# Patient Record
Sex: FEMALE | Race: WHITE | NOT HISPANIC OR LATINO | Employment: OTHER | URBAN - METROPOLITAN AREA
[De-identification: names, ages, dates, MRNs, and addresses within clinical notes are randomized per-mention and may not be internally consistent; named-entity substitution may affect disease eponyms.]

---

## 2017-01-22 ENCOUNTER — HOSPITAL ENCOUNTER (EMERGENCY)
Facility: HOSPITAL | Age: 72
Discharge: HOME | End: 2017-01-22
Attending: EMERGENCY MEDICINE | Admitting: EMERGENCY MEDICINE

## 2017-01-22 ENCOUNTER — APPOINTMENT (OUTPATIENT)
Dept: GENERAL RADIOLOGY | Facility: HOSPITAL | Age: 72
End: 2017-01-22

## 2017-01-22 VITALS
TEMPERATURE: 97.9 F | WEIGHT: 147 LBS | HEART RATE: 68 BPM | OXYGEN SATURATION: 96 % | BODY MASS INDEX: 31.71 KG/M2 | HEIGHT: 57 IN | DIASTOLIC BLOOD PRESSURE: 71 MMHG | RESPIRATION RATE: 14 BRPM | SYSTOLIC BLOOD PRESSURE: 109 MMHG

## 2017-01-22 DIAGNOSIS — S93.601A RIGHT FOOT SPRAIN, INITIAL ENCOUNTER: Primary | ICD-10-CM

## 2017-01-22 PROCEDURE — 99283 EMERGENCY DEPT VISIT LOW MDM: CPT

## 2017-01-22 PROCEDURE — 73630 X-RAY EXAM OF FOOT: CPT

## 2017-01-22 RX ORDER — LOVASTATIN 20 MG/1
20 TABLET ORAL NIGHTLY
COMMUNITY

## 2017-01-22 RX ORDER — LISINOPRIL 20 MG/1
20 TABLET ORAL DAILY
Status: ON HOLD | COMMUNITY
End: 2018-04-23 | Stop reason: CLARIF

## 2017-01-22 RX ORDER — HYDROCODONE BITARTRATE AND ACETAMINOPHEN 5; 325 MG/1; MG/1
1-2 TABLET ORAL EVERY 4 HOURS PRN
Qty: 12 TABLET | Refills: 0 | Status: SHIPPED | OUTPATIENT
Start: 2017-01-22 | End: 2018-01-13 | Stop reason: HOSPADM

## 2017-01-22 RX ORDER — ATENOLOL 25 MG/1
25 TABLET ORAL DAILY
COMMUNITY

## 2017-01-22 RX ORDER — HYDROCODONE BITARTRATE AND ACETAMINOPHEN 5; 325 MG/1; MG/1
1 TABLET ORAL ONCE
Status: COMPLETED | OUTPATIENT
Start: 2017-01-22 | End: 2017-01-22

## 2017-01-22 RX ADMIN — HYDROCODONE BITARTRATE AND ACETAMINOPHEN 1 TABLET: 5; 325 TABLET ORAL at 11:03

## 2017-01-22 NOTE — ED PROVIDER NOTES
Subjective   HPI Comments: Stefanie Russell is a 72 y.o.female who presents to the ED c/o a right foot injury. Pt states she has fallen 4 times recently. The first fall she tripped at home on her slippers and hurt her right foot. The second fall she slipped on ice. The third fall she tripped entering her front door and fell into a bush. The final fall she got tangled up in her husbands walker and fell. She states her whole foot hurts now. She has an appointment with Dr. Rivero, foot orthopedist, tomorrow.    Patient is a 72 y.o. female presenting with lower extremity pain.   History provided by:  Patient  Lower Extremity Issue   Location:  Foot  Injury: yes    Mechanism of injury: fall    Fall:     Fall occurred:  Walking  Foot location:  R foot  Pain details:     Severity:  Moderate    Onset quality:  Sudden    Timing:  Constant    Progression:  Worsening  Chronicity:  New  Dislocation: no    Foreign body present:  No foreign bodies  Associated symptoms: no numbness    Risk factors: no obesity        Review of Systems   All other systems reviewed and are negative.      Past Medical History   Diagnosis Date   • Hyperlipidemia    • Hypertension        No Known Allergies    Past Surgical History   Procedure Laterality Date   • Colon surgery     • Hysterectomy     • Tonsillectomy     • Shoulder surgery       rotator cuff (left)       History reviewed. No pertinent family history.    Social History     Social History   • Marital status:      Spouse name: N/A   • Number of children: N/A   • Years of education: N/A     Social History Main Topics   • Smoking status: Never Smoker   • Smokeless tobacco: None   • Alcohol use No   • Drug use: No   • Sexual activity: Defer     Other Topics Concern   • None     Social History Narrative   • None         Objective   Physical Exam   Constitutional: She is oriented to person, place, and time. She appears well-developed and well-nourished.   HENT:   Head: Normocephalic and  "atraumatic.   Right Ear: External ear normal.   Left Ear: External ear normal.   Nose: Nose normal.   Eyes: Conjunctivae are normal.   Neck: Normal range of motion. Neck supple.   Musculoskeletal: Normal range of motion. She exhibits tenderness (Mild medial malleolus tenderness. No lateral malleolus. Calcaneuous is nml. Proximal tarsal bones are tender with no crepitance or deformity.).   Neurological: She is alert and oriented to person, place, and time.   Skin: Skin is warm and dry.   Psychiatric: She has a normal mood and affect. Her behavior is normal. Judgment and thought content normal.   Nursing note and vitals reviewed.      Procedures         ED Course  ED Course   Comment By Time   AMRIK query complete. Treatment plan to include limited course of prescribed  controlled substance. Risks including addiction, benefits, and alternatives presented to patient.    Eric Motta 01/22 1050         Course of Care      Lab Results (last 24 hours)     ** No results found for the last 24 hours. **          Note: In addition to lab results from this visit, the labs listed above may include labs taken at another facility or during a different encounter within the last 24 hours. Please correlate lab times with ED admission and discharge times for further clarification of the services performed during this visit.    XR Foot 3+ View Right    (Results Pending)       Vitals:    01/22/17 0936 01/22/17 0939   BP:  119/82   BP Location:  Right arm   Patient Position:  Sitting   Pulse: 74    Resp: 16    Temp: 97.9 °F (36.6 °C)    TempSrc: Oral    SpO2: 95%    Weight: 147 lb (66.7 kg)    Height: 57\" (144.8 cm)        Medications   HYDROcodone-acetaminophen (NORCO) 5-325 MG per tablet 1 tablet (not administered)       ECG/EMG Results (last 24 hours)     ** No results found for the last 24 hours. **                      [facility]    Final diagnoses:   Right foot sprain, initial encounter       Documentation assistance " provided by ofelia Motta.  Information recorded by the ofelia was done at my direction and has been verified and validated by me.     Eric Motta  01/22/17 1041       Eric Motta  01/22/17 1052       Eric Motta  01/22/17 1058       Henok Wagner MD  01/23/17 7148

## 2017-01-22 NOTE — DISCHARGE INSTRUCTIONS
Use your walker and wheelchair to take weight off your foot/ankle as best you can. Keep your right foot elevated as much as possible. As discussed in detail, go through your home and eliminate trip hazards.    Follow up with your orthopedist/podiatrist as already scheduled.     Information regarding Risks and Benefits When using Opioids and Other Controlled Substances to include Storage and Disposal of Medications    When considering the use of opioids and other controlled substances for the control of pain, anxiety, or for other medical purposes, you need to know of not only the benefits of these drugs but also of potential risks in using these drugs. These drugs, as well as more drugs, have beneficial uses; which is why their use is being considered in your   care, but they have risks involved in their use, too.    Opioids:  Opioids such as hydrocodone, oxycodone, hydromorphone, and codeine are pain relieving drugs, some more potent than others. They are most useful for moderate to more severe painful conditions. Risks include sedation, loss of coordination, decreased concentration, and decreased breathing with possibility of loss of consciousness or even death, especially if used in doses higher than prescribed. Improper usage can lead to addiction, tolerance, or overdose. In addition, many of these drugs are combined with acetaminophen (Tylenol) which can damage or destroy our liver when used excessively.  Alternatives to opioids are useful for mild to moderate pain and include ibuprofen (Motrin), naproxen (Aleve), aspirin, and acetaminophen (Tylenol). As with other drugs, these medications should be used according to directions on the label or from your doctor, as overuse can cause harm.    Benzodiazepines:  This group of drugs include: alprazolam (Xanax), diazepam (Valium), lorazepam (Ativan), and clonazepam (Klonopin). These drugs are used to control anxiety symptoms including anxiety and panic attacks. Risks  using these drugs include: sedation, loss of coordination, decreased ability to concentrate, effects on memory, and decreased breathing with possibility of loss of consciousness or even death. Improper and prolonged usage can lead to addiction. An alternative without addiction potential is hydroxyzine (Vistrail).    Other Controlled Substance:  This group includes Soma, Tramadol, stimulant drugs such as Ritalin, and others. Stimulant drugs are not medications that are prescribed by ER doctors. Soma and Tramadol have sedative and addictive affects similar to opioids with the same dangers mentioned with them.    Overdose:  If you or someone else are concerned that overdose has occurred, call 911 for transportation to the nearest hospital.    Storage and Disposal:  All medications need to be kept out of the reach of children or adults who cannot manage their own medicines. In addition, controlled substances can be targeted by criminals so extra precautions need to be taken to keep them in a safe, secure place. Any unused medications should be disposed of by flushing them down the toilet in the home setting or contact your local pharmacy.

## 2018-01-08 ENCOUNTER — HOSPITAL ENCOUNTER (INPATIENT)
Facility: HOSPITAL | Age: 73
LOS: 4 days | Discharge: NURSING, REHAB OR LONG-TERM CARE | End: 2018-01-13
Attending: EMERGENCY MEDICINE | Admitting: HOSPITALIST

## 2018-01-08 ENCOUNTER — APPOINTMENT (OUTPATIENT)
Dept: GENERAL RADIOLOGY | Facility: HOSPITAL | Age: 73
End: 2018-01-08

## 2018-01-08 ENCOUNTER — APPOINTMENT (OUTPATIENT)
Dept: CT IMAGING | Facility: HOSPITAL | Age: 73
End: 2018-01-08

## 2018-01-08 DIAGNOSIS — R52 INTRACTABLE PAIN: ICD-10-CM

## 2018-01-08 DIAGNOSIS — Z74.09 IMPAIRED FUNCTIONAL MOBILITY, BALANCE, GAIT, AND ENDURANCE: ICD-10-CM

## 2018-01-08 DIAGNOSIS — S32.810A MULTIPLE CLOSED FRACTURES OF PELVIS WITH STABLE DISRUPTION OF PELVIC RING, INITIAL ENCOUNTER (HCC): Primary | ICD-10-CM
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
## 2018-01-08 PROBLEM — R29.6 FREQUENT FALLS: Status: ACTIVE | Noted: 2018-01-08
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
## 2018-01-08 PROBLEM — E87.6 HYPOKALEMIA: Status: ACTIVE | Noted: 2018-01-08

## 2018-01-08 PROBLEM — M81.0 OSTEOPOROSIS: Status: ACTIVE | Noted: 2018-01-08

## 2018-01-08 PROBLEM — Z87.81 HISTORY OF PELVIC FRACTURE: Status: ACTIVE | Noted: 2018-01-08

## 2018-01-08 PROBLEM — S09.90XA HEAD TRAUMA: Status: ACTIVE | Noted: 2018-01-08

## 2018-01-08 LAB
ANION GAP SERPL CALCULATED.3IONS-SCNC: 9 MMOL/L (ref 3–11)
BASOPHILS # BLD AUTO: 0.05 10*3/MM3 (ref 0–0.2)
BASOPHILS NFR BLD AUTO: 0.5 % (ref 0–1)
BUN BLD-MCNC: 19 MG/DL (ref 9–23)
BUN/CREAT SERPL: 21.1 (ref 7–25)
CALCIUM SPEC-SCNC: 8.6 MG/DL (ref 8.7–10.4)
CHLORIDE SERPL-SCNC: 103 MMOL/L (ref 99–109)
CO2 SERPL-SCNC: 24 MMOL/L (ref 20–31)
CREAT BLD-MCNC: 0.9 MG/DL (ref 0.6–1.3)
DEPRECATED RDW RBC AUTO: 46.5 FL (ref 37–54)
EOSINOPHIL # BLD AUTO: 0.16 10*3/MM3 (ref 0–0.3)
EOSINOPHIL NFR BLD AUTO: 1.5 % (ref 0–3)
ERYTHROCYTE [DISTWIDTH] IN BLOOD BY AUTOMATED COUNT: 13.9 % (ref 11.3–14.5)
GFR SERPL CREATININE-BSD FRML MDRD: 62 ML/MIN/1.73
GLUCOSE BLD-MCNC: 113 MG/DL (ref 70–100)
HCT VFR BLD AUTO: 36.5 % (ref 34.5–44)
HGB BLD-MCNC: 12.5 G/DL (ref 11.5–15.5)
IMM GRANULOCYTES # BLD: 0.07 10*3/MM3 (ref 0–0.03)
IMM GRANULOCYTES NFR BLD: 0.7 % (ref 0–0.6)
LYMPHOCYTES # BLD AUTO: 2.09 10*3/MM3 (ref 0.6–4.8)
LYMPHOCYTES NFR BLD AUTO: 19.9 % (ref 24–44)
MCH RBC QN AUTO: 31.3 PG (ref 27–31)
MCHC RBC AUTO-ENTMCNC: 34.2 G/DL (ref 32–36)
MCV RBC AUTO: 91.5 FL (ref 80–99)
MONOCYTES # BLD AUTO: 0.84 10*3/MM3 (ref 0–1)
MONOCYTES NFR BLD AUTO: 8 % (ref 0–12)
NEUTROPHILS # BLD AUTO: 7.27 10*3/MM3 (ref 1.5–8.3)
NEUTROPHILS NFR BLD AUTO: 69.4 % (ref 41–71)
PLATELET # BLD AUTO: 227 10*3/MM3 (ref 150–450)
PMV BLD AUTO: 9.7 FL (ref 6–12)
POTASSIUM BLD-SCNC: 3.2 MMOL/L (ref 3.5–5.5)
RBC # BLD AUTO: 3.99 10*6/MM3 (ref 3.89–5.14)
SODIUM BLD-SCNC: 136 MMOL/L (ref 132–146)
WBC NRBC COR # BLD: 10.48 10*3/MM3 (ref 3.5–10.8)

## 2018-01-08 PROCEDURE — 70450 CT HEAD/BRAIN W/O DYE: CPT

## 2018-01-08 PROCEDURE — G0378 HOSPITAL OBSERVATION PER HR: HCPCS

## 2018-01-08 PROCEDURE — 80048 BASIC METABOLIC PNL TOTAL CA: CPT | Performed by: EMERGENCY MEDICINE

## 2018-01-08 PROCEDURE — 99284 EMERGENCY DEPT VISIT MOD MDM: CPT

## 2018-01-08 PROCEDURE — 85025 COMPLETE CBC W/AUTO DIFF WBC: CPT | Performed by: EMERGENCY MEDICINE

## 2018-01-08 PROCEDURE — 25010000002 ONDANSETRON PER 1 MG: Performed by: EMERGENCY MEDICINE

## 2018-01-08 PROCEDURE — 73502 X-RAY EXAM HIP UNI 2-3 VIEWS: CPT

## 2018-01-08 PROCEDURE — 93005 ELECTROCARDIOGRAM TRACING: CPT | Performed by: FAMILY MEDICINE

## 2018-01-08 PROCEDURE — 25010000002 FENTANYL CITRATE (PF) 100 MCG/2ML SOLUTION: Performed by: EMERGENCY MEDICINE

## 2018-01-08 PROCEDURE — 99223 1ST HOSP IP/OBS HIGH 75: CPT | Performed by: FAMILY MEDICINE

## 2018-01-08 PROCEDURE — 25010000002 HYDROMORPHONE PER 4 MG: Performed by: EMERGENCY MEDICINE

## 2018-01-08 PROCEDURE — 72192 CT PELVIS W/O DYE: CPT

## 2018-01-08 RX ORDER — POTASSIUM CHLORIDE 7.45 MG/ML
10 INJECTION INTRAVENOUS
Status: DISCONTINUED | OUTPATIENT
Start: 2018-01-08 | End: 2018-01-13 | Stop reason: HOSPADM

## 2018-01-08 RX ORDER — SODIUM CHLORIDE 0.9 % (FLUSH) 0.9 %
10 SYRINGE (ML) INJECTION AS NEEDED
Status: DISCONTINUED | OUTPATIENT
Start: 2018-01-08 | End: 2018-01-13 | Stop reason: HOSPADM

## 2018-01-08 RX ORDER — POTASSIUM CHLORIDE 750 MG/1
40 CAPSULE, EXTENDED RELEASE ORAL AS NEEDED
Status: DISCONTINUED | OUTPATIENT
Start: 2018-01-08 | End: 2018-01-13 | Stop reason: HOSPADM

## 2018-01-08 RX ORDER — SODIUM CHLORIDE 0.9 % (FLUSH) 0.9 %
1-10 SYRINGE (ML) INJECTION AS NEEDED
Status: DISCONTINUED | OUTPATIENT
Start: 2018-01-08 | End: 2018-01-13 | Stop reason: HOSPADM

## 2018-01-08 RX ORDER — POTASSIUM CHLORIDE 1.5 G/1.77G
40 POWDER, FOR SOLUTION ORAL AS NEEDED
Status: DISCONTINUED | OUTPATIENT
Start: 2018-01-08 | End: 2018-01-13 | Stop reason: HOSPADM

## 2018-01-08 RX ORDER — HYDROMORPHONE HYDROCHLORIDE 1 MG/ML
0.25 INJECTION, SOLUTION INTRAMUSCULAR; INTRAVENOUS; SUBCUTANEOUS ONCE
Status: COMPLETED | OUTPATIENT
Start: 2018-01-08 | End: 2018-01-08

## 2018-01-08 RX ORDER — FENTANYL CITRATE 50 UG/ML
25 INJECTION, SOLUTION INTRAMUSCULAR; INTRAVENOUS ONCE
Status: COMPLETED | OUTPATIENT
Start: 2018-01-08 | End: 2018-01-08

## 2018-01-08 RX ORDER — ONDANSETRON 2 MG/ML
4 INJECTION INTRAMUSCULAR; INTRAVENOUS EVERY 6 HOURS PRN
Status: DISCONTINUED | OUTPATIENT
Start: 2018-01-08 | End: 2018-01-13 | Stop reason: HOSPADM

## 2018-01-08 RX ORDER — ACETAMINOPHEN 500 MG
500 TABLET ORAL EVERY 6 HOURS SCHEDULED
Status: DISCONTINUED | OUTPATIENT
Start: 2018-01-08 | End: 2018-01-09

## 2018-01-08 RX ORDER — ONDANSETRON 2 MG/ML
4 INJECTION INTRAMUSCULAR; INTRAVENOUS ONCE
Status: COMPLETED | OUTPATIENT
Start: 2018-01-08 | End: 2018-01-08

## 2018-01-08 RX ORDER — HYDROMORPHONE HYDROCHLORIDE 1 MG/ML
0.25 INJECTION, SOLUTION INTRAMUSCULAR; INTRAVENOUS; SUBCUTANEOUS
Status: DISCONTINUED | OUTPATIENT
Start: 2018-01-08 | End: 2018-01-09

## 2018-01-08 RX ADMIN — ONDANSETRON 4 MG: 2 INJECTION, SOLUTION INTRAMUSCULAR; INTRAVENOUS at 17:04

## 2018-01-08 RX ADMIN — FENTANYL CITRATE 25 MCG: 50 INJECTION INTRAMUSCULAR; INTRAVENOUS at 17:04

## 2018-01-08 RX ADMIN — HYDROMORPHONE HYDROCHLORIDE 0.25 MG: 1 INJECTION, SOLUTION INTRAMUSCULAR; INTRAVENOUS; SUBCUTANEOUS at 17:24

## 2018-01-08 NOTE — ED PROVIDER NOTES
"Subjective   HPI Comments: Stefanie Russell is a 73 y.o.female who presents to the emergency department after a fall at home. The patient states that she has had two falls recently. About a week ago, she had a \"bad fall\" in her bedroom. The patient states that she went to turn and stumbled over her feet, falling onto the carpet. She hit her head but did not lose consciousness. She was able to stand and ambulate after the first fall but has had some soreness across her lower back. The patient fell again this afternoon around 1300. She was helping her  light his cigarette when she tripped over his feet and fell onto her right side. She now has pain in the groin and right leg in addition to the pain in her lower back. The patient has been unable to lift her right leg, walk, or bear weight on the right leg secondary to pain. She denies any neck or upper back pain. The pain in her lower back is not worse after the second fall and has actually been improving. She did not hit head or lose consciousness during most recent fall. There are no other acute complaints at this time.  Pain is mild when lying still and severe when moving hip at all.  No sensory changes distal to the hip.              Patient is a 73 y.o. female presenting with fall.   History provided by:  Patient  Fall   Mechanism of injury: fall    Injury location:  Pelvis and leg  Pelvic injury location:  Groin  Leg injury location:  R leg  Incident location:  Home  Fall:     Impact surface:  Carpet  Suspicion of alcohol use: no    Suspicion of drug use: no    Associated symptoms: back pain (lower back)    Associated symptoms: no abdominal pain, no chest pain, no headaches, no nausea, no neck pain and no vomiting        Review of Systems   Constitutional: Negative for chills and fever.   HENT: Negative for congestion, rhinorrhea, sore throat and trouble swallowing.    Respiratory: Negative for cough and shortness of breath.    Cardiovascular: Negative for " chest pain.   Gastrointestinal: Negative for abdominal pain, diarrhea, nausea and vomiting.   Genitourinary: Negative for difficulty urinating, dysuria, frequency, hematuria and urgency.   Musculoskeletal: Positive for arthralgias (right leg and groin), back pain (lower back) and gait problem (secondary to pain). Negative for neck pain.   Neurological: Negative for dizziness, weakness and headaches.   All other systems reviewed and are negative.      Past Medical History:   Diagnosis Date   • Hyperlipidemia    • Hypertension        No Known Allergies    Past Surgical History:   Procedure Laterality Date   • COLON SURGERY     • HYSTERECTOMY     • SHOULDER SURGERY      rotator cuff (left)   • TONSILLECTOMY         History reviewed. No pertinent family history.    Social History     Social History   • Marital status:      Spouse name: N/A   • Number of children: N/A   • Years of education: N/A     Social History Main Topics   • Smoking status: Never Smoker   • Smokeless tobacco: None   • Alcohol use No   • Drug use: No   • Sexual activity: Defer     Other Topics Concern   • None     Social History Narrative         Objective   Physical Exam   Constitutional: She is oriented to person, place, and time. She appears well-developed and well-nourished. No distress.   Appears in moderate discomfort.   HENT:   Head: Normocephalic and atraumatic.   Nose: Nose normal.   Mouth/Throat: Oropharynx is clear and moist.   Eyes: Conjunctivae are normal. No scleral icterus.   Neck: Normal range of motion. Neck supple. No JVD present. No thyromegaly present.   Cardiovascular: Normal rate, regular rhythm, normal heart sounds and intact distal pulses.  Exam reveals no gallop and no friction rub.    No murmur heard.  Pulses:       Dorsalis pedis pulses are 1+ on the right side, and 1+ on the left side.   Pulmonary/Chest: Effort normal and breath sounds normal. No respiratory distress. She has no wheezes. She has no rales.    Abdominal: Soft. Bowel sounds are normal. She exhibits no mass. There is no tenderness. There is no rebound and no guarding.   Musculoskeletal: She exhibits tenderness. She exhibits no edema.   Holds her legs in a flexed position. Shortening and rotation not possible to ascertain. Mild tenderness to palpation of the pubis symphysis. Pelvis is stable. Does not tolerate right hip rotation due to pain and tenderness.    Lymphadenopathy:     She has no cervical adenopathy.   Neurological: She is alert and oriented to person, place, and time.   Motor and sensory intact.   Skin: Skin is warm and dry. No erythema.   Psychiatric: She has a normal mood and affect. Her behavior is normal.   Nursing note and vitals reviewed.      Procedures         ED Course  ED Course   Comment By Time   Dr. Wagner paged Dr. Porras and Dr. Spangler. Corrina Bonner 01/08 2028   Dr. Wagner spoke with Dr. Porras. Case discussed in detail. He will follow. Henok Wagner MD 01/08 2030     Recent Results (from the past 24 hour(s))   Basic Metabolic Panel    Collection Time: 01/08/18  7:39 PM   Result Value Ref Range    Glucose 113 (H) 70 - 100 mg/dL    BUN 19 9 - 23 mg/dL    Creatinine 0.90 0.60 - 1.30 mg/dL    Sodium 136 132 - 146 mmol/L    Potassium 3.2 (L) 3.5 - 5.5 mmol/L    Chloride 103 99 - 109 mmol/L    CO2 24.0 20.0 - 31.0 mmol/L    Calcium 8.6 (L) 8.7 - 10.4 mg/dL    eGFR Non African Amer 62 >60 mL/min/1.73    BUN/Creatinine Ratio 21.1 7.0 - 25.0    Anion Gap 9.0 3.0 - 11.0 mmol/L   CBC Auto Differential    Collection Time: 01/08/18  7:39 PM   Result Value Ref Range    WBC 10.48 3.50 - 10.80 10*3/mm3    RBC 3.99 3.89 - 5.14 10*6/mm3    Hemoglobin 12.5 11.5 - 15.5 g/dL    Hematocrit 36.5 34.5 - 44.0 %    MCV 91.5 80.0 - 99.0 fL    MCH 31.3 (H) 27.0 - 31.0 pg    MCHC 34.2 32.0 - 36.0 g/dL    RDW 13.9 11.3 - 14.5 %    RDW-SD 46.5 37.0 - 54.0 fl    MPV 9.7 6.0 - 12.0 fL    Platelets 227 150 - 450 10*3/mm3    Neutrophil % 69.4 41.0 -  71.0 %    Lymphocyte % 19.9 (L) 24.0 - 44.0 %    Monocyte % 8.0 0.0 - 12.0 %    Eosinophil % 1.5 0.0 - 3.0 %    Basophil % 0.5 0.0 - 1.0 %    Immature Grans % 0.7 (H) 0.0 - 0.6 %    Neutrophils, Absolute 7.27 1.50 - 8.30 10*3/mm3    Lymphocytes, Absolute 2.09 0.60 - 4.80 10*3/mm3    Monocytes, Absolute 0.84 0.00 - 1.00 10*3/mm3    Eosinophils, Absolute 0.16 0.00 - 0.30 10*3/mm3    Basophils, Absolute 0.05 0.00 - 0.20 10*3/mm3    Immature Grans, Absolute 0.07 (H) 0.00 - 0.03 10*3/mm3     Note: In addition to lab results from this visit, the labs listed above may include labs taken at another facility or during a different encounter within the last 24 hours. Please correlate lab times with ED admission and discharge times for further clarification of the services performed during this visit.    CT Pelvis Without Contrast   Final Result     Acute fractures at the junction between the right acetabulum and superior    pubic ramus, and involving the right inferior pubic ramus.  Healed bilateral    sacral and left parasymphyseal pubic fractures.  No acute proximal femoral    fracture or hip dislocation.      THIS DOCUMENT HAS BEEN ELECTRONICALLY SIGNED BY MEERA WOOTEN MD      XR Hip With or Without Pelvis 2 - 3 View Right    (Results Pending)     Vitals:    01/08/18 1939 01/08/18 1945 01/08/18 2000 01/08/18 2030   BP: 130/66 150/69 140/60 129/67   BP Location:       Patient Position:       Pulse: 74      Resp:       Temp:       TempSrc:       SpO2: 97% 97% 94% 96%   Weight:       Height:         Medications   sodium chloride 0.9 % flush 10 mL (not administered)   fentaNYL citrate (PF) (SUBLIMAZE) injection 25 mcg (25 mcg Intravenous Given 1/8/18 1704)   ondansetron (ZOFRAN) injection 4 mg (4 mg Intravenous Given 1/8/18 1704)   HYDROmorphone (DILAUDID) injection 0.25 mg (0.25 mg Intravenous Given 1/8/18 1724)     ECG/EMG Results (last 24 hours)     ** No results found for the last 24 hours. **                         M[facility]    Final diagnoses:   Multiple closed fractures of pelvis with stable disruption of pelvic ring, initial encounter   Intractable pain       Documentation assistance provided by ofelia Bonner.  Information recorded by the ofelia was done at my direction and has been verified and validated by me.     Corrina Bonner  01/08/18 1636       Corrina Bonner  01/08/18 2046       Henok Wagner MD  01/10/18 2014

## 2018-01-09 ENCOUNTER — APPOINTMENT (OUTPATIENT)
Dept: GENERAL RADIOLOGY | Facility: HOSPITAL | Age: 73
End: 2018-01-09

## 2018-01-09 LAB — POTASSIUM BLD-SCNC: 4 MMOL/L (ref 3.5–5.5)

## 2018-01-09 PROCEDURE — 25010000002 HYDROMORPHONE PER 4 MG: Performed by: NURSE PRACTITIONER

## 2018-01-09 PROCEDURE — 99233 SBSQ HOSP IP/OBS HIGH 50: CPT | Performed by: INTERNAL MEDICINE

## 2018-01-09 PROCEDURE — 25010000002 ONDANSETRON PER 1 MG: Performed by: NURSE PRACTITIONER

## 2018-01-09 PROCEDURE — 72100 X-RAY EXAM L-S SPINE 2/3 VWS: CPT

## 2018-01-09 PROCEDURE — 84132 ASSAY OF SERUM POTASSIUM: CPT | Performed by: FAMILY MEDICINE

## 2018-01-09 PROCEDURE — 93010 ELECTROCARDIOGRAM REPORT: CPT | Performed by: INTERNAL MEDICINE

## 2018-01-09 PROCEDURE — 25010000002 ENOXAPARIN PER 10 MG: Performed by: NURSE PRACTITIONER

## 2018-01-09 PROCEDURE — 97161 PT EVAL LOW COMPLEX 20 MIN: CPT

## 2018-01-09 RX ORDER — IBUPROFEN 400 MG/1
400 TABLET ORAL EVERY 4 HOURS PRN
Status: DISCONTINUED | OUTPATIENT
Start: 2018-01-09 | End: 2018-01-13 | Stop reason: HOSPADM

## 2018-01-09 RX ORDER — OXYCODONE HYDROCHLORIDE AND ACETAMINOPHEN 5; 325 MG/1; MG/1
1 TABLET ORAL EVERY 4 HOURS PRN
Status: DISCONTINUED | OUTPATIENT
Start: 2018-01-09 | End: 2018-01-10

## 2018-01-09 RX ORDER — ATENOLOL 25 MG/1
25 TABLET ORAL DAILY
Status: DISCONTINUED | OUTPATIENT
Start: 2018-01-09 | End: 2018-01-13 | Stop reason: HOSPADM

## 2018-01-09 RX ORDER — ACETAMINOPHEN 500 MG
500 TABLET ORAL EVERY 6 HOURS PRN
Status: DISCONTINUED | OUTPATIENT
Start: 2018-01-09 | End: 2018-01-13 | Stop reason: HOSPADM

## 2018-01-09 RX ORDER — LISINOPRIL 20 MG/1
20 TABLET ORAL DAILY
Status: DISCONTINUED | OUTPATIENT
Start: 2018-01-09 | End: 2018-01-13 | Stop reason: HOSPADM

## 2018-01-09 RX ORDER — VENLAFAXINE 75 MG/1
150 TABLET ORAL DAILY
Status: ON HOLD | COMMUNITY
End: 2018-04-23

## 2018-01-09 RX ORDER — HYDROMORPHONE HYDROCHLORIDE 1 MG/ML
0.5 INJECTION, SOLUTION INTRAMUSCULAR; INTRAVENOUS; SUBCUTANEOUS
Status: DISCONTINUED | OUTPATIENT
Start: 2018-01-09 | End: 2018-01-10

## 2018-01-09 RX ADMIN — HYDROMORPHONE HYDROCHLORIDE 0.25 MG: 2 INJECTION INTRAMUSCULAR; INTRAVENOUS; SUBCUTANEOUS at 01:25

## 2018-01-09 RX ADMIN — ENOXAPARIN SODIUM 40 MG: 40 INJECTION SUBCUTANEOUS at 08:37

## 2018-01-09 RX ADMIN — POTASSIUM CHLORIDE 40 MEQ: 750 CAPSULE, EXTENDED RELEASE ORAL at 02:53

## 2018-01-09 RX ADMIN — HYDROMORPHONE HYDROCHLORIDE 0.25 MG: 2 INJECTION INTRAMUSCULAR; INTRAVENOUS; SUBCUTANEOUS at 00:30

## 2018-01-09 RX ADMIN — ACETAMINOPHEN 500 MG: 500 TABLET ORAL at 05:18

## 2018-01-09 RX ADMIN — OXYCODONE AND ACETAMINOPHEN 1 TABLET: 5; 325 TABLET ORAL at 10:22

## 2018-01-09 RX ADMIN — ATENOLOL 25 MG: 25 TABLET ORAL at 08:36

## 2018-01-09 RX ADMIN — HYDROMORPHONE HYDROCHLORIDE 0.25 MG: 2 INJECTION INTRAMUSCULAR; INTRAVENOUS; SUBCUTANEOUS at 09:26

## 2018-01-09 RX ADMIN — POTASSIUM CHLORIDE 40 MEQ: 750 CAPSULE, EXTENDED RELEASE ORAL at 06:39

## 2018-01-09 RX ADMIN — LISINOPRIL 20 MG: 20 TABLET ORAL at 08:36

## 2018-01-09 RX ADMIN — OXYCODONE AND ACETAMINOPHEN 1 TABLET: 5; 325 TABLET ORAL at 21:02

## 2018-01-09 RX ADMIN — OXYCODONE AND ACETAMINOPHEN 1 TABLET: 5; 325 TABLET ORAL at 16:37

## 2018-01-09 RX ADMIN — ACETAMINOPHEN 500 MG: 500 TABLET ORAL at 00:30

## 2018-01-09 RX ADMIN — ONDANSETRON 4 MG: 2 INJECTION INTRAMUSCULAR; INTRAVENOUS at 21:40

## 2018-01-09 RX ADMIN — HYDROMORPHONE HYDROCHLORIDE 0.25 MG: 2 INJECTION INTRAMUSCULAR; INTRAVENOUS; SUBCUTANEOUS at 08:40

## 2018-01-09 NOTE — PROGRESS NOTES
[facility] Medicine Services  PROGRESS NOTE    Patient Name: Stefanie Russell  : 1945  MRN: [id number]    Date of Admission: 2018  Length of Stay: 0  Primary Care Physician: Eric Brunson DO    Subjective   Subjective     CC: pelvic pain    HPI: Patient up in bed with daughter at bedside. Pain fairly well controlled at rest. Unable to tolerate any movement.    Review of Systems  Gen- No fevers, chills  CV- No chest pain, palpitations  Resp- No cough, dyspnea  GI- No N/V/D, abd pain    Otherwise ROS is negative except as mentioned in the HPI.    Objective   Objective     Vital Signs:   Temp:  [97.8 °F (36.6 °C)-98.2 °F (36.8 °C)] 97.8 °F (36.6 °C)  Heart Rate:  [69-89] 74  Resp:  [16-20] 16  BP: (102-150)/(52-79) 103/58        Physical Exam:  Constitutional: No acute distress, awake, alert  HENT: NCAT, mucous membranes moist  Respiratory: Clear to auscultation bilaterally, respiratory effort normal   Cardiovascular: RRR, no murmurs, rubs, or gallops, palpable pedal pulses bilaterally  Gastrointestinal: Positive bowel sounds, soft, nontender, nondistended  Musculoskeletal: No bilateral ankle edema  Psychiatric: Appropriate affect, cooperative  Neurologic: Oriented x 3, strength symmetric in all extremities, Cranial Nerves grossly intact to confrontation, speech clear  Skin: No rashes    Results Reviewed:  I have personally reviewed current lab, radiology, and data and agree.      Results from last 7 days  Lab Units 18   WBC 10*3/mm3 10.48   HEMOGLOBIN g/dL 12.5   HEMATOCRIT % 36.5   PLATELETS 10*3/mm3 227       Results from last 7 days  Lab Units 18  0824 18  193   SODIUM mmol/L  --  136   POTASSIUM mmol/L 4.0 3.2*   CHLORIDE mmol/L  --  103   CO2 mmol/L  --  24.0   BUN mg/dL  --  19   CREATININE mg/dL  --  0.90   GLUCOSE mg/dL  --  113*   CALCIUM mg/dL  --  8.6*     No results found for: BNP  No results found for: PHART    Microbiology Results  Abnormal     None        CT pelvis reviewed with right sided fracture noted. Agree with interpretation.    Imaging Results (last 24 hours)     Procedure Component Value Units Date/Time    CT Pelvis Without Contrast [[id number]] Collected:  01/08/18 1832     Updated:  01/08/18 1933    Narrative:       EXAM:    CT Pelvis Without Intravenous Contrast    EXAM DATE/TIME:    1/8/2018 6:32  PM    CLINICAL HISTORY:    73 years old, female; Trauma.    TECHNIQUE:    Axial computed tomography images of the pelvis without intravenous contrast.    All CT scans at this facility use one or more dose reduction techniques, viz.:   automated exposure control; ma/kV adjustment per patient size (including   targeted exams where dose is matched to indication; i.e. head); or iterative   reconstruction technique.    Coronal and sagittal reformatted images were created and reviewed.    COMPARISON:    No relevant prior studies available.    FINDINGS:    Acute, nondisplaced fracture at the junction between the right acetabulum and   superior pubic ramus.  Acute, nondisplaced fracture of the right inferior pubic   ramus.      Healed bilateral sacral and left parasymphyseal pubic fractures.  No   visualized acute proximal femoral fracture or hip dislocation.  Moderate   chronic arthrosis at the bilateral sacroiliac joints.  Severe chronic   degenerative changes in the lower lumbar spine.  No aggressive osseous lesion.      No large pelvic sidewall or other soft tissue hematoma.  Mild distention of   the urinary bladder.  Colonic constipation.      Impression:         Acute fractures at the junction between the right acetabulum and superior   pubic ramus, and involving the right inferior pubic ramus.  Healed bilateral   sacral and left parasymphyseal pubic fractures.  No acute proximal femoral   fracture or hip dislocation.    THIS DOCUMENT HAS BEEN ELECTRONICALLY SIGNED BY MEERA WOOTEN MD    CT Head Without Contrast [[id number]] Collected:   "01/08/18 2344     Updated:  01/09/18 0012    Narrative:       EXAM:    CT Head Without Intravenous Contrast    EXAM DATE/TIME:    1/8/2018 11:44  PM    CLINICAL HISTORY:    73 years old, female; Pain, Headache not specified; Fall a week ago, hit back   of head.    TECHNIQUE:    Axial computed tomography images of the head/brain without intravenous   contrast.  All CT scans at this facility use one or more dose reduction   techniques, viz.: automated exposure control; ma/kV adjustment per patient size   (including targeted exams where dose is matched to indication; i.e. head); or   iterative reconstruction technique.    COMPARISON:    No relevant prior studies available.    FINDINGS:    There is no acute skull fracture, parenchymal hemorrhage, extraaxial   collection, or acute transcortical infarction.  Patchy foci of low attenuation   within the periventricular white matter are most compatible with chronic   microvascular disease.  There are is mild diffuse volume loss without mass   effect or midline shift.  Vascular calcifications are noted.  The paranasal   sinuses and mastoid air cells are clear.      Impression:         No acute intracranial abnormality.  Patchy microvascular changes and mild   volume loss.    THIS DOCUMENT HAS BEEN ELECTRONICALLY SIGNED BY MEERA WOOTEN MD    XR Spine Lumbar 2 or 3 View [[id number]] Collected:  01/08/18 2359     Updated:  01/09/18 0036    Narrative:       EXAM:    XR Lumbar Spine, 2 or 3 Views    EXAM DATE/TIME:    1/8/2018 11:59 PM    CLINICAL HISTORY:    73 years old, female; Pain.  Initial Injury or trauma; Fall; Sprain or   strain, lumbar ligaments; Injury date: 01/08/2018.  \"fell several days ago   injuring her lower back.    TECHNIQUE:    Frontal and lateral views of the lumbar spine.    COMPARISON:    CR - XR HIP W OR WO PELVIS 2-3 VIEW RIGHT 2018-01-08 18:26    FINDINGS:    No visualized acute lumbar fracture.  Grade 1 anterolisthesis of L5 on S1   mild dextroconvex " curvature of the lower lumbar spine.      Lumbar degenerative disc disease is moderate at L4-L5 and severe L5-S1.    There is moderate to severe multilevel lumbar facet arthrosis.      Chronic-appearing mid sacral fracture.  Nondisplaced acute right pubic   fractures.      Impression:         No visualized acute lumbar injury.  Chronic lumbar degenerative changes,   particularly distally.      Chronic-appearing mid sacral fracture.  Nondisplaced acute right pubic   fractures.    THIS DOCUMENT HAS BEEN ELECTRONICALLY SIGNED BY MEERA WOOTEN MD    XR Hip With or Without Pelvis 2 - 3 View Right [[id number]] Collected:  01/09/18 0906     Updated:  01/09/18 0906    Narrative:       EXAMINATION: XR HIP W OR WO PELVIS 2-3 VIEW RIGHT-      INDICATION: Fall.      COMPARISON: None.     FINDINGS: Imaging of the pelvis and two views of the right hip reveal  degenerative changes seen within the lower lumbar spine and sacroiliac  joints bilaterally. There is deformity identified of the left pubic  symphysis possibly from prior healed injury. Clinical correlation is  needed. The right hip is unremarkable. No fracture or dislocation is  seen within the right hip. Sacroiliac joints reveal degenerative changes  seen bilaterally.           Impression:       Deformity at the left pubic symphysis possibly from prior  healed injury. No acute bony abnormality is identified. The right hip is  intact and unremarkable.  Degenerative change is seen within the  sacroiliac joints bilaterally.     D:  01/08/2018  E:  01/09/2018                   I have reviewed the medications.    Assessment/Plan   Assessment / Plan     Hospital Problem List     * (Principal)Multiple closed fractures of pelvis with stable disruption of pelvic Marshall    History of pelvic fracture    Fall at home, initial encounter    Osteoporosis    Frequent falls    Hypokalemia    Head trauma             Brief Hospital Course to date:  Stefanie Russell is a 73 y.o. female who  presented from home after multiple falls found to have right pelvic fracture.    Assessment & Plan:  --Awaiting ortho confirmation but fracture likely to be managed non-operatively. Continue pain control and await PT eval.  --Has had history of multiple recent falls, all mechanical in nature per patient and daughter. Awaiting PT/OT, but likely needs rehab.  --BP control  --Replace K+.  --Labs in am.    DVT Prophylaxis:  Lovenox    CODE STATUS: Full Code    Disposition: I expect the patient to be discharged to rehab in 1-2 days.    Carolina Lindquist II, DO  01/09/18  12:21 PM

## 2018-01-09 NOTE — PLAN OF CARE
Problem: Patient Care Overview (Adult)  Goal: Plan of Care Review  Outcome: Ongoing (interventions implemented as appropriate)   01/09/18 1704   Coping/Psychosocial Response Interventions   Plan Of Care Reviewed With patient   Patient Care Overview   Progress improving   Outcome Evaluation   Outcome Summary/Follow up Plan Began out of bed mobility today. Patient able to tolerate Wt bearing and take a few slow steps to commode. Will continue working with her. I expect her to go home with home health. He has a supportive family       Problem: Inpatient Physical Therapy  Goal: Bed Mobility Goal LTG- PT  Outcome: Ongoing (interventions implemented as appropriate)   01/09/18 1704   Bed Mobility PT LTG   Bed Mobility PT LTG, Date Established 01/09/18   Bed Mobility PT LTG, Time to Achieve 5 - 7 days   Bed Mobility PT LTG, Activity Type supine to sit/sit to supine;sidelying to sit/sit to sidelying   Bed Mobility PT LTG, [place] Level conditional independence   Bed Mobility PT Goal LTG, Assist Device bed rails   Bed Mobility PT LTG, Outcome goal ongoing     Goal: Transfer Training Goal 1 LTG- PT  Outcome: Ongoing (interventions implemented as appropriate)   01/09/18 1704   Transfer Training PT LTG   Transfer Training PT LTG, Date Established 01/09/18   Transfer Training PT LTG, Time to Achieve 5 - 7 days   Transfer Training PT LTG, Activity Type sit to stand/stand to sit   Transfer Training PT LTG, [place] Level contact guard assist   Transfer Training PT LTG, Assist Device walker, rolling   Transfer Training PT LTG, Outcome goal ongoing     Goal: Gait Training Goal LTG- PT  Outcome: Ongoing (interventions implemented as appropriate)   01/09/18 1704   Gait Training PT LTG   Gait Training Goal PT LTG, Date Established 01/09/18   Gait Training Goal PT LTG, Time to Achieve 5 - 7 days   Gait Training Goal PT LTG, [place] Level contact guard assist   Gait Training Goal PT LTG, Assist Device walker, rolling   Gait  Training Goal PT LTG, Distance to Achieve 75   Gait Training Goal PT LTG, Outcome goal ongoing

## 2018-01-09 NOTE — PLAN OF CARE
Problem: Patient Care Overview (Adult)  Goal: Plan of Care Review  Outcome: Ongoing (interventions implemented as appropriate)   01/09/18 1517   Coping/Psychosocial Response Interventions   Plan Of Care Reviewed With patient;daughter   Patient Care Overview   Progress progress towards functional goals is fair       Problem: Fall Risk (Adult)  Goal: Identify Related Risk Factors and Signs and Symptoms   01/09/18 1517   Fall Risk   Fall Risk: Related Risk Factors age-related changes;bladder function altered;fatigue/slow reaction;fear of falling;gait/mobility problems;history of falls   Fall Risk: Signs and Symptoms presence of risk factors       Problem: Pain, Acute (Adult)  Goal: Identify Related Risk Factors and Signs and Symptoms  Outcome: Ongoing (interventions implemented as appropriate)   01/09/18 1517   Pain, Acute   Related Risk Factors (Acute Pain) patient perception;fear;persistent pain   Signs and Symptoms (Acute Pain) BADLs/IADLs reluctance/inability to perform;fear of reinjury

## 2018-01-09 NOTE — PLAN OF CARE
Problem: Patient Care Overview (Adult)  Goal: Plan of Care Review  Outcome: Ongoing (interventions implemented as appropriate)   01/09/18 1502   Coping/Psychosocial Response Interventions   Plan Of Care Reviewed With patient   Patient Care Overview   Progress progress towards functional goals is fair       Problem: Fall Risk (Adult)  Goal: Identify Related Risk Factors and Signs and Symptoms  Outcome: Ongoing (interventions implemented as appropriate)   01/09/18 1502   Fall Risk   Fall Risk: Related Risk Factors age-related changes;bladder function altered;fear of falling;gait/mobility problems;history of falls   Fall Risk: Signs and Symptoms presence of risk factors       Problem: Pain, Acute (Adult)  Goal: Identify Related Risk Factors and Signs and Symptoms   01/09/18 1502   Pain, Acute   Related Risk Factors (Acute Pain) persistent pain   Signs and Symptoms (Acute Pain) sleep pattern alteration     Goal: Acceptable Pain Control/Comfort Level  Outcome: Ongoing (interventions implemented as appropriate)   01/09/18 1502   Pain, Acute (Adult)   Acceptable Pain Control/Comfort Level making progress toward outcome

## 2018-01-09 NOTE — PLAN OF CARE
Problem: Patient Care Overview (Adult)  Goal: Plan of Care Review  Outcome: Ongoing (interventions implemented as appropriate)   01/09/18 0212   Coping/Psychosocial Response Interventions   Plan Of Care Reviewed With patient   Patient Care Overview   Progress progress toward functional goals as expected   Outcome Evaluation   Outcome Summary/Follow up Plan Pt will be able to do ADLs for self.       Problem: Fall Risk (Adult)  Goal: Identify Related Risk Factors and Signs and Symptoms  Outcome: Ongoing (interventions implemented as appropriate)   01/09/18 0212   Fall Risk   Fall Risk: Related Risk Factors age-related changes   Fall Risk: Signs and Symptoms presence of risk factors       Problem: Pain, Acute (Adult)  Goal: Identify Related Risk Factors and Signs and Symptoms  Outcome: Ongoing (interventions implemented as appropriate)   01/09/18 0212   Pain, Acute   Related Risk Factors (Acute Pain) persistent pain   Signs and Symptoms (Acute Pain) other (see comments)  (pelvic fracture)

## 2018-01-09 NOTE — CONSULTS
"Stefanie Russell    Referring Provider: No ref. provider found  Reason for Consultation: right hip pain    Patient Care Team:  Eric Brunson DO as PCP - General (Family Medicine)    Chief complaint: Pain in right hip      Subjective .     History of present illness: 73-year-old lady who lives at home with her  who has Parkinson's.  She fell is of pain in her right hip.  She has had 2 falls prior to that.  She has had a history of a fall on the left in the past during which she had superior and inferior pubic rami fractures on the left.    Review of Systems She has had no recent fever or weight loss hearing vision or speech problems sore throat runny nose chest pain shortness of breath nausea dysuria myalgias rash headaches mental status changes fatigue bleeding or bruising problems  Review of Systems      History  Past Medical History:   Diagnosis Date   • High cholesterol    • History of pelvic fracture 1/8/2018   • Hyperlipidemia    • Hypertension    • Osteoporosis      Past Surgical History:   Procedure Laterality Date   • COLON SURGERY     • HYSTERECTOMY     • SHOULDER SURGERY      rotator cuff (left)   • TONSILLECTOMY       Family History   Problem Relation Age of Onset   • Lymphoma Mother    • Melanoma Mother    • Stroke Father    • Heart disease Father    • Heart failure Father          Objective     Vital Signs   /62  Pulse 68  Temp 97.7 °F (36.5 °C)  Resp 16  Ht 144.8 cm (57\")  Wt 62.6 kg (138 lb)  SpO2 95%  BMI 29.86 kg/m2      Physical Exam:  General Appearance:    Alert, cooperative, in no acute distress   Head:    Normocephalic, without obvious abnormality, atraumatic           Throat:   No oral lesions, no thrush, oral mucosa moist   Neck:   No adenopathy, supple, trachea midline, no thyromegaly,        Lungs:     Clear to auscultation,respirations regular, even and                   unlabored    Heart:    Regular rhythm and normal rate, normal S1 and S2,       Abdomen:     " Normal bowel sounds, no masses, no organomegaly, soft        non-tender, non-distended,       Extremities:   Pain to active and passive range of motion of her right hip with flexion extension so with internal and external rotation had painless range of motion of her left hip .  Her distal motor sensory status to her lower extremities is intact    Pulses:   Pulses palpable and equal bilaterally   Skin:   No bleeding, bruising or rash   Lymph nodes:   No palpable adenopathy                Lab results:  Lab Results (last 24 hours)     Procedure Component Value Units Date/Time    CBC & Differential [[id number]] Collected:  01/08/18 1939    Specimen:  Blood Updated:  01/08/18 1951    Narrative:       The following orders were created for panel order CBC & Differential.  Procedure                               Abnormality         Status                     ---------                               -----------         ------                     CBC Auto Differential[[id number]]         Abnormal            Final result                 Please view results for these tests on the individual orders.    CBC Auto Differential [[id number]]  (Abnormal) Collected:  01/08/18 1939    Specimen:  Blood Updated:  01/08/18 1951     WBC 10.48 10*3/mm3      RBC 3.99 10*6/mm3      Hemoglobin 12.5 g/dL      Hematocrit 36.5 %      MCV 91.5 fL      MCH 31.3 (H) pg      MCHC 34.2 g/dL      RDW 13.9 %      RDW-SD 46.5 fl      MPV 9.7 fL      Platelets 227 10*3/mm3      Neutrophil % 69.4 %      Lymphocyte % 19.9 (L) %      Monocyte % 8.0 %      Eosinophil % 1.5 %      Basophil % 0.5 %      Immature Grans % 0.7 (H) %      Neutrophils, Absolute 7.27 10*3/mm3      Lymphocytes, Absolute 2.09 10*3/mm3      Monocytes, Absolute 0.84 10*3/mm3      Eosinophils, Absolute 0.16 10*3/mm3      Basophils, Absolute 0.05 10*3/mm3      Immature Grans, Absolute 0.07 (H) 10*3/mm3     Basic Metabolic Panel [[id number]]  (Abnormal) Collected:  01/08/18 1939    Specimen:  Blood  Updated:  01/08/18 2006     Glucose 113 (H) mg/dL      BUN 19 mg/dL      Creatinine 0.90 mg/dL      Sodium 136 mmol/L      Potassium 3.2 (L) mmol/L      Chloride 103 mmol/L      CO2 24.0 mmol/L      Calcium 8.6 (L) mg/dL      eGFR Non African Amer 62 mL/min/1.73      BUN/Creatinine Ratio 21.1     Anion Gap 9.0 mmol/L     Narrative:       [employer] Guidelines    Stage     Description        GFR  1         Normal or High     90+  2         Mild decrease      60-89  3         Moderate decrease  30-59  4         Severe decrease    15-29  5         Kidney failure     <15    Potassium [[id number]]  (Normal) Collected:  01/09/18 0824    Specimen:  Blood Updated:  01/09/18 1000     Potassium 4.0 mmol/L           Radiology:  Imaging Results (last 72 hours)     Procedure Component Value Units Date/Time    CT Pelvis Without Contrast [[id number]] Collected:  01/08/18 1832     Updated:  01/08/18 1933    Narrative:       EXAM:    CT Pelvis Without Intravenous Contrast    EXAM DATE/TIME:    1/8/2018 6:32  PM    CLINICAL HISTORY:    73 years old, female; Trauma.    TECHNIQUE:    Axial computed tomography images of the pelvis without intravenous contrast.    All CT scans at this facility use one or more dose reduction techniques, viz.:   automated exposure control; ma/kV adjustment per patient size (including   targeted exams where dose is matched to indication; i.e. head); or iterative   reconstruction technique.    Coronal and sagittal reformatted images were created and reviewed.    COMPARISON:    No relevant prior studies available.    FINDINGS:    Acute, nondisplaced fracture at the junction between the right acetabulum and   superior pubic ramus.  Acute, nondisplaced fracture of the right inferior pubic   ramus.      Healed bilateral sacral and left parasymphyseal pubic fractures.  No   visualized acute proximal femoral fracture or hip dislocation.  Moderate   chronic arthrosis at the bilateral sacroiliac joints.   Severe chronic   degenerative changes in the lower lumbar spine.  No aggressive osseous lesion.      No large pelvic sidewall or other soft tissue hematoma.  Mild distention of   the urinary bladder.  Colonic constipation.      Impression:         Acute fractures at the junction between the right acetabulum and superior   pubic ramus, and involving the right inferior pubic ramus.  Healed bilateral   sacral and left parasymphyseal pubic fractures.  No acute proximal femoral   fracture or hip dislocation.    THIS DOCUMENT HAS BEEN ELECTRONICALLY SIGNED BY MEERA WOOTEN MD    CT Head Without Contrast [[id number]] Collected:  01/08/18 2344     Updated:  01/09/18 0012    Narrative:       EXAM:    CT Head Without Intravenous Contrast    EXAM DATE/TIME:    1/8/2018 11:44  PM    CLINICAL HISTORY:    73 years old, female; Pain, Headache not specified; Fall a week ago, hit back   of head.    TECHNIQUE:    Axial computed tomography images of the head/brain without intravenous   contrast.  All CT scans at this facility use one or more dose reduction   techniques, viz.: automated exposure control; ma/kV adjustment per patient size   (including targeted exams where dose is matched to indication; i.e. head); or   iterative reconstruction technique.    COMPARISON:    No relevant prior studies available.    FINDINGS:    There is no acute skull fracture, parenchymal hemorrhage, extraaxial   collection, or acute transcortical infarction.  Patchy foci of low attenuation   within the periventricular white matter are most compatible with chronic   microvascular disease.  There are is mild diffuse volume loss without mass   effect or midline shift.  Vascular calcifications are noted.  The paranasal   sinuses and mastoid air cells are clear.      Impression:         No acute intracranial abnormality.  Patchy microvascular changes and mild   volume loss.    THIS DOCUMENT HAS BEEN ELECTRONICALLY SIGNED BY MEERA WOOTEN MD    XR Spine Lumbar 2 or  "3 View [[id number]] Collected:  01/08/18 2359     Updated:  01/09/18 0036    Narrative:       EXAM:    XR Lumbar Spine, 2 or 3 Views    EXAM DATE/TIME:    1/8/2018 11:59 PM    CLINICAL HISTORY:    73 years old, female; Pain.  Initial Injury or trauma; Fall; Sprain or   strain, lumbar ligaments; Injury date: 01/08/2018.  \"fell several days ago   injuring her lower back.    TECHNIQUE:    Frontal and lateral views of the lumbar spine.    COMPARISON:    CR - XR HIP W OR WO PELVIS 2-3 VIEW RIGHT 2018-01-08 18:26    FINDINGS:    No visualized acute lumbar fracture.  Grade 1 anterolisthesis of L5 on S1   mild dextroconvex curvature of the lower lumbar spine.      Lumbar degenerative disc disease is moderate at L4-L5 and severe L5-S1.    There is moderate to severe multilevel lumbar facet arthrosis.      Chronic-appearing mid sacral fracture.  Nondisplaced acute right pubic   fractures.      Impression:         No visualized acute lumbar injury.  Chronic lumbar degenerative changes,   particularly distally.      Chronic-appearing mid sacral fracture.  Nondisplaced acute right pubic   fractures.    THIS DOCUMENT HAS BEEN ELECTRONICALLY SIGNED BY MEERA WOOTEN MD    XR Hip With or Without Pelvis 2 - 3 View Right [[id number]] Collected:  01/09/18 0906     Updated:  01/09/18 0906    Narrative:       EXAMINATION: XR HIP W OR WO PELVIS 2-3 VIEW RIGHT-      INDICATION: Fall.      COMPARISON: None.     FINDINGS: Imaging of the pelvis and two views of the right hip reveal  degenerative changes seen within the lower lumbar spine and sacroiliac  joints bilaterally. There is deformity identified of the left pubic  symphysis possibly from prior healed injury. Clinical correlation is  needed. The right hip is unremarkable. No fracture or dislocation is  seen within the right hip. Sacroiliac joints reveal degenerative changes  seen bilaterally.           Impression:       Deformity at the left pubic symphysis possibly from prior  healed " injury. No acute bony abnormality is identified. The right hip is  intact and unremarkable.  Degenerative change is seen within the  sacroiliac joints bilaterally.     D:  01/08/2018  E:  01/09/2018                Results Review:   I reviewed the patient's new clinical results.  I reviewed the patient's new imaging results and agree with the interpretation.  I reviewed the patient's other test results and agree with the interpretation  Discussed with patient and daughter    Medication Review:  done    Principal Problem:    Multiple closed fractures of pelvis with stable disruption of pelvic [place]  Active Problems:    History of pelvic fracture    Fall at home, initial encounter    Osteoporosis    Frequent falls    Hypokalemia    Head trauma      Assessment/Plan     She has new fracture medial to the weightbearing surface of the right acetabulum.  She has old healed fractures of the left superior and inferior pubic rami.  She does a have a history of multiple falls.  Already been up with physical therapy.  Since his fracture does not involve most of the weightbearing surface I will not expect that this will require surgery. She will have a slow rehabilitation should undergo physical therapy while here and look at going to a rehabilitation center or after area        Mars Philip MD - 01/09/18, 4:55 PM

## 2018-01-09 NOTE — THERAPY EVALUATION
Acute Care - Physical Therapy Initial Evaluation  [facility]     Patient Name: Stefanie Russell  : 1945  MRN: [id number]  Today's Date: 2018   Onset of Illness/Injury or Date of Surgery Date: 18  Date of Referral to PT: 18  Referring Physician: Shamir CHINO      Admit Date: 2018     Visit Dx:    ICD-10-CM ICD-9-CM   1. Multiple closed fractures of pelvis with stable disruption of pelvic ring, initial encounter S32.810A 808.43   2. Intractable pain R52 780.96   3. Impaired functional mobility, balance, gait, and endurance Z74.09 V49.89     Patient Active Problem List   Diagnosis   • Multiple closed fractures of pelvis with stable disruption of pelvic [place]   • History of pelvic fracture   • Fall at home, initial encounter   • Osteoporosis   • Frequent falls   • Hypokalemia   • Head trauma     Past Medical History:   Diagnosis Date   • High cholesterol    • History of pelvic fracture 2018   • Hyperlipidemia    • Hypertension    • Osteoporosis      Past Surgical History:   Procedure Laterality Date   • COLON SURGERY     • HYSTERECTOMY     • SHOULDER SURGERY      rotator cuff (left)   • TONSILLECTOMY            PT ASSESSMENT (last 72 hours)      PT Evaluation       18 1500 18 1412    Rehab Evaluation    Document Type evaluation  -SC     Subjective Information agree to therapy;complains of;pain  -SC     Patient Effort, Rehab Treatment good  -SC     Symptoms Noted During/After Treatment none  -SC     General Information    Patient Profile Review yes  -SC     Onset of Illness/Injury or Date of Surgery Date 18  -SC     Referring Physician Shamir CHINO  -SC     General Observations in bed  -SC     Pertinent History Of Current Problem fell at home resulting in pelvic fx R  -SC     Precautions/Limitations fall precautions  -SC     Prior Level of Function independent:;all household mobility;gait;transfer;ADL's;bed mobility  -SC     Equipment Currently Used at Home cane,  straight   occationally  -SC cane, straight  -MB    Living Environment    Lives With spouse  -SC spouse  -MB    Living Arrangements house  -SC house  -MB    Home Accessibility no concerns  -SC     Transportation Available  car;family or friend will provide  -MB    Clinical Impression    Date of Referral to PT 01/08/18  -SC     PT Diagnosis impaired mobility  -SC     Patient/Family Goals Statement to improve mobility and pain  -SC     Criteria for Skilled Therapeutic Interventions Met yes;treatment indicated  -SC     Pathology/Pathophysiology Noted (Describe Specifically for Each System) musculoskeletal  -SC     Impairments Found (describe specific impairments) gait, locomotion, and balance  -SC     Rehab Potential good, to achieve stated therapy goals  -SC     Pain Assessment    Pain Assessment 0-10  -SC     Pain Score 0  -SC     Post Pain Score 4  -SC     Pain Type Acute pain  -SC     Pain Location Pelvis  -SC     Pain Orientation Right  -SC     Pain Intervention(s) Repositioned  -SC     Response to Interventions improved  -SC     Cognitive Assessment/Intervention    Current Cognitive/Communication Assessment functional  -SC     Orientation Status oriented x 4  -SC     Follows Commands/Answers Questions 100% of the time  -SC     Personal Safety WNL/WFL  -SC     Personal Safety Interventions gait belt;fall prevention program maintained  -SC     ROM (Range of Motion)    General ROM lower extremity range of motion deficits identified  -SC     General LE Assessment    ROM Detail R hip limited by pain  -SC     MMT (Manual Muscle Testing)    General MMT Assessment lower extremity strength deficits identified  -SC     Lower Extremity    Lower Ext Manual Muscle Testing Detail R LE limited by pain - Strength grossly 3 to 4/5 function  -SC     Mobility Assessment/Training    Extremity Weight-Bearing Status right lower extremity  -SC     Right Upper Extremity Weight-Bearing weight-bearing as tolerated  -SC     Bed Mobility,  Assessment/Treatment    Bed Mobility, Assistive Device bed rails;head of bed elevated  -SC     Bed Mob, Supine to Sit, [place] moderate assist (50% patient effort);verbal cues required  -SC     Bed Mob, Sit to Supine, [place] maximum assist (25% patient effort);verbal cues required  -SC     Bed Mobility, Impairments pain;motor control impaired  -SC     Bed Mobility, Comment up to edg of bed with slow movements and use of railing. Very difficult getting back to bed 2 to her overall height-   -SC     Transfer Assessment/Treatment    Transfers, Sit-Stand [place] contact guard assist;verbal cues required  -SC     Transfers, Stand-Sit [place] contact guard assist;verbal cues required  -SC     Transfers, Sit-Stand-Sit, Assist Device rolling walker  -SC     Toilet Transfer, [place] minimum assist (75% patient effort);verbal cues required  -SC     Toilet Transfer, Assistive Device rolling walker  -SC     Transfer, Impairments pain;motor control impaired  -SC     Transfer, Comment cues for safety and hand placement.   -SC     Gait Assessment/Treatment    Gait, [place] Level minimum assist (75% patient effort);verbal cues required  -SC     Gait, Assistive Device rolling walker  -SC     Gait, Distance (Feet) 4  -SC     Gait, Gait Pattern Analysis swing-to gait  -SC     Gait, Gait Deviations right:;antalgic;stephanie decreased  -SC     Gait, Safety Issues balance decreased during turns  -SC     Gait, Impairments pain;motor control impaired;impaired balance  -SC     Gait, Comment cues for sequencing walker and getting  over to [facility] then back  -SC     Therapy Exercises    Bilateral Lower Extremities 10 reps;AROM:;supine;ankle pumps/circles;quad sets;glut sets;sitting;LAQ  -SC     Positioning and Restraints    Pre-Treatment Position in bed  -SC     Post Treatment Position bed  -SC     In Bed supine;call light within reach;encouraged to call for assist;exit alarm on;patient within staff view  -SC        01/09/18 0155 01/09/18 0148    General Information    Equipment Currently Used at Home  cane, straight  -JI    Living Environment    Lives With sibling(s);spouse  -JI     Living Arrangements house  -JI     Home Accessibility no concerns  -JI     Stair Railings at Home none  -JI     Type of Financial/Environmental Concern none  -JI     Transportation Available car  -JI       User Key  (r) = Recorded By, (t) = Taken By, (c) = Cosigned By    Initials Name Provider Type    SC Emily hOara, PT Physical Therapist    BIANCA Flores, KARISSA Case Manager    POLI Arroyo RN Registered Nurse          Physical Therapy Education     Title: PT OT SLP Therapies (Done)     Topic: Physical Therapy (Done)     Point: Mobility training (Done)    Learning Progress Summary    Learner Readiness Method Response Comment Documented by Status   Patient Eager TEN THOMAS DU,NR reviewed benefits of activity SC 01/09/18 1704 Done               Point: Home exercise program (Done)    Learning Progress Summary    Learner Readiness Method Response Comment Documented by Status   Patient TEN Godinez DU,NR reviewed benefits of activity SC 01/09/18 1704 Done               Point: Body mechanics (Done)    Learning Progress Summary    Learner Readiness Method Response Comment Documented by Status   Patient TEN Godinez DU,NR reviewed benefits of activity SC 01/09/18 1704 Done               Point: Precautions (Done)    Learning Progress Summary    Learner Readiness Method Response Comment Documented by Status   Patient TEN Godinez DU,NR reviewed benefits of activity SC 01/09/18 1704 Done                      User Key     Initials Effective Dates Name Provider Type Discipline    SC 06/19/15 -  Emily Ohara, PT Physical Therapist PT                PT Recommendation and Plan  Anticipated Equipment Needs At Discharge: front wheeled walker, bedside commode  Anticipated Discharge Disposition: home with home health  Planned Therapy Interventions: gait  training, bed mobility training, balance training, patient/family education, strengthening, transfer training  PT Frequency: daily  Plan of Care Review  Plan Of Care Reviewed With: patient  Progress: improving  Outcome Summary/Follow up Plan: Began out of bed mobility today. Patient able to tolerate Wt bearing and take a few slow steps to commode.  WIll continue working with her.  I expect her to go home with home health.  He has a supportive family          IP PT Goals       01/09/18 1704          Bed Mobility PT LTG    Bed Mobility PT LTG, Date Established 01/09/18  -SC      Bed Mobility PT LTG, Time to Achieve 5 - 7 days  -SC      Bed Mobility PT LTG, Activity Type supine to sit/sit to supine;sidelying to sit/sit to sidelying  -SC      Bed Mobility PT LTG, [place] Level conditional independence  -SC      Bed Mobility PT Goal  LTG, Assist Device bed rails  -SC      Bed Mobility PT LTG, Outcome goal ongoing  -SC      Transfer Training PT LTG    Transfer Training PT LTG, Date Established 01/09/18  -SC      Transfer Training PT LTG, Time to Achieve 5 - 7 days  -SC      Transfer Training PT LTG, Activity Type sit to stand/stand to sit  -SC      Transfer Training PT LTG, [place] Level contact guard assist  -SC      Transfer Training PT LTG, Assist Device walker, rolling  -SC      Transfer Training PT LTG, Outcome goal ongoing  -SC      Gait Training PT LTG    Gait Training Goal PT LTG, Date Established 01/09/18  -SC      Gait Training Goal PT LTG, Time to Achieve 5 - 7 days  -SC      Gait Training Goal PT LTG, [place] Level contact guard assist  -SC      Gait Training Goal PT LTG, Assist Device walker, rolling  -SC      Gait Training Goal PT LTG, Distance to Achieve 75  -SC      Gait Training Goal PT LTG, Outcome goal ongoing  -SC        User Key  (r) = Recorded By, (t) = Taken By, (c) = Cosigned By    Initials Name Provider Type    ELVIA Ohara, PT Physical Therapist                Outcome Measures        01/09/18 1500          How much help from another person do you currently need...    Turning from your back to your side while in flat bed without using bedrails? 2  -SC      Moving from lying on back to sitting on the side of a flat bed without bedrails? 2  -SC      Moving to and from a bed to a chair (including a wheelchair)? 2  -SC      Standing up from a chair using your arms (e.g., wheelchair, bedside chair)? 2  -SC      Climbing 3-5 steps with a railing? 1  -SC      To walk in hospital room? 2  -SC      AM-PAC 6 Clicks Score 11  -SC      Functional Assessment    Outcome Measure Options AM-PAC 6 Clicks Basic Mobility (PT)  -SC        User Key  (r) = Recorded By, (t) = Taken By, (c) = Cosigned By    Initials Name Provider Type    SC Emily Ohara, PT Physical Therapist           Time Calculation:         PT Charges       01/09/18 1707          Time Calculation    PT Received On 01/09/18  -SC      PT Goal Re-Cert Due Date 01/19/18  -SC        User Key  (r) = Recorded By, (t) = Taken By, (c) = Cosigned By    Initials Name Provider Type    SC Emily Ohara, PT Physical Therapist          Therapy Charges for Today     Code Description Service Date Service Provider Modifiers Qty    [id number] HC PT EVAL LOW COMPLEXITY 4 1/9/2018 Emily Ohara, PT GP 1          PT G-Codes  Outcome Measure Options: AM-PAC 6 Clicks Basic Mobility (PT)      Emily Ohara, PT  1/9/2018

## 2018-01-09 NOTE — PROGRESS NOTES
Discharge Planning Assessment  [facility]     Patient Name: Stefanie Russell  MRN: [id number]  Today's Date: 1/9/2018    Admit Date: 1/8/2018          Discharge Needs Assessment       01/09/18 1412    Living Environment    Lives With spouse    Living Arrangements house    Transportation Available car;family or friend will provide    Living Environment    Provides Primary Care For no one    Quality Of Family Relationships supportive    Able to Return to Prior Living Arrangements yes    Discharge Needs Assessment    Equipment Currently Used at Home cane, straight            Discharge Plan       01/09/18 1414    Case Management/Social Work Plan    Plan Home    Patient/Family In Agreement With Plan yes    Additional Comments Spoke with patient at bedside. She lives with her  in a one story house in [facility] she was independent with ADL's and used a straight cane. DME/HH needs pending therapy eval. Family available to transport at [place]. Goal is to return home. CM will follow.         Discharge Placement     No information found                Demographic Summary       01/09/18 1357    Referral Information    Arrived From home or self-care    Reason For Consult discharge planning            Functional Status       01/09/18 1412    Functional Status Prior    Ambulation 1-->assistive equipment    Transferring 0-->independent    Toileting 0-->independent    Bathing 0-->independent    Dressing 0-->independent    Eating 0-->independent    Communication 0-->understands/communicates without difficulty    Swallowing 0-->swallows foods/liquids without difficulty    Activity Tolerance    Usual Activity Tolerance moderate    Current Activity Tolerance moderate            Psychosocial     None            Abuse/Neglect     None            Legal     None            Substance Abuse     None            Patient Forms     None          Zayda Flores RN

## 2018-01-09 NOTE — H&P
"    [facility] Medicine Services  HISTORY AND PHYSICAL    Patient Name: Stefanie Russell  : 1945  MRN: [id number]  Primary Care Physician: Eric Brunson DO    Subjective   Subjective     Chief Complaint:  Right pelvic pain.     HPI:  Stefanie Russell is a 73 y.o. female with h/o HTN, Hyperlipemia, osteoporosis according to pt. Previously on Fosamax but d/ken on her own. Pt daughter Inez is present in ED. PT reports she was lightly her 's Cigarette.     Pt lives at home where she cares for her  who has advance [employer]. She states she was \"lighting a cigarette\" for her  when her feet \"got hung up\" and she fell and landed on the right hip. She states she was not able to stand and her son her up and drove her to the ED. She was not able to bear weight on the right hip. And states her pain was 10/10. Minimal relief in ED with IV Dilaudid. Pt also reports she had previous left pelvic fracture years ago. And she had fall several days ago at home when she twisted her feet. She states she has has some pain in her back since then but did not seek medical treatment. She states she also hit her head when she fell several days ago. No LOC, No dizziness, Denies CP, denies SOA. She does states she had a tender area in the left occipital region for several days after the fall.       Review of Systems   Constitutional: Negative for chills, fatigue and fever.   HENT: Negative for congestion, ear pain, rhinorrhea and sore throat.    Respiratory: Negative for cough, shortness of breath and wheezing.    Cardiovascular: Negative for chest pain, palpitations and leg swelling.   Gastrointestinal: Negative for abdominal pain, blood in stool, constipation, diarrhea, nausea and vomiting.   Genitourinary: Negative for dysuria and hematuria.        States she is not able to void on bedpan.    Musculoskeletal: Positive for arthralgias, back pain (mild LBP from a fall and few days ago. ) and " gait problem (not able to bear weight).   Skin: Negative.    Neurological: Negative for dizziness, weakness, light-headedness, numbness and headaches.   Psychiatric/Behavioral: Negative for dysphoric mood and sleep disturbance. The patient is not nervous/anxious.           Otherwise 10-system ROS reviewed and is negative except as mentioned in the HPI.    Personal History     Past Medical History:   Diagnosis Date   • Hyperlipidemia    • Hypertension        Past Surgical History:   Procedure Laterality Date   • COLON SURGERY     • HYSTERECTOMY     • SHOULDER SURGERY      rotator cuff (left)   • TONSILLECTOMY         Family History: family history includes Heart disease in her father; Heart failure in her father; Lymphoma in her mother; Melanoma in her mother; Stroke in her father.     Social History:  reports that she has never smoked. She does not have any smokeless tobacco history on file. She reports that she does not drink alcohol or use illicit drugs.  Social History     Social History Narrative       Medications:  No current facility-administered medications on file prior to encounter.      Current Outpatient Prescriptions on File Prior to Encounter   Medication Sig Dispense Refill   • atenolol (TENORMIN) 25 MG tablet Take 25 mg by mouth Daily.     • HYDROcodone-acetaminophen (NORCO) 5-325 MG per tablet Take 1-2 tablets by mouth Every 4 (Four) Hours As Needed for severe pain (7-10). 12 tablet 0   • lisinopril (PRINIVIL,ZESTRIL) 20 MG tablet Take 20 mg by mouth Daily.     • lovastatin (MEVACOR) 20 MG tablet Take 20 mg by mouth Every Night.         No Known Allergies    Objective   Objective     Vital Signs:   Temp:  [97.8 °F (36.6 °C)] 97.8 °F (36.6 °C)  Heart Rate:  [69-79] 74  Resp:  [16-20] 16  BP: (112-150)/(57-79) 112/57        Physical Exam   Constitutional: She is oriented to person, place, and time. She appears well-nourished. No distress.   HENT:   Head: Atraumatic.   Right Ear: External ear normal.    Left Ear: External ear normal.   Eyes: Conjunctivae are normal. Right eye exhibits no discharge. Left eye exhibits no discharge.   Neck: Normal range of motion. Neck supple.   Cardiovascular: Normal rate and regular rhythm.    No murmur heard.  Pedal pulses intact but diminished    Pulmonary/Chest: Effort normal and breath sounds normal. She has no wheezes. She has no rales.   Abdominal: Soft. Bowel sounds are normal. She exhibits no distension. There is no tenderness.   Musculoskeletal: She exhibits no edema.        Right hip: She exhibits decreased range of motion and tenderness.        Right foot: There is deformity (loss of arch on right foot  ).   Right lower ext externally rotated. Pain right groin with any movement of lower ext    Neurological: She is alert and oriented to person, place, and time.   Skin: No rash noted. She is not diaphoretic.   Psychiatric: She has a normal mood and affect. Her behavior is normal. Thought content normal.   Nursing note and vitals reviewed.         Results Reviewed:  I have personally reviewed current lab, radiology, and data and agree.      Results from last 7 days  Lab Units 01/08/18  1939   WBC 10*3/mm3 10.48   HEMOGLOBIN g/dL 12.5   HEMATOCRIT % 36.5   PLATELETS 10*3/mm3 227       Results from last 7 days  Lab Units 01/08/18  1939   SODIUM mmol/L 136   POTASSIUM mmol/L 3.2*   CHLORIDE mmol/L 103   CO2 mmol/L 24.0   BUN mg/dL 19   CREATININE mg/dL 0.90   GLUCOSE mg/dL 113*   CALCIUM mg/dL 8.6*     Brief Urine Lab Results     None        No results found for: BNP  No results found for: PHART  Imaging Results (last 24 hours)     Procedure Component Value Units Date/Time    XR Hip With or Without Pelvis 2 - 3 View Right [[id number]] Updated:  01/08/18 1829    CT Pelvis Without Contrast [[id number]] Collected:  01/08/18 1832     Updated:  01/08/18 1933    Narrative:       EXAM:    CT Pelvis Without Intravenous Contrast    EXAM DATE/TIME:    1/8/2018 6:32  PM    CLINICAL  HISTORY:    73 years old, female; Trauma.    TECHNIQUE:    Axial computed tomography images of the pelvis without intravenous contrast.    All CT scans at this facility use one or more dose reduction techniques, viz.:   automated exposure control; ma/kV adjustment per patient size (including   targeted exams where dose is matched to indication; i.e. head); or iterative   reconstruction technique.    Coronal and sagittal reformatted images were created and reviewed.    COMPARISON:    No relevant prior studies available.    FINDINGS:    Acute, nondisplaced fracture at the junction between the right acetabulum and   superior pubic ramus.  Acute, nondisplaced fracture of the right inferior pubic   ramus.      Healed bilateral sacral and left parasymphyseal pubic fractures.  No   visualized acute proximal femoral fracture or hip dislocation.  Moderate   chronic arthrosis at the bilateral sacroiliac joints.  Severe chronic   degenerative changes in the lower lumbar spine.  No aggressive osseous lesion.      No large pelvic sidewall or other soft tissue hematoma.  Mild distention of   the urinary bladder.  Colonic constipation.      Impression:         Acute fractures at the junction between the right acetabulum and superior   pubic ramus, and involving the right inferior pubic ramus.  Healed bilateral   sacral and left parasymphyseal pubic fractures.  No acute proximal femoral   fracture or hip dislocation.    THIS DOCUMENT HAS BEEN ELECTRONICALLY SIGNED BY MEERA WOOTEN MD             Assessment/Plan   Assessment / Plan      Principal Problem:    Multiple closed fractures of pelvis with stable disruption of pelvic [place]  Active Problems:    History of pelvic fracture    Fall at home, initial encounter    Osteoporosis    Frequent falls    Hypokalemia    Head trauma    -admit to observation.   -IV Dilaudid prn for pain.   -replace potassium, check mag level.  -Get CT of head without contrast  -Consul ortho in AM. Not likely  surgical at this time. Will consult for input on management and follow up.   -Consult PT  -We will ask Case Management to see pt to evaluate home situation due to multiple falls. Lives with her  and her son.     DVT prophylaxis: lovenox, TEDs and SCDs     CODE STATUS:  Full Code    Admission Status:  I believe this patient meets OBSERVATION status, however if further evaluation or treatment plans warrant, status may change.  Based upon current information, I predict patient's care encounter to be less than or equal to 2 midnights.          Lizette Ellis DO   01/08/18   9:33 PM

## 2018-01-10 LAB
ANION GAP SERPL CALCULATED.3IONS-SCNC: 6 MMOL/L (ref 3–11)
BUN BLD-MCNC: 18 MG/DL (ref 9–23)
BUN/CREAT SERPL: 25.7 (ref 7–25)
CALCIUM SPEC-SCNC: 8.2 MG/DL (ref 8.7–10.4)
CHLORIDE SERPL-SCNC: 102 MMOL/L (ref 99–109)
CO2 SERPL-SCNC: 26 MMOL/L (ref 20–31)
CREAT BLD-MCNC: 0.7 MG/DL (ref 0.6–1.3)
GFR SERPL CREATININE-BSD FRML MDRD: 82 ML/MIN/1.73
GLUCOSE BLD-MCNC: 107 MG/DL (ref 70–100)
POTASSIUM BLD-SCNC: 4.2 MMOL/L (ref 3.5–5.5)
SODIUM BLD-SCNC: 134 MMOL/L (ref 132–146)

## 2018-01-10 PROCEDURE — 97116 GAIT TRAINING THERAPY: CPT

## 2018-01-10 PROCEDURE — 99233 SBSQ HOSP IP/OBS HIGH 50: CPT | Performed by: HOSPITALIST

## 2018-01-10 PROCEDURE — 25010000002 ENOXAPARIN PER 10 MG: Performed by: NURSE PRACTITIONER

## 2018-01-10 PROCEDURE — 80048 BASIC METABOLIC PNL TOTAL CA: CPT | Performed by: INTERNAL MEDICINE

## 2018-01-10 PROCEDURE — 25010000002 ONDANSETRON PER 1 MG: Performed by: NURSE PRACTITIONER

## 2018-01-10 PROCEDURE — 63710000001 PROMETHAZINE PER 12.5 MG: Performed by: HOSPITALIST

## 2018-01-10 PROCEDURE — 97110 THERAPEUTIC EXERCISES: CPT

## 2018-01-10 RX ORDER — SODIUM CHLORIDE 9 MG/ML
50 INJECTION, SOLUTION INTRAVENOUS CONTINUOUS
Status: DISCONTINUED | OUTPATIENT
Start: 2018-01-10 | End: 2018-01-13 | Stop reason: HOSPADM

## 2018-01-10 RX ORDER — HYDROCODONE BITARTRATE AND ACETAMINOPHEN 5; 325 MG/1; MG/1
1 TABLET ORAL EVERY 6 HOURS PRN
Status: DISCONTINUED | OUTPATIENT
Start: 2018-01-10 | End: 2018-01-13

## 2018-01-10 RX ORDER — AMLODIPINE BESYLATE 5 MG/1
5 TABLET ORAL
Status: DISCONTINUED | OUTPATIENT
Start: 2018-01-10 | End: 2018-01-11

## 2018-01-10 RX ORDER — METHOCARBAMOL 500 MG/1
1000 TABLET, FILM COATED ORAL EVERY 8 HOURS SCHEDULED
Status: DISCONTINUED | OUTPATIENT
Start: 2018-01-10 | End: 2018-01-11

## 2018-01-10 RX ORDER — VENLAFAXINE HYDROCHLORIDE 75 MG/1
75 CAPSULE, EXTENDED RELEASE ORAL
Status: DISCONTINUED | OUTPATIENT
Start: 2018-01-10 | End: 2018-01-13 | Stop reason: HOSPADM

## 2018-01-10 RX ORDER — PROMETHAZINE HYDROCHLORIDE 12.5 MG/1
6.25 TABLET ORAL EVERY 6 HOURS PRN
Status: DISCONTINUED | OUTPATIENT
Start: 2018-01-10 | End: 2018-01-13 | Stop reason: HOSPADM

## 2018-01-10 RX ADMIN — HYDROCODONE BITARTRATE AND ACETAMINOPHEN 1 TABLET: 5; 325 TABLET ORAL at 17:41

## 2018-01-10 RX ADMIN — ONDANSETRON 4 MG: 2 INJECTION INTRAMUSCULAR; INTRAVENOUS at 08:31

## 2018-01-10 RX ADMIN — LISINOPRIL 20 MG: 20 TABLET ORAL at 08:31

## 2018-01-10 RX ADMIN — OXYCODONE AND ACETAMINOPHEN 1 TABLET: 5; 325 TABLET ORAL at 02:28

## 2018-01-10 RX ADMIN — IBUPROFEN 400 MG: 400 TABLET ORAL at 21:08

## 2018-01-10 RX ADMIN — SODIUM CHLORIDE 50 ML/HR: 9 INJECTION, SOLUTION INTRAVENOUS at 11:02

## 2018-01-10 RX ADMIN — ONDANSETRON 4 MG: 2 INJECTION INTRAMUSCULAR; INTRAVENOUS at 03:15

## 2018-01-10 RX ADMIN — AMLODIPINE BESYLATE 5 MG: 5 TABLET ORAL at 11:02

## 2018-01-10 RX ADMIN — METHOCARBAMOL 1000 MG: 500 TABLET ORAL at 21:05

## 2018-01-10 RX ADMIN — PROMETHAZINE HYDROCHLORIDE 6.25 MG: 12.5 TABLET ORAL at 11:01

## 2018-01-10 RX ADMIN — ATENOLOL 25 MG: 25 TABLET ORAL at 08:31

## 2018-01-10 RX ADMIN — VENLAFAXINE HYDROCHLORIDE 75 MG: 75 CAPSULE, EXTENDED RELEASE ORAL at 11:02

## 2018-01-10 RX ADMIN — ENOXAPARIN SODIUM 40 MG: 40 INJECTION SUBCUTANEOUS at 08:31

## 2018-01-10 NOTE — PLAN OF CARE
Problem: Patient Care Overview (Adult)  Goal: Plan of Care Review  Outcome: Ongoing (interventions implemented as appropriate)   01/10/18 0151   Coping/Psychosocial Response Interventions   Plan Of Care Reviewed With patient   Patient Care Overview   Progress improving   Outcome Evaluation   Outcome Summary/Follow up Plan [place] able [place] [facility]       Problem: Fall Risk (Adult)  Goal: Identify Related Risk Factors and Signs and Symptoms  Outcome: Ongoing (interventions implemented as appropriate)   01/10/18 0151   Fall Risk   Fall Risk: Related Risk Factors history of falls   Fall Risk: Signs and Symptoms presence of risk factors       Problem: Pain, Acute (Adult)  Goal: Identify Related Risk Factors and Signs and Symptoms  Outcome: Ongoing (interventions implemented as appropriate)   01/10/18 0151   Pain, Acute   Related Risk Factors (Acute Pain) disease process   Signs and Symptoms (Acute Pain) fear of reinjury

## 2018-01-10 NOTE — PROGRESS NOTES
Continued Stay Note  [facility]     Patient Name: Stefanie Russell  MRN: [id number]  Today's Date: 1/10/2018    Admit Date: 1/8/2018          Discharge Plan       01/10/18 1020    Case Management/Social Work Plan    Plan Home w/     Patient/Family In Agreement With Plan yes    Additional Comments Spoke with patient at bedside. Per therapy recs she would benefit from a RW and HH/PT. Discussed with patient and she is agreeable to both. Per her request a referral has been made to [facility] for a RW, this will be delivered to her room today, and a referral to [facility] for PT. Walker is following. Plan is to return home w/ [facility]. CM will follow.               Discharge Codes     None            Zayda Flores RN

## 2018-01-10 NOTE — PROGRESS NOTES
[facility] Medicine Services  PROGRESS NOTE    Patient Name: Stefanie Russell  : 1945  MRN: [id number]    Date of Admission: 2018  Length of Stay: 1  Primary Care Physician: Eric Brunson DO    Subjective   Subjective     CC: pelvic pain    HPI: asking to restart Effexor today.  Looks dehydrated.  She wasn't to have as little exposure to pain medication as possible.  She says she does not want to be addicted    Review of Systems  Gen- No fevers, chills  CV- No chest pain, palpitations  Resp- No cough, dyspnea  GI- No N/V/D, abd pain    Otherwise ROS is negative except as mentioned in the HPI.    Objective   Objective     Vital Signs:   Temp:  [97.3 °F (36.3 °C)-98.1 °F (36.7 °C)] 98 °F (36.7 °C)  Heart Rate:  [65-88] 77  Resp:  [16-18] 16  BP: (101-166)/(56-68) 120/58        Physical Exam:  Constitutional: No acute distress, awake, alert  HENT: NCAT, mucous membranes moist  Respiratory: Clear to auscultation bilaterally, respiratory effort normal   Cardiovascular: RRR, no murmurs, rubs, or gallops, palpable pedal pulses bilaterally  Gastrointestinal: Positive bowel sounds, soft, nontender, nondistended  Musculoskeletal: No bilateral ankle edema  Psychiatric: Appropriate affect, cooperative  Neurologic: Oriented x 3, strength symmetric in all extremities, Cranial Nerves grossly intact to confrontation, speech clear  Skin: No rashes    Results Reviewed:  I have personally reviewed current lab, radiology, and data and agree.      Results from last 7 days  Lab Units 18  193   WBC 10*3/mm3 10.48   HEMOGLOBIN g/dL 12.5   HEMATOCRIT % 36.5   PLATELETS 10*3/mm3 227       Results from last 7 days  Lab Units 01/10/18  0628 18  0824 18  1939   SODIUM mmol/L 134  --  136   POTASSIUM mmol/L 4.2 4.0 3.2*   CHLORIDE mmol/L 102  --  103   CO2 mmol/L 26.0  --  24.0   BUN mg/dL 18  --  19   CREATININE mg/dL 0.70  --  0.90   GLUCOSE mg/dL 107*  --  113*   CALCIUM mg/dL  8.2*  --  8.6*     No results found for: BNP  No results found for: PHART    Microbiology Results Abnormal     None        CT pelvis reviewed with right sided fracture noted. Agree with interpretation.    Imaging Results (last 24 hours)     Procedure Component Value Units Date/Time    XR Hip With or Without Pelvis 2 - 3 View Right [[id number]] Collected:  01/09/18 0906     Updated:  01/10/18 1212    Narrative:       EXAMINATION: XR HIP W OR WO PELVIS 2-3 VIEW RIGHT-      INDICATION: Fall.      COMPARISON: None.     FINDINGS: Imaging of the pelvis and two views of the right hip reveal  degenerative changes seen within the lower lumbar spine and sacroiliac  joints bilaterally. There is deformity identified of the left pubic  symphysis possibly from prior healed injury. Clinical correlation is  needed. The right hip is unremarkable. No fracture or dislocation is  seen within the right hip. Sacroiliac joints reveal degenerative changes  seen bilaterally.           Impression:       Deformity at the left pubic symphysis possibly from prior  healed injury. No acute bony abnormality is identified. The right hip is  intact and unremarkable.  Degenerative change is seen within the  sacroiliac joints bilaterally.     D:  01/08/2018  E:  01/09/2018     This report was finalized on 1/10/2018 12:10 PM by Dr. Arabella De La Fuente MD.                I have reviewed the medications.    Assessment/Plan   Assessment / Plan     Hospital Problem List     * (Principal)Multiple closed fractures of pelvis with stable disruption of pelvic [place]    History of pelvic fracture    Fall at home, initial encounter    Osteoporosis    Frequent falls    Hypokalemia    Head trauma             Brief Hospital Course to date:  Stefanie Russell is a 73 y.o. female who presented from home after multiple falls found to have right pelvic fracture.    Assessment & Plan:  --Awaiting ortho confirmation but fracture likely to be managed non-operatively. Continue  pain control and await PT eval.  --Has had history of multiple recent falls, all mechanical in nature per patient and daughter. Awaiting PT/OT, but likely needs rehab.  --BP control  --Replace K+.  --de-escalate pain medication to try to get home on oral meds  --restart Effexor today  --IV fluids    DVT Prophylaxis:  Lovenox    CODE STATUS: Full Code    Disposition: I expect the patient to be discharged to rehab in 1-2 days vs home    Tree Amador MD  01/10/18  6:43 PM

## 2018-01-10 NOTE — PLAN OF CARE
Problem: Patient Care Overview (Adult)  Goal: Plan of Care Review  Outcome: Ongoing (interventions implemented as appropriate)   01/10/18 1432   Coping/Psychosocial Response Interventions   Plan Of Care Reviewed With patient   Patient Care Overview   Progress progress toward functional goals is gradual   Outcome Evaluation   Outcome Summary/Follow up Plan Needed encouragement today to mobilize. c/o nausea. Did get up and walk to commode and back about 6 feet. Limited by pain and nausea.       Problem: Inpatient Physical Therapy  Goal: Bed Mobility Goal LTG- PT   01/09/18 1704   Bed Mobility PT LTG   Bed Mobility PT LTG, Date Established 01/09/18   Bed Mobility PT LTG, Time to Achieve 5 - 7 days   Bed Mobility PT LTG, Activity Type supine to sit/sit to supine;sidelying to sit/sit to sidelying   Bed Mobility PT LTG, [place] Level conditional independence   Bed Mobility PT Goal LTG, Assist Device bed rails   Bed Mobility PT LTG, Outcome goal ongoing     Goal: Transfer Training Goal 1 LTG- PT   01/09/18 1704   Transfer Training PT LTG   Transfer Training PT LTG, Date Established 01/09/18   Transfer Training PT LTG, Time to Achieve 5 - 7 days   Transfer Training PT LTG, Activity Type sit to stand/stand to sit   Transfer Training PT LTG, [place] Level contact guard assist   Transfer Training PT LTG, Assist Device walker, rolling   Transfer Training PT LTG, Outcome goal ongoing     Goal: Gait Training Goal LTG- PT  Outcome: Ongoing (interventions implemented as appropriate)   01/09/18 1704   Gait Training PT LTG   Gait Training Goal PT LTG, Date Established 01/09/18   Gait Training Goal PT LTG, Time to Achieve 5 - 7 days   Gait Training Goal PT LTG, [place] Level contact guard assist   Gait Training Goal PT LTG, Assist Device walker, rolling   Gait Training Goal PT LTG, Distance to Achieve 75   Gait Training Goal PT LTG, Outcome goal ongoing

## 2018-01-10 NOTE — THERAPY TREATMENT NOTE
"Acute Care - Physical Therapy Treatment Note  [facility]     Patient Name: Stefanie Russell  : 1945  MRN: [id number]  Today's Date: 1/10/2018  Onset of Illness/Injury or Date of Surgery Date: 18  Date of Referral to PT: 18  Referring Physician: Shamir CHINO    Admit Date: 2018    Visit Dx:    ICD-10-CM ICD-9-CM   1. Multiple closed fractures of pelvis with stable disruption of pelvic ring, initial encounter S32.810A 808.43   2. Intractable pain R52 780.96   3. Impaired functional mobility, balance, gait, and endurance Z74.09 V49.89     Patient Active Problem List   Diagnosis   • Multiple closed fractures of pelvis with stable disruption of pelvic [place]   • History of pelvic fracture   • Fall at home, initial encounter   • Osteoporosis   • Frequent falls   • Hypokalemia   • Head trauma               Adult Rehabilitation Note       01/10/18 1053          Rehab Assessment/Intervention    Discipline physical therapist   seen at 1053 for exercise and 1400 for gait  -SC      Document Type therapy note (daily note)  -SC      Subjective Information complains of;pain;weakness;nausea/vomiting   \"ifeel Nauseated\"  -SC      Patient Effort, Rehab Treatment good  -SC      Symptoms Noted During/After Treatment other (see comments);fatigue;increased pain   nauses during mbulation  -SC      Precautions/Limitations fall precautions  -SC      Recorded by [SC] Emily Ohara, PT      Vital Signs    Post SpO2 (%) 96  -SC      O2 Delivery Post Treatment room air  -SC      Recorded by [SC] Emily Ohara, PT      Pain Assessment    Pain Assessment [place] FACES  -SC      Clark-[place] FACES Pain Rating 0  -SC      Post Pain Score 6  -SC      Pain Type Acute pain  -SC      Pain Location Pelvis  -SC      Pain Orientation Right  -SC      Pain Intervention(s) Repositioned  -SC      Response to Interventions improved  -SC      Recorded by [SC] Emily Ohara, PT      Cognitive Assessment/Intervention    Current " Cognitive/Communication Assessment functional  -SC      Orientation Status oriented x 4  -SC      Follows Commands/Answers Questions 100% of the time  -SC      Personal Safety WNL/WFL  -SC      Personal Safety Interventions gait belt;fall prevention program maintained  -SC      Recorded by [SC] Emily Ohara, PT      Mobility Assessment/Training    Extremity Weight-Bearing Status right lower extremity  -SC      Right Upper Extremity Weight-Bearing weight-bearing as tolerated  -SC      Recorded by [SC] Emily Ohara, PT      Bed Mobility, Assessment/Treatment    Bed Mobility, Assistive Device bed rails;head of bed elevated;draw sheet  -SC      Bed Mob, Supine to Sit, [place] moderate assist (50% patient effort);verbal cues required  -SC      Bed Mobility, Impairments pain;motor control impaired;impaired balance;strength decreased  -SC      Bed Mobility, Comment up to edge of bed with cueing. Needs help with legs and trunk  -SC      Recorded by [SC] Emily Ohara, PT      Transfer Assessment/Treatment    Transfers, Sit-Stand [place] verbal cues required;contact guard assist  -SC      Transfers, Stand-Sit [place] verbal cues required;contact guard assist  -SC      Transfers, Sit-Stand-Sit, Assist Device rolling walker  -SC      Transfer, Impairments strength decreased;pain;motor control impaired  -SC      Transfer, Comment repeted cues for safty and to reach for  cair rails  -SC      Recorded by [SC] Emily Ohara, PT      Gait Assessment/Treatment    Gait, [place] Level minimum assist (75% patient effort)  -SC      Gait, Assistive Device rolling walker  -SC      Gait, Distance (Feet) 6  -SC      Gait, Gait Pattern Analysis swing-to gait  -SC      Gait, Gait Deviations left:;antalgic;weight-shifting ability decreased  -SC      Gait, Safety Issues balance decreased during turns  -SC      Gait, Impairments pain;motor control impaired;strength decreased  -SC      Gait, Comment cues for sequencing  walker. Occational assist to advance R leg  and wt shift.   -SC      Recorded by [SC] Emily Ohara, PT      Therapy Exercises    Bilateral Lower Extremities 15 reps;AROM:;supine;ankle pumps/circles;glut sets;quad sets;heel slides;hip abduction/adduction  -SC      Recorded by [SC] Emily Ohara, PT      Positioning and Restraints    Pre-Treatment Position in bed  -SC      Post Treatment Position chair  -SC      In Bed sitting;call light within reach;encouraged to call for assist;exit alarm on;with family/caregiver  -SC      Recorded by [SC] Emily Ohara, PT        User Key  (r) = Recorded By, (t) = Taken By, (c) = Cosigned By    Initials Name Effective Dates    SC Emily Ohara, PT 06/19/15 -                 IP PT Goals       01/09/18 1704          Bed Mobility PT LTG    Bed Mobility PT LTG, Date Established 01/09/18  -SC      Bed Mobility PT LTG, Time to Achieve 5 - 7 days  -SC      Bed Mobility PT LTG, Activity Type supine to sit/sit to supine;sidelying to sit/sit to sidelying  -SC      Bed Mobility PT LTG, [place] Level conditional independence  -SC      Bed Mobility PT Goal  LTG, Assist Device bed rails  -SC      Bed Mobility PT LTG, Outcome goal ongoing  -SC      Transfer Training PT LTG    Transfer Training PT LTG, Date Established 01/09/18  -SC      Transfer Training PT LTG, Time to Achieve 5 - 7 days  -SC      Transfer Training PT LTG, Activity Type sit to stand/stand to sit  -SC      Transfer Training PT LTG, [place] Level contact guard assist  -SC      Transfer Training PT LTG, Assist Device walker, rolling  -SC      Transfer Training PT LTG, Outcome goal ongoing  -SC      Gait Training PT LTG    Gait Training Goal PT LTG, Date Established 01/09/18  -SC      Gait Training Goal PT LTG, Time to Achieve 5 - 7 days  -SC      Gait Training Goal PT LTG, [place] Level contact guard assist  -SC      Gait Training Goal PT LTG, Assist Device walker, rolling  -SC      Gait Training Goal PT LTG,  Distance to Achieve 75  -SC      Gait Training Goal PT LTG, Outcome goal ongoing  -SC        User Key  (r) = Recorded By, (t) = Taken By, (c) = Cosigned By    Initials Name Provider Type    ELVIA Ohara, PT Physical Therapist          Physical Therapy Education     Title: PT OT SLP Therapies (Done)     Topic: Physical Therapy (Done)     Point: Mobility training (Done)    Learning Progress Summary    Learner Readiness Method Response Comment Documented by Status   Patient YUMIKO Carrillo NR reviewed benefits of activity SC 01/10/18 1431 Done    TEN Godinez DU, NR reviewed benefits of activity SC 01/09/18 1704 Done               Point: Home exercise program (Done)    Learning Progress Summary    Learner Readiness Method Response Comment Documented by Status   Patient YUMIKO Carrillo NR reviewed benefits of activity SC 01/10/18 1431 Done    TEN Godinez DU, NR reviewed benefits of activity SC 01/09/18 1704 Done               Point: Body mechanics (Done)    Learning Progress Summary    Learner Readiness Method Response Comment Documented by Status   Patient YUMIKO Carrillo NR reviewed benefits of activity SC 01/10/18 1431 Done    TEN Godinez DU, NR reviewed benefits of activity SC 01/09/18 1704 Done               Point: Precautions (Done)    Learning Progress Summary    Learner Readiness Method Response Comment Documented by Status   Patient YUMIKO Carrillo NR reviewed benefits of activity SC 01/10/18 1431 Done    TEN Godinez DU, NR reviewed benefits of activity SC 01/09/18 1704 Done                      User Key     Initials Effective Dates Name Provider Type Discipline    SC 06/19/15 -  Emily Ohara, PT Physical Therapist PT                    PT Recommendation and Plan  Anticipated Equipment Needs At Discharge: front wheeled walker, bedside commode  Anticipated Discharge Disposition: home with home health  Planned Therapy Interventions: gait training, bed mobility training, balance training, patient/family education,  strengthening, transfer training  PT Frequency: daily  Plan of Care Review  Plan Of Care Reviewed With: patient  Progress: progress toward functional goals is gradual  Outcome Summary/Follow up Plan: Needed encouragement today to mobilize. c/o nausea. Did get up and walk to commode and back about 6 feet. Limited by pain and nausea.          Outcome Measures       01/10/18 1053 01/09/18 1500       How much help from another person do you currently need...    Turning from your back to your side while in flat bed without using bedrails? 2  -SC 2  -SC     Moving from lying on back to sitting on the side of a flat bed without bedrails? 2  -SC 2  -SC     Moving to and from a bed to a chair (including a wheelchair)? 2  -SC 2  -SC     Standing up from a chair using your arms (e.g., wheelchair, bedside chair)? 2  -SC 2  -SC     Climbing 3-5 steps with a railing? 1  -SC 1  -SC     To walk in hospital room? 2  -SC 2  -SC     AM-PAC 6 Clicks Score 11  -SC 11  -SC     Functional Assessment    Outcome Measure Options AM-PAC 6 Clicks Basic Mobility (PT)  -SC AM-[facility] 6 Clicks Basic Mobility (PT)  -SC       User Key  (r) = Recorded By, (t) = Taken By, (c) = Cosigned By    Initials Name Provider Type    SC Emily Ohara, PT Physical Therapist           Time Calculation:         PT Charges       01/10/18 1434          Time Calculation    Start Time 1053  -SC      PT Received On 01/10/18  -SC      PT Goal Re-Cert Due Date 01/19/18  -SC      Time Calculation- PT    Total Timed Code Minutes- PT 30 minute(s)  -SC        User Key  (r) = Recorded By, (t) = Taken By, (c) = Cosigned By    Initials Name Provider Type    SC Emily Ohara, PT Physical Therapist          Therapy Charges for Today     Code Description Service Date Service Provider Modifiers Qty    23092825740 HC PT EVAL LOW COMPLEXITY 4 1/9/2018 Emily Ohara, PT GP 1    11347041936 HC PT THER PROC EA 15 MIN 1/10/2018 Emily Ohara, PT GP 1    28139525476 HC GAIT TRAINING EA 15  MIN 1/10/2018 Emily Ohara, PT GP 1          PT G-Codes  Outcome Measure Options: AM-PAC 6 Clicks Basic Mobility (PT)    Emily Ohara, PT  1/10/2018

## 2018-01-10 NOTE — PLAN OF CARE
Problem: Patient Care Overview (Adult)  Goal: Plan of Care Review  Outcome: Ongoing (interventions implemented as appropriate)   01/10/18 1429   Coping/Psychosocial Response Interventions   Plan Of Care Reviewed With patient;daughter   Patient Care Overview   Progress progress toward functional goals as expected       Problem: Fall Risk (Adult)  Goal: Identify Related Risk Factors and Signs and Symptoms  Outcome: Ongoing (interventions implemented as appropriate)   01/10/18 1429   Fall Risk   Fall Risk: Related Risk Factors age-related changes;history of falls;gait/mobility problems   Fall Risk: Signs and Symptoms presence of risk factors       Problem: Pain, Acute (Adult)  Goal: Identify Related Risk Factors and Signs and Symptoms  Outcome: Ongoing (interventions implemented as appropriate)   01/10/18 1429   Pain, Acute   Related Risk Factors (Acute Pain) disease process;patient perception;persistent pain   Signs and Symptoms (Acute Pain) fatigue/weakness       Problem: Pressure Ulcer Risk (Ed Scale) (Adult,Obstetrics,Pediatric)  Goal: Identify Related Risk Factors and Signs and Symptoms  Outcome: Ongoing (interventions implemented as appropriate)   01/10/18 1429   Pressure Ulcer Risk (Ed Scale)   Related Risk Factors (Pressure Ulcer Risk (Ed Scale)) age extremes;mobility impaired

## 2018-01-11 PROCEDURE — 99233 SBSQ HOSP IP/OBS HIGH 50: CPT | Performed by: HOSPITALIST

## 2018-01-11 PROCEDURE — 97110 THERAPEUTIC EXERCISES: CPT

## 2018-01-11 PROCEDURE — 25010000002 ENOXAPARIN PER 10 MG: Performed by: NURSE PRACTITIONER

## 2018-01-11 PROCEDURE — 97116 GAIT TRAINING THERAPY: CPT

## 2018-01-11 RX ORDER — DOCUSATE SODIUM 100 MG/1
100 CAPSULE, LIQUID FILLED ORAL 2 TIMES DAILY
Status: DISCONTINUED | OUTPATIENT
Start: 2018-01-11 | End: 2018-01-13 | Stop reason: HOSPADM

## 2018-01-11 RX ORDER — AMLODIPINE BESYLATE 2.5 MG/1
2.5 TABLET ORAL
Status: DISCONTINUED | OUTPATIENT
Start: 2018-01-12 | End: 2018-01-13 | Stop reason: HOSPADM

## 2018-01-11 RX ORDER — METHOCARBAMOL 750 MG/1
1500 TABLET, FILM COATED ORAL EVERY 8 HOURS SCHEDULED
Status: DISCONTINUED | OUTPATIENT
Start: 2018-01-11 | End: 2018-01-13 | Stop reason: HOSPADM

## 2018-01-11 RX ADMIN — DOCUSATE SODIUM 100 MG: 100 CAPSULE, LIQUID FILLED ORAL at 20:24

## 2018-01-11 RX ADMIN — METHOCARBAMOL 1000 MG: 500 TABLET ORAL at 16:58

## 2018-01-11 RX ADMIN — ENOXAPARIN SODIUM 40 MG: 40 INJECTION SUBCUTANEOUS at 08:12

## 2018-01-11 RX ADMIN — IBUPROFEN 400 MG: 400 TABLET ORAL at 05:42

## 2018-01-11 RX ADMIN — HYDROCODONE BITARTRATE AND ACETAMINOPHEN 1 TABLET: 5; 325 TABLET ORAL at 08:12

## 2018-01-11 RX ADMIN — METHOCARBAMOL 1000 MG: 500 TABLET ORAL at 05:42

## 2018-01-11 RX ADMIN — AMLODIPINE BESYLATE 5 MG: 5 TABLET ORAL at 08:12

## 2018-01-11 RX ADMIN — IBUPROFEN 400 MG: 400 TABLET ORAL at 12:37

## 2018-01-11 RX ADMIN — HYDROCODONE BITARTRATE AND ACETAMINOPHEN 1 TABLET: 5; 325 TABLET ORAL at 16:57

## 2018-01-11 RX ADMIN — POLYETHYLENE GLYCOL 3350 17 G: 17 POWDER, FOR SOLUTION ORAL at 12:37

## 2018-01-11 RX ADMIN — IBUPROFEN 400 MG: 400 TABLET ORAL at 20:32

## 2018-01-11 RX ADMIN — METHOCARBAMOL 1500 MG: 750 TABLET ORAL at 20:24

## 2018-01-11 RX ADMIN — HYDROCODONE BITARTRATE AND ACETAMINOPHEN 1 TABLET: 5; 325 TABLET ORAL at 03:37

## 2018-01-11 RX ADMIN — LISINOPRIL 20 MG: 20 TABLET ORAL at 08:12

## 2018-01-11 RX ADMIN — VENLAFAXINE HYDROCHLORIDE 75 MG: 75 CAPSULE, EXTENDED RELEASE ORAL at 08:12

## 2018-01-11 RX ADMIN — POLYETHYLENE GLYCOL 3350 17 G: 17 POWDER, FOR SOLUTION ORAL at 20:24

## 2018-01-11 RX ADMIN — ATENOLOL 25 MG: 25 TABLET ORAL at 08:12

## 2018-01-11 RX ADMIN — METHOCARBAMOL 1500 MG: 750 TABLET ORAL at 22:00

## 2018-01-11 NOTE — PLAN OF CARE
Problem: Patient Care Overview (Adult)  Goal: Plan of Care Review  Outcome: Ongoing (interventions implemented as appropriate)   01/10/18 1432 01/11/18 0049   Coping/Psychosocial Response Interventions   Plan Of Care Reviewed With --  patient   Patient Care Overview   Progress progress toward functional goals is gradual --       01/10/18 1432 01/11/18 0049   Coping/Psychosocial Response Interventions   Plan Of Care Reviewed With --  patient   Patient Care Overview   Progress progress toward functional goals is gradual --    Outcome Evaluation   Outcome Summary/Follow up Plan --  patient continues to toilet at bedside commode. able to transfer and pivot with encouragement and minimal assist       Problem: Fall Risk (Adult)  Goal: Identify Related Risk Factors and Signs and Symptoms  Outcome: Ongoing (interventions implemented as appropriate)   01/10/18 1429   Fall Risk   Fall Risk: Related Risk Factors age-related changes;history of falls;gait/mobility problems   Fall Risk: Signs and Symptoms presence of risk factors     Goal: Absence of Falls  Outcome: Ongoing (interventions implemented as appropriate)   01/11/18 0049   Fall Risk (Adult)   Absence of Falls making progress toward outcome       Problem: Pain, Acute (Adult)  Goal: Identify Related Risk Factors and Signs and Symptoms  Outcome: Ongoing (interventions implemented as appropriate)   01/11/18 0049   Pain, Acute   Related Risk Factors (Acute Pain) fear;positioning   Signs and Symptoms (Acute Pain) fatigue/weakness     Goal: Acceptable Pain Control/Comfort Level  Outcome: Ongoing (interventions implemented as appropriate)   01/11/18 0049   Pain, Acute (Adult)   Acceptable Pain Control/Comfort Level making progress toward outcome       Problem: Pressure Ulcer Risk (Ed Scale) (Adult,Obstetrics,Pediatric)  Goal: Identify Related Risk Factors and Signs and Symptoms  Outcome: Ongoing (interventions implemented as appropriate)   01/11/18 0049   Pressure Ulcer  Risk (Ed Scale)   Related Risk Factors (Pressure Ulcer Risk (Ed Scale)) age extremes;mobility impaired     Goal: Skin Integrity  Outcome: Ongoing (interventions implemented as appropriate)   01/11/18 0049   Pressure Ulcer Risk (Ed Scale) (Adult,Obstetrics,Pediatric)   Skin Integrity making progress toward outcome      01/11/18 0049   Pressure Ulcer Risk (Ed Scale) (Adult,Obstetrics,Pediatric)   Skin Integrity making progress toward outcome

## 2018-01-11 NOTE — THERAPY TREATMENT NOTE
"Acute Care - Physical Therapy Treatment Note  [facility]     Patient Name: Stefanie Russell  : 1945  MRN: [id number]  Today's Date: 2018  Onset of Illness/Injury or Date of Surgery Date: 18  Date of Referral to PT: 18  Referring Physician: Shamir CHINO    Admit Date: 2018    Visit Dx:    ICD-10-CM ICD-9-CM   1. Multiple closed fractures of pelvis with stable disruption of pelvic ring, initial encounter S32.810A 808.43   2. Intractable pain R52 780.96   3. Impaired functional mobility, balance, gait, and endurance Z74.09 V49.89     Patient Active Problem List   Diagnosis   • Multiple closed fractures of pelvis with stable disruption of pelvic [place]   • History of pelvic fracture   • Fall at home, initial encounter   • Osteoporosis   • Frequent falls   • Hypokalemia   • Head trauma               Adult Rehabilitation Note       18 1030 01/10/18 1053       Rehab Assessment/Intervention    Discipline physical therapist  -SC physical therapist   seen at 1053 for exercise and 1400 for gait  -SC     Document Type therapy note (daily note)  -SC therapy note (daily note)  -SC     Subjective Information complains of;pain  -SC complains of;pain;weakness;nausea/vomiting   \"ifeel Nauseated\"  -SC     Patient Effort, Rehab Treatment good  -SC good  -SC     Symptoms Noted During/After Treatment increased pain  -SC other (see comments);fatigue;increased pain   nauses during mbulation  -SC     Precautions/Limitations fall precautions  -SC fall precautions  -SC     Recorded by [SC] Emily Ohara, PT [SC] Emily Ohara, PT     Vital Signs    Post SpO2 (%)  96  -SC     O2 Delivery Post Treatment  room air  -SC     Recorded by  [SC] Emily Ohara, PT     Pain Assessment    Pain Assessment Clark-King FACES  -SC Clark-Baker FACES  -SC     Clark-King FACES Pain Rating 0  -SC 0  -SC     Post Pain Score 5  -SC 6  -SC     Pain Type  Acute pain  -SC     Pain Location Pelvis  -SC Pelvis  -SC     Pain " Orientation Right  -SC Right  -SC     Pain Intervention(s) Repositioned  -SC Repositioned  -SC     Response to Interventions improved  -SC improved  -SC     Recorded by [SC] Emily Ohara, PT [SC] Emily Ohara, PT     Cognitive Assessment/Intervention    Current Cognitive/Communication Assessment functional  -SC functional  -SC     Orientation Status oriented x 4  -SC oriented x 4  -SC     Follows Commands/Answers Questions 100% of the time  -% of the time  -SC     Personal Safety WNL/WFL  -SC WNL/WFL  -SC     Personal Safety Interventions fall prevention program maintained;gait belt  -SC gait belt;fall prevention program maintained  -SC     Recorded by [SC] Emily Ohara, PT [SC] Emily Ohara, PT     Mobility Assessment/Training    Extremity Weight-Bearing Status right lower extremity  -SC right lower extremity  -SC     Right Upper Extremity Weight-Bearing weight-bearing as tolerated  -SC weight-bearing as tolerated  -SC     Recorded by [SC] Emily Ohara, PT [SC] Emily Ohara, PT     Bed Mobility, Assessment/Treatment    Bed Mobility, Assistive Device  bed rails;head of bed elevated;draw sheet  -SC     Bed Mob, Supine to Sit, [place]  moderate assist (50% patient effort);verbal cues required  -SC     Bed Mobility, Impairments  pain;motor control impaired;impaired balance;strength decreased  -SC     Bed Mobility, Comment uic  -SC up to edge of bed with cueing. Needs help with legs and trunk  -SC     Recorded by [SC] Emily Ohara, PT [SC] Emily Ohara, PT     Transfer Assessment/Treatment    Transfers, Sit-Stand [place] supervision required;verbal cues required  -SC verbal cues required;contact guard assist  -SC     Transfers, Stand-Sit [place] supervision required;verbal cues required  -SC verbal cues required;contact guard assist  -SC     Transfers, Sit-Stand-Sit, Assist Device rolling walker  -SC rolling walker  -SC     Transfer, Impairments pain;motor control impaired  -SC  strength decreased;pain;motor control impaired  -SC     Transfer, Comment demonstrates  good technique with cueing  -SC repeted cues for safty and to reach for  cair rails  -SC     Recorded by [SC] Emily Ohara PT [SC] Emily Ohara PT     Gait Assessment/Treatment    Gait, [place] Level minimum assist (75% patient effort)  -SC minimum assist (75% patient effort)  -SC     Gait, Assistive Device rolling walker  -SC rolling walker  -SC     Gait, Distance (Feet) 7  -SC 6  -SC     Gait, Gait Pattern Analysis swing-to gait  -SC swing-to gait  -SC     Gait, Gait Deviations right:;antalgic;stephanie decreased;weight-shifting ability decreased  -SC left:;antalgic;weight-shifting ability decreased  -SC     Gait, Safety Issues  balance decreased during turns  -SC     Gait, Impairments pain;motor control impaired  -SC pain;motor control impaired;strength decreased  -SC     Gait, Comment repeted cues needed for sequencing. Initially needed assist to advance r leg. Walker slightly tall for patient. I have called casemanager about this  -SC cues for sequencing walker. Occational assist to advance R leg  and wt shift.   -SC     Recorded by [SC] Emily Ohara, PT [SC] Emily Ohara PT     Therapy Exercises    Bilateral Lower Extremities AROM:;15 reps;sitting;ankle pumps/circles;glut sets;quad sets;LAQ  -SC 15 reps;AROM:;supine;ankle pumps/circles;glut sets;quad sets;heel slides;hip abduction/adduction  -SC     Recorded by [SC] Emily Ohara, PT [SC] Emily Ohara PT     Positioning and Restraints    Pre-Treatment Position sitting in chair/recliner  -SC in bed  -SC     Post Treatment Position chair  -SC chair  -SC     In Bed notified nsg;sitting;sitting EOB;call light within reach;encouraged to call for assist;exit alarm on  -SC sitting;call light within reach;encouraged to call for assist;exit alarm on;with family/caregiver  -SC     Recorded by [SC] Emily Ohara PT [SC] Emily Ohara PT       User Day  (r) =  Recorded By, (t) = Taken By, (c) = Cosigned By    Initials Name Effective Dates    SC Emily A Mayda, PT 06/19/15 -                 IP PT Goals       01/11/18 1321 01/09/18 1704       Bed Mobility PT LTG    Bed Mobility PT LTG, Date Established  01/09/18  -SC     Bed Mobility PT LTG, Time to Achieve  5 - 7 days  -SC     Bed Mobility PT LTG, Activity Type  supine to sit/sit to supine;sidelying to sit/sit to sidelying  -SC     Bed Mobility PT LTG, [place] Level  conditional independence  -SC     Bed Mobility PT Goal  LTG, Assist Device  bed rails  -SC     Bed Mobility PT LTG, Outcome  goal ongoing  -SC     Transfer Training PT LTG    Transfer Training PT LTG, Date Established  01/09/18  -SC     Transfer Training PT LTG, Time to Achieve  5 - 7 days  -SC     Transfer Training PT LTG, Activity Type  sit to stand/stand to sit  -SC     Transfer Training PT LTG, [place] Level  contact guard assist  -SC     Transfer Training PT LTG, Assist Device  walker, rolling  -SC     Transfer Training PT LTG, Outcome goal met  -SC goal ongoing  -SC     Gait Training PT LTG    Gait Training Goal PT LTG, Date Established  01/09/18  -SC     Gait Training Goal PT LTG, Time to Achieve  5 - 7 days  -SC     Gait Training Goal PT LTG, [place] Level  contact guard assist  -SC     Gait Training Goal PT LTG, Assist Device  walker, rolling  -SC     Gait Training Goal PT LTG, Distance to Achieve  75  -SC     Gait Training Goal PT LTG, Outcome  goal ongoing  -SC       User Key  (r) = Recorded By, (t) = Taken By, (c) = Cosigned By    Initials Name Provider Type    SC Emily JOHNSON Mayda, PT Physical Therapist          Physical Therapy Education     Title: PT OT SLP Therapies (Done)     Topic: Physical Therapy (Done)     Point: Mobility training (Done)    Learning Progress Summary    Learner Readiness Method Response Comment Documented by Status   Patient Eager E SHELLY CALDERON reviewed safety with mobility SC 01/11/18 1320 Done    Acceptance E SHELLY  AH  01/11/18 0106 Done    YUMIKO Carrillo NR reviewed benefits of activity SC 01/10/18 1431 Done    TEN Godinez DU, NR reviewed benefits of activity SC 01/09/18 1704 Done               Point: Home exercise program (Done)    Learning Progress Summary    Learner Readiness Method Response Comment Documented by Status   Patient Lily THOMAS SHELLY CALDERON reviewed safety with mobility SC 01/11/18 1320 Done    Acceptance E VU   01/11/18 0106 Done    YUMIKO Carrillo,NR reviewed benefits of activity SC 01/10/18 1431 Done    TEN Godinez DU,NR reviewed benefits of activity SC 01/09/18 1704 Done               Point: Body mechanics (Done)    Learning Progress Summary    Learner Readiness Method Response Comment Documented by Status   Patient Lily THOMAS SHELLY CALDERON reviewed safety with mobility SC 01/11/18 1320 Done    Acceptance E VU   01/11/18 0106 Done    YUMIKO Carrillo,ANNEMARIE reviewed benefits of activity SC 01/10/18 1431 Done    TEN Godinez DU, NR reviewed benefits of activity SC 01/09/18 1704 Done               Point: Precautions (Done)    Learning Progress Summary    Learner Readiness Method Response Comment Documented by Status   Patient Lily THOMAS SHELLY CALDERON reviewed safety with mobility SC 01/11/18 1320 Done    Acceptance E VU   01/11/18 0106 Done    YUMIKO Carrillo NR reviewed benefits of activity SC 01/10/18 1431 Done    TEN Godinez DU, NR reviewed benefits of activity SC 01/09/18 1704 Done                      User Key     Initials Effective Dates Name Provider Type Discipline    SC 06/19/15 -  Emily Ohara, PT Physical Therapist PT     09/29/17 -  Wendy Mccullough, RN Registered Nurse Nurse                    PT Recommendation and Plan  Anticipated Equipment Needs At Discharge: front wheeled walker, bedside commode  Anticipated Discharge Disposition: home with home health  Planned Therapy Interventions: gait training, bed mobility training, balance training, patient/family education, strengthening, transfer training  PT Frequency: daily  Plan of  Care Review  Plan Of Care Reviewed With: patient  Progress: improving  Outcome Summary/Follow up Plan: Showing improvement  in upright mobility. Walking 7 feet with PT. Limited by pain.          Outcome Measures       01/11/18 1030 01/10/18 1053 01/09/18 1500    How much help from another person do you currently need...    Turning from your back to your side while in flat bed without using bedrails? 2  -SC 2  -SC 2  -SC    Moving from lying on back to sitting on the side of a flat bed without bedrails? 2  -SC 2  -SC 2  -SC    Moving to and from a bed to a chair (including a wheelchair)? 2  -SC 2  -SC 2  -SC    Standing up from a chair using your arms (e.g., wheelchair, bedside chair)? 3  -SC 2  -SC 2  -SC    Climbing 3-5 steps with a railing? 1  -SC 1  -SC 1  -SC    To walk in hospital room? 3  -SC 2  -SC 2  -SC    AM-PAC 6 Clicks Score 13  -SC 11  -SC 11  -SC    Functional Assessment    Outcome Measure Options AM-PAC 6 Clicks Basic Mobility (PT)  -SC AM-PAC 6 Clicks Basic Mobility (PT)  -SC AM-PAC 6 Clicks Basic Mobility (PT)  -SC      User Key  (r) = Recorded By, (t) = Taken By, (c) = Cosigned By    Initials Name Provider Type    SC Emily Ohara, PT Physical Therapist           Time Calculation:         PT Charges       01/11/18 1323          Time Calculation    Start Time 1030  -SC      PT Received On 01/11/18  -SC      PT Goal Re-Cert Due Date 01/19/18  -SC      Time Calculation- PT    Total Timed Code Minutes- PT 25 minute(s)  -SC        User Key  (r) = Recorded By, (t) = Taken By, (c) = Cosigned By    Initials Name Provider Type    SC Emily Ohara, PT Physical Therapist          Therapy Charges for Today     Code Description Service Date Service Provider Modifiers Qty    73787062470 HC PT THER PROC EA 15 MIN 1/10/2018 Emily Ohara, PT GP 1    79707912093 HC GAIT TRAINING EA 15 MIN 1/10/2018 Emily Ohara, PT GP 1    50452001275 HC GAIT TRAINING EA 15 MIN 1/11/2018 Emily Ohara, PT GP 1    [id number]  HC PT THER PROC EA 15 MIN 1/11/2018 Emily Ohara, PT GP 1          PT G-Codes  Outcome Measure Options: AM-PAC 6 Clicks Basic Mobility (PT)    Emily Ohara, PT  1/11/2018

## 2018-01-11 NOTE — PLAN OF CARE
Problem: Orthopaedic Fracture (Adult)  Goal: Signs and Symptoms of Listed Potential Problems Will be Absent or Manageable (Orthopaedic Fracture)  Outcome: Ongoing (interventions implemented as appropriate)

## 2018-01-11 NOTE — PLAN OF CARE
Problem: Patient Care Overview (Adult)  Goal: Plan of Care Review  Outcome: Ongoing (interventions implemented as appropriate)   01/11/18 1321   Coping/Psychosocial Response Interventions   Plan Of Care Reviewed With patient   Patient Care Overview   Progress improving   Outcome Evaluation   Outcome Summary/Follow up Plan Showing improvement in upright mobility. Walking 7 feet with PT. Limited by pain.       Problem: Inpatient Physical Therapy  Goal: Bed Mobility Goal LTG- PT  Outcome: Ongoing (interventions implemented as appropriate)   01/09/18 1704   Bed Mobility PT LTG   Bed Mobility PT LTG, Date Established 01/09/18   Bed Mobility PT LTG, Time to Achieve 5 - 7 days   Bed Mobility PT LTG, Activity Type supine to sit/sit to supine;sidelying to sit/sit to sidelying   Bed Mobility PT LTG, [place] Level conditional independence   Bed Mobility PT Goal LTG, Assist Device bed rails   Bed Mobility PT LTG, Outcome goal ongoing     Goal: Transfer Training Goal 1 LTG- PT  Outcome: Outcome(s) achieved Date Met: 01/11/18 01/09/18 1704 01/11/18 1321   Transfer Training PT LTG   Transfer Training PT LTG, Date Established 01/09/18 --    Transfer Training PT LTG, Time to Achieve 5 - 7 days --    Transfer Training PT LTG, Activity Type sit to stand/stand to sit --    Transfer Training PT LTG, [place] Level contact guard assist --    Transfer Training PT LTG, Assist Device walker, rolling --    Transfer Training PT LTG, Outcome --  goal met     Goal: Gait Training Goal LTG- PT   01/09/18 1704   Gait Training PT LTG   Gait Training Goal PT LTG, Date Established 01/09/18   Gait Training Goal PT LTG, Time to Achieve 5 - 7 days   Gait Training Goal PT LTG, [place] Level contact guard assist   Gait Training Goal PT LTG, Assist Device walker, rolling   Gait Training Goal PT LTG, Distance to Achieve 75   Gait Training Goal PT LTG, Outcome goal ongoing

## 2018-01-11 NOTE — PROGRESS NOTES
[facility] Medicine Services  PROGRESS NOTE    Patient Name: Stefanie Russell  : 1945  MRN: [id number]    Date of Admission: 2018  Length of Stay: 2  Primary Care Physician: Eric Brunson DO    Subjective   Subjective     CC: pelvic pain    HPI: asking to increase bowel regimen.  Asking to increase muscle relaxant.  Walked some today.   Planning on rehab now.  No family today.    Review of Systems  Gen- No fevers, chills  CV- No chest pain, palpitations  Resp- No cough, dyspnea  GI- No N/V/D, abd pain    Otherwise ROS is negative except as mentioned in the HPI.    Objective   Objective     Vital Signs:   Temp:  [97.9 °F (36.6 °C)-98.6 °F (37 °C)] 97.9 °F (36.6 °C)  Heart Rate:  [77] 77  Resp:  [18] 18  BP: (110-130)/(52-71) 112/52        Physical Exam:  Constitutional: No acute distress, awake, alert  HENT: NCAT, mucous membranes moist  Respiratory: Clear to auscultation bilaterally, respiratory effort normal   Cardiovascular: RRR, no murmurs, rubs, or gallops, palpable pedal pulses bilaterally  Gastrointestinal: Positive bowel sounds, soft, nontender, nondistended  Musculoskeletal: No bilateral ankle edema  Psychiatric: Appropriate affect, cooperative  Neurologic: Oriented x 3, strength symmetric in all extremities, Cranial Nerves grossly intact to confrontation, speech clear  Skin: No rashes    Results Reviewed:  I have personally reviewed current lab, radiology, and data and agree.      Results from last 7 days  Lab Units 18   WBC 10*3/mm3 10.48   HEMOGLOBIN g/dL 12.5   HEMATOCRIT % 36.5   PLATELETS 10*3/mm3 227       Results from last 7 days  Lab Units 01/10/18  0628 18  0824 18  1939   SODIUM mmol/L 134  --  136   POTASSIUM mmol/L 4.2 4.0 3.2*   CHLORIDE mmol/L 102  --  103   CO2 mmol/L 26.0  --  24.0   BUN mg/dL 18  --  19   CREATININE mg/dL 0.70  --  0.90   GLUCOSE mg/dL 107*  --  113*   CALCIUM mg/dL 8.2*  --  8.6*     No results found for:  BNP  No results found for: PHART    Microbiology Results Abnormal     None        CT pelvis reviewed with right sided fracture noted. Agree with interpretation.    Imaging Results (last 24 hours)     ** No results found for the last 24 hours. **        I have reviewed the medications.    Assessment/Plan   Assessment / Plan     Hospital Problem List     * (Principal)Multiple closed fractures of pelvis with stable disruption of pelvic [place]    History of pelvic fracture    Fall at home, initial encounter    Osteoporosis    Frequent falls    Hypokalemia    Head trauma             Brief Hospital Course to date:  Stefanie Russell is a 73 y.o. female who presented from home after multiple falls found to have right pelvic fracture.    Assessment & Plan:  --Awaiting ortho confirmation but fracture likely to be managed non-operatively. Continue pain control and await PT eval.  --Has had history of multiple recent falls, all mechanical in nature per patient and daughter.  --de-escalate pain medication to try to get home on oral meds  --restart Effexor yesterday  --continue IV fluids  --increase skeletal muscle relaxant  --increase bowel regimen today by adding colace BID  --decrease norvasc today.  May be able to come off as not a home med    DVT Prophylaxis:  Lovenox    CODE STATUS: Full Code    Disposition: I expect the patient to be discharged to [place] [facility] [place]    Tree Amador MD  01/11/18  6:34 PM

## 2018-01-11 NOTE — PROGRESS NOTES
Continued Stay Note  [facility]     Patient Name: Stefanie Russell  MRN: [id number]  Today's Date: 1/11/2018    Admit Date: 1/8/2018          Discharge Plan       01/11/18 1013    Case Management/Social Work Plan    Plan To the [facility] at discharge pending     Patient/Family In Agreement With Plan yes    Additional Comments Spoke with patient and daughter at bedside - Patient wanted to discuss rehab and asked for referral be made to [facility] - Referral called to Alfreda who thinks she can accept the patient and will probably have a bed ready by tomorrow - Alfreda has to review patients chart and will call me back by end ot the day to verify that the patient can officailly be accepted but likely plan is for transfer tomorrow if medically ready and daughter can transport - Daughter (Shavonne [phone]) - CM following - aw [phone]               Discharge Codes     None            Stephie Jacobs RN

## 2018-01-12 LAB
ANION GAP SERPL CALCULATED.3IONS-SCNC: 3 MMOL/L (ref 3–11)
BUN BLD-MCNC: 18 MG/DL (ref 9–23)
BUN/CREAT SERPL: 30 (ref 7–25)
CALCIUM SPEC-SCNC: 8.3 MG/DL (ref 8.7–10.4)
CHLORIDE SERPL-SCNC: 106 MMOL/L (ref 99–109)
CO2 SERPL-SCNC: 26 MMOL/L (ref 20–31)
CREAT BLD-MCNC: 0.6 MG/DL (ref 0.6–1.3)
DEPRECATED RDW RBC AUTO: 50.3 FL (ref 37–54)
ERYTHROCYTE [DISTWIDTH] IN BLOOD BY AUTOMATED COUNT: 14.7 % (ref 11.3–14.5)
GFR SERPL CREATININE-BSD FRML MDRD: 99 ML/MIN/1.73
GLUCOSE BLD-MCNC: 85 MG/DL (ref 70–100)
HCT VFR BLD AUTO: 37.9 % (ref 34.5–44)
HGB BLD-MCNC: 12.7 G/DL (ref 11.5–15.5)
MCH RBC QN AUTO: 31.4 PG (ref 27–31)
MCHC RBC AUTO-ENTMCNC: 33.5 G/DL (ref 32–36)
MCV RBC AUTO: 93.8 FL (ref 80–99)
PLATELET # BLD AUTO: 242 10*3/MM3 (ref 150–450)
PMV BLD AUTO: 10.1 FL (ref 6–12)
POTASSIUM BLD-SCNC: 4.1 MMOL/L (ref 3.5–5.5)
RBC # BLD AUTO: 4.04 10*6/MM3 (ref 3.89–5.14)
SODIUM BLD-SCNC: 135 MMOL/L (ref 132–146)
WBC NRBC COR # BLD: 7.49 10*3/MM3 (ref 3.5–10.8)

## 2018-01-12 PROCEDURE — 25010000002 ENOXAPARIN PER 10 MG: Performed by: NURSE PRACTITIONER

## 2018-01-12 PROCEDURE — 97116 GAIT TRAINING THERAPY: CPT

## 2018-01-12 PROCEDURE — 99233 SBSQ HOSP IP/OBS HIGH 50: CPT | Performed by: HOSPITALIST

## 2018-01-12 PROCEDURE — 80048 BASIC METABOLIC PNL TOTAL CA: CPT | Performed by: HOSPITALIST

## 2018-01-12 PROCEDURE — 85027 COMPLETE CBC AUTOMATED: CPT | Performed by: HOSPITALIST

## 2018-01-12 RX ORDER — AMITRIPTYLINE HYDROCHLORIDE 25 MG/1
25 TABLET, FILM COATED ORAL NIGHTLY
Status: DISCONTINUED | OUTPATIENT
Start: 2018-01-12 | End: 2018-01-13 | Stop reason: HOSPADM

## 2018-01-12 RX ORDER — LACTULOSE 10 G/15ML
20 SOLUTION ORAL ONCE
Status: COMPLETED | OUTPATIENT
Start: 2018-01-12 | End: 2018-01-12

## 2018-01-12 RX ADMIN — METHOCARBAMOL 1500 MG: 750 TABLET ORAL at 06:49

## 2018-01-12 RX ADMIN — METHOCARBAMOL 1500 MG: 750 TABLET ORAL at 14:32

## 2018-01-12 RX ADMIN — HYDROCODONE BITARTRATE AND ACETAMINOPHEN 1 TABLET: 5; 325 TABLET ORAL at 21:41

## 2018-01-12 RX ADMIN — POLYETHYLENE GLYCOL 3350 17 G: 17 POWDER, FOR SOLUTION ORAL at 08:31

## 2018-01-12 RX ADMIN — HYDROCODONE BITARTRATE AND ACETAMINOPHEN 1 TABLET: 5; 325 TABLET ORAL at 06:49

## 2018-01-12 RX ADMIN — LISINOPRIL 20 MG: 20 TABLET ORAL at 08:31

## 2018-01-12 RX ADMIN — METHOCARBAMOL 1500 MG: 750 TABLET ORAL at 21:44

## 2018-01-12 RX ADMIN — ATENOLOL 25 MG: 25 TABLET ORAL at 08:31

## 2018-01-12 RX ADMIN — AMITRIPTYLINE HYDROCHLORIDE 25 MG: 25 TABLET, FILM COATED ORAL at 21:41

## 2018-01-12 RX ADMIN — HYDROCODONE BITARTRATE AND ACETAMINOPHEN 1 TABLET: 5; 325 TABLET ORAL at 00:57

## 2018-01-12 RX ADMIN — AMLODIPINE BESYLATE 2.5 MG: 2.5 TABLET ORAL at 08:31

## 2018-01-12 RX ADMIN — DOCUSATE SODIUM 100 MG: 100 CAPSULE, LIQUID FILLED ORAL at 08:31

## 2018-01-12 RX ADMIN — LACTULOSE 20 G: 10 SOLUTION ORAL at 12:41

## 2018-01-12 RX ADMIN — VENLAFAXINE HYDROCHLORIDE 75 MG: 75 CAPSULE, EXTENDED RELEASE ORAL at 08:31

## 2018-01-12 RX ADMIN — ENOXAPARIN SODIUM 40 MG: 40 INJECTION SUBCUTANEOUS at 08:31

## 2018-01-12 NOTE — THERAPY TREATMENT NOTE
"Acute Care - Physical Therapy Treatment Note  [facility]     Patient Name: Stefanie Russell  : 1945  MRN: [id number]  Today's Date: 2018  Onset of Illness/Injury or Date of Surgery Date: 18  Date of Referral to PT: 18  Referring Physician: Shamir CHINO    Admit Date: 2018    Visit Dx:    ICD-10-CM ICD-9-CM   1. Multiple closed fractures of pelvis with stable disruption of pelvic ring, initial encounter S32.810A 808.43   2. Intractable pain R52 780.96   3. Impaired functional mobility, balance, gait, and endurance Z74.09 V49.89     Patient Active Problem List   Diagnosis   • Multiple closed fractures of pelvis with stable disruption of pelvic [place]   • History of pelvic fracture   • Fall at home, initial encounter   • Osteoporosis   • Frequent falls   • Hypokalemia   • Head trauma               Adult Rehabilitation Note       18 1430 18 1030 01/10/18 1053    Rehab Assessment/Intervention    Discipline physical therapist  -SC physical therapist  -SC physical therapist   seen at 1053 for exercise and 1400 for gait  -SC    Document Type therapy note (daily note)  -SC therapy note (daily note)  -SC therapy note (daily note)  -SC    Subjective Information complains of;pain;nausea/vomiting  -SC complains of;pain  -SC complains of;pain;weakness;nausea/vomiting   \"ifeel Nauseated\"  -SC    Patient Effort, Rehab Treatment good  -SC good  -SC good  -SC    Symptoms Noted During/After Treatment increased pain  -SC increased pain  -SC other (see comments);fatigue;increased pain   nauses during mbulation  -SC    Symptoms Noted Comment abdominal cramping  -SC      Precautions/Limitations fall precautions  -SC fall precautions  -SC fall precautions  -SC    Recorded by [SC] Emily Ohara PT [SC] Emily Ohara PT [SC] Emily Ohara PT    Vital Signs    Post SpO2 (%)   96  -SC    O2 Delivery Post Treatment   room air  -SC    Recorded by   [SC] Emily Ohara PT    Pain Assessment    Pain " Assessment Clark-King FACES  -SC Clark-Baker FACES  -SC Clark-Baker FACES  -SC    Clark-King FACES Pain Rating 6  -SC 0  -SC 0  -SC    Pain Score 6  -SC      Post Pain Score 7  -SC 5  -SC 6  -SC    Pain Type   Acute pain  -SC    Pain Location Pelvis  -SC Pelvis  -SC Pelvis  -SC    Pain Orientation Right  -SC Right  -SC Right  -SC    Pain Intervention(s) Repositioned  -SC Repositioned  -SC Repositioned  -SC    Response to Interventions  improved  -SC improved  -SC    Recorded by [SC] Shearon A Mayda, PT [SC] Shearon A Mayda, PT [SC] Shearon A Mayda, PT    Pain 2    Clark-Baker FACES Pain Rating 2 0  -SC      Post Tx Pain Score 2 5  -SC      Pain Type 2 Acute pain  -SC      Pain Location 2 Abdomen  -SC      Pain Intervention(s) 2 --   patient up on [facility] for BM  -SC      Recorded by [SC] Shearon A Mayda, PT      Cognitive Assessment/Intervention    Current Cognitive/Communication Assessment functional  -SC functional  -SC functional  -SC    Orientation Status oriented x 4  -SC oriented x 4  -SC oriented x 4  -SC    Follows Commands/Answers Questions 100% of the time  -% of the time  -% of the time  -SC    Personal Safety WNL/WFL  -SC WNL/WFL  -SC WNL/WFL  -SC    Personal Safety Interventions gait belt;fall prevention program maintained  -SC fall prevention program maintained;gait belt  -SC gait belt;fall prevention program maintained  -SC    Recorded by [SC] Shearon A Mayda, PT [SC] Shearon A Mayda, PT [SC] Shearon A Mayda, PT    Mobility Assessment/Training    Extremity Weight-Bearing Status right upper extremity  -SC right lower extremity  -SC right lower extremity  -SC    Right Upper Extremity Weight-Bearing weight-bearing as tolerated  -SC weight-bearing as tolerated  -SC weight-bearing as tolerated  -SC    Recorded by [SC] Shearon A Mayda, PT [SC] Shearon A Mayda, PT [SC] Shearon A Mayda, PT    Bed Mobility, Assessment/Treatment    Bed Mobility, Assistive Device bed rails;head of bed elevated  -SC  bed rails;head of  bed elevated;draw sheet  -SC    Bed Mob, Supine to Sit, [place]cher minimum assist (75% patient effort);verbal cues required  -SC  moderate assist (50% patient effort);verbal cues required  -SC    Bed Mobility, Impairments pain;motor control impaired  -SC  pain;motor control impaired;impaired balance;strength decreased  -SC    Bed Mobility, Comment  uic  -SC up to edge of bed with cueing. Needs help with legs and trunk  -SC    Recorded by [SC] Emily Ohara, PT [SC] Emily JOHNSON Mayda, PT [SC] Emily A Mayda, PT    Transfer Assessment/Treatment    Transfers, Sit-Stand Letcher supervision required  -SC supervision required;verbal cues required  -SC verbal cues required;contact guard assist  -SC    Transfers, Stand-Sit [place]cher supervision required;verbal cues required  -SC supervision required;verbal cues required  -SC verbal cues required;contact guard assist  -SC    Transfers, Sit-Stand-Sit, Assist Device rolling walker  -SC rolling walker  -SC rolling walker  -SC    Transfer, Impairments pain;motor control impaired  -SC pain;motor control impaired  -SC strength decreased;pain;motor control impaired  -SC    Transfer, Comment demonstrating good technique  -SC demonstrates  good technique with cueing  -SC repeted cues for safty and to reach for  cair rails  -SC    Recorded by [SC] Emily A Mayda, PT [SC] Emliy A Mayda, PT [SC] Emily A Mayda, PT    Gait Assessment/Treatment    Gait, Letcher Level contact guard assist  -SC minimum assist (75% patient effort)  -SC minimum assist (75% patient effort)  -SC    Gait, Assistive Device rolling walker  -SC rolling walker  -SC rolling walker  -SC    Gait, Distance (Feet) 7  -SC 7  -SC 6  -SC    Gait, Gait Pattern Analysis swing-to gait  -SC swing-to gait  -SC swing-to gait  -SC    Gait, Gait Deviations right:;antalgic;weight-shifting ability decreased  -SC right:;antalgic;stephanie decreased;weight-shifting ability decreased  -SC left:;antalgic;weight-shifting  ability decreased  -SC    Gait, Safety Issues   balance decreased during turns  -SC    Gait, Impairments pain  -SC pain;motor control impaired  -SC pain;motor control impaired;strength decreased  -SC    Gait, Comment able to advance leg independently. Limited by c/o nausea and abdominal pain.   -SC repeted cues needed for sequencing. Initially needed assist to advance r leg. Walker slightly tall for patient. I have called casemanager about this  -SC cues for sequencing walker. Occational assist to advance R leg  and wt shift.   -SC    Recorded by [SC] Emily A Mayda, PT [SC] Shearon A Mayda, PT [SC] Shearon A Mayda, PT    Therapy Exercises    Bilateral Lower Extremities  AROM:;15 reps;sitting;ankle pumps/circles;glut sets;quad sets;LAQ  -SC 15 reps;AROM:;supine;ankle pumps/circles;glut sets;quad sets;heel slides;hip abduction/adduction  -SC    Recorded by  [SC] Emily A Mayda, PT [SC] Denveron A Mayda, PT    Positioning and Restraints    Pre-Treatment Position in bed  -SC sitting in chair/recliner  -SC in bed  -SC    Post Treatment Position bsc  -SC chair  -SC chair  -SC    In Bed sitting;call light within reach;encouraged to call for assist;notified nsg  -SC notified nsg;sitting;sitting EOB;call light within reach;encouraged to call for assist;exit alarm on  -SC sitting;call light within reach;encouraged to call for assist;exit alarm on;with family/caregiver  -SC    Recorded by [SC] Emily A Mayda, PT [SC] Denveron A Mayda, PT [SC] Denveron A Mayda, PT      User Key  (r) = Recorded By, (t) = Taken By, (c) = Cosigned By    Initials Name Effective Dates    SC Emily Ohara, PT 06/19/15 -                 IP PT Goals       01/11/18 1321 01/09/18 1704       Bed Mobility PT LTG    Bed Mobility PT LTG, Date Established  01/09/18  -SC     Bed Mobility PT LTG, Time to Achieve  5 - 7 days  -SC     Bed Mobility PT LTG, Activity Type  supine to sit/sit to supine;sidelying to sit/sit to sidelying  -SC     Bed Mobility PT LTG,  Pollock Level  conditional independence  -SC     Bed Mobility PT Goal  LTG, Assist Device  bed rails  -SC     Bed Mobility PT LTG, Outcome  goal ongoing  -SC     Transfer Training PT LTG    Transfer Training PT LTG, Date Established  01/09/18  -SC     Transfer Training PT LTG, Time to Achieve  5 - 7 days  -SC     Transfer Training PT LTG, Activity Type  sit to stand/stand to sit  -SC     Transfer Training PT LTG, Pollock Level  contact guard assist  -SC     Transfer Training PT LTG, Assist Device  walker, rolling  -SC     Transfer Training PT LTG, Outcome goal met  -SC goal ongoing  -SC     Gait Training PT LTG    Gait Training Goal PT LTG, Date Established  01/09/18  -SC     Gait Training Goal PT LTG, Time to Achieve  5 - 7 days  -SC     Gait Training Goal PT LTG, Pollock Level  contact guard assist  -SC     Gait Training Goal PT LTG, Assist Device  walker, rolling  -SC     Gait Training Goal PT LTG, Distance to Achieve  75  -SC     Gait Training Goal PT LTG, Outcome  goal ongoing  -SC       User Key  (r) = Recorded By, (t) = Taken By, (c) = Cosigned By    Initials Name Provider Type    ELVIA Ohara, PT Physical Therapist          Physical Therapy Education     Title: PT OT SLP Therapies (Done)     Topic: Physical Therapy (Done)     Point: Mobility training (Done)    Learning Progress Summary    Learner Readiness Method Response Comment Documented by Status   Patient SHELLY Harrison reviewwed benefits of activity SC 01/12/18 1714 Done    SHELLY Harrison reviewed safety with mobility SC 01/11/18 1320 Done    Ron BRAVO   01/11/18 0106 Done    YUMIKO Carrillo,ANNEMARIE reviewed benefits of activity SC 01/10/18 1431 Done    TEN Godinez DU, NR reviewed benefits of activity SC 01/09/18 1704 Done               Point: Home exercise program (Done)    Learning Progress Summary    Learner Readiness Method Response Comment Documented by Status   Patient SHELLY Harrison reviewwed benefits of activity SC 01/12/18  1714 Done    SHELLY Harrison reviewed safety with mobility SC 01/11/18 1320 Done    Acceptance E VU   01/11/18 0106 Done    YUMIKO Carrillo,ANNEMARIE reviewed benefits of activity SC 01/10/18 1431 Done    TEN Godinez DU, NR reviewed benefits of activity SC 01/09/18 1704 Done               Point: Body mechanics (Done)    Learning Progress Summary    Learner Readiness Method Response Comment Documented by Status   Patient SHELLY Harrison reviewwed benefits of activity SC 01/12/18 1714 Done    SHELLY Harrison reviewed safety with mobility SC 01/11/18 1320 Done    Acceptance E VU   01/11/18 0106 Done    YUMIKO Carrillo,NR reviewed benefits of activity SC 01/10/18 1431 Done    TEN Godinez DU, NR reviewed benefits of activity SC 01/09/18 1704 Done               Point: Precautions (Done)    Learning Progress Summary    Learner Readiness Method Response Comment Documented by Status   Patient SHELLY Harrison reviewwed benefits of activity SC 01/12/18 1714 Done    SHELLY Harrison reviewed safety with mobility SC 01/11/18 1320 Done    Acceptance E VU   01/11/18 0106 Done    YUMIKO Carrillo,ANNEMARIE reviewed benefits of activity SC 01/10/18 1431 Done    TEN Godinez DU, NR reviewed benefits of activity SC 01/09/18 1704 Done                      User Key     Initials Effective Dates Name Provider Type Discipline    SC 06/19/15 -  Emily Ohara, PT Physical Therapist PT     09/29/17 -  Wendy Mccullough RN Registered Nurse Nurse                    PT Recommendation and Plan  Anticipated Equipment Needs At Discharge: front wheeled walker, bedside commode  Anticipated Discharge Disposition: home with home health  Planned Therapy Interventions: gait training, bed mobility training, balance training, patient/family education, strengthening, transfer training  PT Frequency: daily  Plan of Care Review  Plan Of Care Reviewed With: patient  Progress: improving  Outcome Summary/Follow up Plan: Able to advance legs independently. Less pain with walking.            Outcome Measures       01/12/18 1430 01/11/18 1030 01/10/18 1053    How much help from another person do you currently need...    Turning from your back to your side while in flat bed without using bedrails? 3  -SC 2  -SC 2  -SC    Moving from lying on back to sitting on the side of a flat bed without bedrails? 3  -SC 2  -SC 2  -SC    Moving to and from a bed to a chair (including a wheelchair)? 3  -SC 2  -SC 2  -SC    Standing up from a chair using your arms (e.g., wheelchair, bedside chair)? 3  -SC 3  -SC 2  -SC    Climbing 3-5 steps with a railing? 1  -SC 1  -SC 1  -SC    To walk in hospital room? 3  -SC 3  -SC 2  -SC    AM-PAC 6 Clicks Score 16  -SC 13  -SC 11  -SC    Functional Assessment    Outcome Measure Options AM-PAC 6 Clicks Basic Mobility (PT)  -SC AM-PAC 6 Clicks Basic Mobility (PT)  -SC AM-PAC 6 Clicks Basic Mobility (PT)  -SC      User Key  (r) = Recorded By, (t) = Taken By, (c) = Cosigned By    Initials Name Provider Type    SC Emily Ohara, PT Physical Therapist           Time Calculation:         PT Charges       01/12/18 1719          Time Calculation    Start Time 1430  -SC      PT Received On 01/12/18  -SC      PT Goal Re-Cert Due Date 01/19/18  -SC      Time Calculation- PT    Total Timed Code Minutes- PT 20 minute(s)  -SC        User Key  (r) = Recorded By, (t) = Taken By, (c) = Cosigned By    Initials Name Provider Type    SC Emily Ohara, PT Physical Therapist          Therapy Charges for Today     Code Description Service Date Service Provider Modifiers Qty    95312890327 HC GAIT TRAINING EA 15 MIN 1/11/2018 Emily Ohara, PT GP 1    99162052419 HC PT THER PROC EA 15 MIN 1/11/2018 Emily Ohara, PT GP 1    81833275653 HC GAIT TRAINING EA 15 MIN 1/12/2018 Emily Ohara, PT GP 1          PT G-Codes  Outcome Measure Options: AM-PAC 6 Clicks Basic Mobility (PT)    Emily Ohara, PT  1/12/2018

## 2018-01-12 NOTE — PROGRESS NOTES
Continued Stay Note  [facility]     Patient Name: Stefanie Russell  MRN: [id number]  Today's Date: 1/12/2018    Admit Date: 1/8/2018          Discharge Plan       01/12/18 1329    Case Management/Social Work Plan    Plan Bed for Saturday at the [facility]     Patient/Family In Agreement With Plan yes    Additional Comments Spoke with admissions cordinator covering for Alfreda and the patient has been accepted to [facility] and bed will be available Saturday for transfer  - Patients daughterShavonne at bedside is able to transport patient - her #[phone] -  can call Alfreda's phone tomorrow with any issues [phone] and report can be called to  [phone] -  [phone]       01/12/18 1050    Case Management/Social Work Plan    Plan Waiting on bed at the [facility]     Patient/Family In Agreement With Plan yes    Additional Comments Spoke with Alfreda at [facility] this morning - the bed she thought would be available is no longer going to be an option as that patient did not leave - Alfreda is going to continue to watch for open beds and will call me if one is available hopefully today or for tomrorow - Patient notified - offered to make referrals elsewhere but patient wants to wait for bed to open at the [facility] - I have called to check bed status at [facility] as that facility is close to the patients home as well, awaiting return call from there - Patients daughter will be able to transport but lives in [place] and weather may become an issue -  following - aw [phone]               Discharge Codes     None            Stephie Jacobs RN

## 2018-01-12 NOTE — PROGRESS NOTES
Continued Stay Note  [facility]     Patient Name: Stefanie Russell  MRN: [id number]  Today's Date: 1/12/2018    Admit Date: 1/8/2018          Discharge Plan       01/12/18 1050    Case Management/Social Work Plan    Plan Waiting on bed at the [facility]     Patient/Family In Agreement With Plan yes    Additional Comments Spoke with Alfreda at [facility] this morning - the bed she thought would be available is no longer going to be an option as that patient did not leave - Alfreda is going to continue to watch for open beds and will call me if one is available hopefully today or for tomorrow - Patient notified - offered to make referrals elsewhere but patient wants to wait for bed to open at the [facility] - I have called to check bed status at [facility] as that facility is close to the patients home as well, awaiting return call from there - Patients daughter will be able to transport but lives in [place] and weather may become an issue - CM following - aw [phone]               Discharge Codes     None            Stephie Jacobs RN

## 2018-01-12 NOTE — PROGRESS NOTES
[facility] Medicine Services  PROGRESS NOTE    Patient Name: Stefanie Russell  : 1945  MRN: [id number]    Date of Admission: 2018  Length of Stay: 3  Primary Care Physician: Eric Brunson DO    Subjective   Subjective     CC: pelvic pain    HPI:  Daughter in room today.  Her name is Shavonne.  On colace BID and MiraLax BID and still with hard to move bowels.  Percocet makes her sick she says.  Lortab better.  No pain reported today.    Review of Systems  Gen- No fevers, chills  CV- No chest pain, palpitations  Resp- No cough, dyspnea  GI- No N/V/D, abd pain    Otherwise ROS is negative except as mentioned in the HPI.    Objective   Objective     Vital Signs:   Temp:  [98 °F (36.7 °C)-98.5 °F (36.9 °C)] 98.4 °F (36.9 °C)  Heart Rate:  [74-80] 74  Resp:  [18] 18  BP: (118-130)/(64-72) 126/64        Physical Exam:  Constitutional: No acute distress, awake, alert  HENT: NCAT, mucous membranes moist  Respiratory: Clear to auscultation bilaterally, respiratory effort normal   Cardiovascular: RRR, no murmurs, rubs, or gallops, palpable pedal pulses bilaterally  Gastrointestinal: Positive bowel sounds, soft, nontender, nondistended  Musculoskeletal: No bilateral ankle edema  Psychiatric: Appropriate affect, cooperative  Neurologic: Oriented x 3, strength symmetric in all extremities, Cranial Nerves grossly intact to confrontation, speech clear  Skin: No rashes    Results Reviewed:  I have personally reviewed current lab, radiology, and data and agree.      Results from last 7 days  Lab Units 18  0818  193   WBC 10*3/mm3 7.49 10.48   HEMOGLOBIN g/dL 12.7 12.5   HEMATOCRIT % 37.9 36.5   PLATELETS 10*3/mm3 242 227       Results from last 7 days  Lab Units 18  0801 01/10/18  0628 18  0824 18   SODIUM mmol/L 135 134  --  136   POTASSIUM mmol/L 4.1 4.2 4.0 3.2*   CHLORIDE mmol/L 106 102  --  103   CO2 mmol/L 26.0 26.0  --  24.0   BUN mg/dL 18 18   --  19   CREATININE mg/dL 0.60 0.70  --  0.90   GLUCOSE mg/dL 85 107*  --  113*   CALCIUM mg/dL 8.3* 8.2*  --  8.6*     No results found for: BNP  No results found for: PHART    Microbiology Results Abnormal     None        CT pelvis reviewed with right sided fracture noted. Agree with interpretation.    Imaging Results (last 24 hours)     ** No results found for the last 24 hours. **        I have reviewed the medications.    Assessment/Plan   Assessment / Plan     Hospital Problem List     * (Principal)Multiple closed fractures of pelvis with stable disruption of pelvic [place]    History of pelvic fracture    Fall at home, initial encounter    Osteoporosis    Frequent falls    Hypokalemia    Head trauma             Brief Hospital Course to date:  Stefanie Russell is a 73 y.o. female who presented from home after multiple falls found to have right pelvic fracture.    Assessment & Plan:  --Awaiting ortho confirmation but fracture likely to be managed non-operatively. Continue pain control and await PT eval.  --Has had history of multiple recent falls, all mechanical in nature per patient and daughter.  --de-escalate pain medication to try to get home on oral meds  --restarted Effexor  --continue IV fluids  --increased skeletal muscle relaxant and seems to be helping  --increase bowel regimen for several days now  --decreased norvasc yesterday.  May be able to come off as not a home med  Opiate Induced Constipation  - MiraLax BID / Colace BID  - add one time lactulose  - minimize narcotic analgesia    Continue Lortab only as needed    DVT Prophylaxis:  Lovenox    CODE STATUS: Full Code    Disposition: I expect the patient to be discharged to rehab possibly at [facility]    Tree Amador MD  01/12/18  4:17 PM

## 2018-01-12 NOTE — PLAN OF CARE
Problem: Patient Care Overview (Adult)  Goal: Plan of Care Review  Outcome: Ongoing (interventions implemented as appropriate)   01/12/18 0438   Coping/Psychosocial Response Interventions   Plan Of Care Reviewed With patient   Patient Care Overview   Progress improving     Goal: Adult Individualization and Mutuality  Outcome: Ongoing (interventions implemented as appropriate)   01/12/18 0438   Individualization   Patient Specific Interventions monitor pain q 2 hours and prn. encourage use of bedside commode       Problem: Fall Risk (Adult)  Goal: Identify Related Risk Factors and Signs and Symptoms  Outcome: Ongoing (interventions implemented as appropriate)   01/10/18 1429   Fall Risk   Fall Risk: Related Risk Factors age-related changes;history of falls;gait/mobility problems   Fall Risk: Signs and Symptoms presence of risk factors     Goal: Absence of Falls  Outcome: Ongoing (interventions implemented as appropriate)   01/12/18 0438   Fall Risk (Adult)   Absence of Falls making progress toward outcome       Problem: Pressure Ulcer Risk (Ed Scale) (Adult,Obstetrics,Pediatric)  Goal: Identify Related Risk Factors and Signs and Symptoms  Outcome: Ongoing (interventions implemented as appropriate)   01/12/18 0438   Pressure Ulcer Risk (Ed Scale)   Related Risk Factors (Pressure Ulcer Risk (Ed Scale)) age extremes;hospitalization prolonged;mobility impaired     Goal: Skin Integrity  Outcome: Ongoing (interventions implemented as appropriate)   01/12/18 0438   Pressure Ulcer Risk (Ed Scale) (Adult,Obstetrics,Pediatric)   Skin Integrity making progress toward outcome       Problem: Orthopaedic Fracture (Adult)  Goal: Signs and Symptoms of Listed Potential Problems Will be Absent or Manageable (Orthopaedic Fracture)  Outcome: Ongoing (interventions implemented as appropriate)   01/12/18 0438   Orthopaedic Fracture   Problems Assessed (Orthopaedic Fracture) all   Problems Present (Orthopaedic Fracture)  functional deficit/ self-care deficit;pain;situational response;gastrointestinal complications

## 2018-01-12 NOTE — PLAN OF CARE
Problem: Patient Care Overview (Adult)  Goal: Plan of Care Review  Outcome: Ongoing (interventions implemented as appropriate)   01/12/18 1603   Coping/Psychosocial Response Interventions   Plan Of Care Reviewed With patient   Patient Care Overview   Progress improving   Outcome Evaluation   Outcome Summary/Follow up Plan patient walked with pt today, sat up in the chair, had a BM, patient is a possible discharge to the [facility] tomorrow        Problem: Fall Risk (Adult)  Goal: Identify Related Risk Factors and Signs and Symptoms  Outcome: Ongoing (interventions implemented as appropriate)   01/12/18 1603   Fall Risk   Fall Risk: Related Risk Factors age-related changes;gait/mobility problems;history of falls   Fall Risk: Signs and Symptoms presence of risk factors     Goal: Absence of Falls  Outcome: Ongoing (interventions implemented as appropriate)   01/12/18 1603   Fall Risk (Adult)   Absence of Falls making progress toward outcome       Problem: Orthopaedic Fracture (Adult)  Goal: Signs and Symptoms of Listed Potential Problems Will be Absent or Manageable (Orthopaedic Fracture)  Outcome: Ongoing (interventions implemented as appropriate)   01/12/18 1603   Orthopaedic Fracture   Problems Assessed (Orthopaedic Fracture) all   Problems Present (Orthopaedic Fracture) situational response

## 2018-01-12 NOTE — PROGRESS NOTES
No ortho change.  Afeb Vs stable and distal nvs intact.  Slowly mobilizing, will need rehab.  Mild pain meds as needed.

## 2018-01-12 NOTE — PLAN OF CARE
Problem: Patient Care Overview (Adult)  Goal: Plan of Care Review  Outcome: Ongoing (interventions implemented as appropriate)   01/12/18 1716   Coping/Psychosocial Response Interventions   Plan Of Care Reviewed With patient   Patient Care Overview   Progress improving   Outcome Evaluation   Outcome Summary/Follow up Plan Able to advance legs independently. Less pain with walking.        Problem: Inpatient Physical Therapy  Goal: Bed Mobility Goal LTG- PT   01/09/18 1704   Bed Mobility PT LTG   Bed Mobility PT LTG, Date Established 01/09/18   Bed Mobility PT LTG, Time to Achieve 5 - 7 days   Bed Mobility PT LTG, Activity Type supine to sit/sit to supine;sidelying to sit/sit to sidelying   Bed Mobility PT LTG, [place] Level conditional independence   Bed Mobility PT Goal LTG, Assist Device bed rails   Bed Mobility PT LTG, Outcome goal ongoing     Goal: Gait Training Goal LTG- PT   01/09/18 1704   Gait Training PT LTG   Gait Training Goal PT LTG, Date Established 01/09/18   Gait Training Goal PT LTG, Time to Achieve 5 - 7 days   Gait Training Goal PT LTG, [place] Level contact guard assist   Gait Training Goal PT LTG, Assist Device walker, rolling   Gait Training Goal PT LTG, Distance to Achieve 75   Gait Training Goal PT LTG, Outcome goal ongoing

## 2018-01-13 VITALS
RESPIRATION RATE: 20 BRPM | HEART RATE: 76 BPM | DIASTOLIC BLOOD PRESSURE: 63 MMHG | HEIGHT: 57 IN | TEMPERATURE: 98.3 F | BODY MASS INDEX: 29.77 KG/M2 | SYSTOLIC BLOOD PRESSURE: 145 MMHG | OXYGEN SATURATION: 92 % | WEIGHT: 138 LBS

## 2018-01-13 PROCEDURE — 25010000002 ENOXAPARIN PER 10 MG: Performed by: NURSE PRACTITIONER

## 2018-01-13 PROCEDURE — 97110 THERAPEUTIC EXERCISES: CPT

## 2018-01-13 PROCEDURE — 99239 HOSP IP/OBS DSCHRG MGMT >30: CPT | Performed by: HOSPITALIST

## 2018-01-13 PROCEDURE — 97116 GAIT TRAINING THERAPY: CPT

## 2018-01-13 RX ORDER — HYDROCODONE BITARTRATE AND ACETAMINOPHEN 5; 325 MG/1; MG/1
1 TABLET ORAL EVERY 12 HOURS PRN
Qty: 4 TABLET | Refills: 0 | Status: SHIPPED | OUTPATIENT
Start: 2018-01-13 | End: 2018-01-15

## 2018-01-13 RX ORDER — HYDROCODONE BITARTRATE AND ACETAMINOPHEN 5; 325 MG/1; MG/1
1 TABLET ORAL EVERY 12 HOURS PRN
Status: DISCONTINUED | OUTPATIENT
Start: 2018-01-13 | End: 2018-01-13 | Stop reason: HOSPADM

## 2018-01-13 RX ORDER — PSEUDOEPHEDRINE HCL 30 MG
100 TABLET ORAL 2 TIMES DAILY
Qty: 60 CAPSULE | Refills: 0 | Status: SHIPPED | OUTPATIENT
Start: 2018-01-13 | End: 2018-02-12

## 2018-01-13 RX ADMIN — HYDROCODONE BITARTRATE AND ACETAMINOPHEN 1 TABLET: 5; 325 TABLET ORAL at 05:24

## 2018-01-13 RX ADMIN — AMLODIPINE BESYLATE 2.5 MG: 2.5 TABLET ORAL at 08:19

## 2018-01-13 RX ADMIN — METHOCARBAMOL 1500 MG: 750 TABLET ORAL at 05:24

## 2018-01-13 RX ADMIN — ATENOLOL 25 MG: 25 TABLET ORAL at 08:20

## 2018-01-13 RX ADMIN — HYDROCODONE BITARTRATE AND ACETAMINOPHEN 1 TABLET: 5; 325 TABLET ORAL at 14:05

## 2018-01-13 RX ADMIN — VENLAFAXINE HYDROCHLORIDE 75 MG: 75 CAPSULE, EXTENDED RELEASE ORAL at 08:19

## 2018-01-13 RX ADMIN — ENOXAPARIN SODIUM 40 MG: 40 INJECTION SUBCUTANEOUS at 08:20

## 2018-01-13 RX ADMIN — DOCUSATE SODIUM 100 MG: 100 CAPSULE, LIQUID FILLED ORAL at 08:19

## 2018-01-13 RX ADMIN — LISINOPRIL 20 MG: 20 TABLET ORAL at 08:19

## 2018-01-13 NOTE — DISCHARGE SUMMARY
[facility] Medicine Services  DISCHARGE SUMMARY    Patient Name: Stefanie Russell  : 1945  MRN: [id number]    Date of Admission: 2018  Date of Discharge:  2018 (Saturday)  Primary Care Physician: Eric Brunson DO    Consults     Date and Time Order Name Status Description    2018 2326 Inpatient Consult to Orthopedic Surgery Completed         Mars Philip - Orthopedic Surgeon    Hospital Course     Presenting Problem:   Multiple closed fractures of pelvis with stable disruption of pelvic ring, initial encounter [S32.810A]  Multiple closed fractures of pelvis with stable disruption of pelvic ring, initial encounter [S32.810A]    Active Hospital Problems (** Indicates Principal Problem)    Diagnosis Date Noted   • **Multiple closed fractures of pelvis with stable disruption of pelvic [place] [S32.810A] 2018   • History of pelvic fracture [Z87.81] 2018   • Fall at home, initial encounter [W19.XXXA, Y92.099] 2018   • Osteoporosis [M81.0] 2018   • Frequent falls [R29.6] 2018   • Hypokalemia [E87.6] 2018   • Head trauma [S09.90XA] 2018      Resolved Hospital Problems    Diagnosis Date Noted Date Resolved   No resolved problems to display.   Acute on Chronic Pelvic Fractures  Opiate Induced Constipation    Hospital Course:  Stefanie Russell is a 73 y.o. female female with history of falls who presented with intractable pain and was found to have multiple pelvic abnormalities likely due to falls.  She was seen by Orthopedic Surgery and felt that her injuries were not surgical in nature and that she would need rehab.  She has baseline constipation issues and was found to have acute on chronic constipation here.  She had to be given MiraLax BID, colace BID and lactulose until things broke free and things improved.  Ideally we would de-escalate and stop narcotic analgesia as soon as possible in a effort to minimize side effects of  these medications.          Day of Discharge     HPI: says she is going to rehab today    Review of Systems  Gen- No fevers, chills  CV- No chest pain, palpitations  Resp- No cough, dyspnea  GI- No N/V/D, abd pain    Otherwise ROS is negative except as mentioned in the HPI.    Vital Signs:   Temp:  [98.1 °F (36.7 °C)-98.4 °F (36.9 °C)] 98.3 °F (36.8 °C)  Heart Rate:  [74-85] 76  Resp:  [18-20] 20  BP: (126-145)/(58-77) 145/63     Physical Exam:  Constitutional: No acute distress, awake, alert  HENT: NCAT, mucous membranes moist  Respiratory: Clear to auscultation bilaterally, respiratory effort normal   Cardiovascular: RRR, no murmurs, rubs, or gallops, palpable pedal pulses bilaterally  Gastrointestinal: Positive bowel sounds, soft, nontender, nondistended  Musculoskeletal: No bilateral ankle edema  Psychiatric: Appropriate affect, cooperative  Neurologic: Oriented x 3, strength symmetric in all extremities, Cranial Nerves grossly intact to confrontation, speech clear  Skin: No rashes    Pertinent  and/or Most Recent Results       Results from last 7 days  Lab Units 01/12/18  0801 01/10/18  0628 01/09/18  0824 01/08/18  1939   WBC 10*3/mm3 7.49  --   --  10.48   HEMOGLOBIN g/dL 12.7  --   --  12.5   HEMATOCRIT % 37.9  --   --  36.5   PLATELETS 10*3/mm3 242  --   --  227   SODIUM mmol/L 135 134  --  136   POTASSIUM mmol/L 4.1 4.2 4.0 3.2*   CHLORIDE mmol/L 106 102  --  103   CO2 mmol/L 26.0 26.0  --  24.0   BUN mg/dL 18 18  --  19   CREATININE mg/dL 0.60 0.70  --  0.90   GLUCOSE mg/dL 85 107*  --  113*   CALCIUM mg/dL 8.3* 8.2*  --  8.6*           Invalid input(s): PROT, LABALBU        Invalid input(s): TG, LDLREALC      Brief Urine Lab Results     None          Microbiology Results Abnormal     None          Imaging Results (all)     Procedure Component Value Units Date/Time    CT Pelvis Without Contrast [[id number]] Collected:  01/08/18 6722     Updated:  01/08/18 1933    Narrative:       EXAM:    CT Pelvis Without  Intravenous Contrast    EXAM DATE/TIME:    1/8/2018 6:32  PM    CLINICAL HISTORY:    73 years old, female; Trauma.    TECHNIQUE:    Axial computed tomography images of the pelvis without intravenous contrast.    All CT scans at this facility use one or more dose reduction techniques, viz.:   automated exposure control; ma/kV adjustment per patient size (including   targeted exams where dose is matched to indication; i.e. head); or iterative   reconstruction technique.    Coronal and sagittal reformatted images were created and reviewed.    COMPARISON:    No relevant prior studies available.    FINDINGS:    Acute, nondisplaced fracture at the junction between the right acetabulum and   superior pubic ramus.  Acute, nondisplaced fracture of the right inferior pubic   ramus.      Healed bilateral sacral and left parasymphyseal pubic fractures.  No   visualized acute proximal femoral fracture or hip dislocation.  Moderate   chronic arthrosis at the bilateral sacroiliac joints.  Severe chronic   degenerative changes in the lower lumbar spine.  No aggressive osseous lesion.      No large pelvic sidewall or other soft tissue hematoma.  Mild distention of   the urinary bladder.  Colonic constipation.      Impression:         Acute fractures at the junction between the right acetabulum and superior   pubic ramus, and involving the right inferior pubic ramus.  Healed bilateral   sacral and left parasymphyseal pubic fractures.  No acute proximal femoral   fracture or hip dislocation.    THIS DOCUMENT HAS BEEN ELECTRONICALLY SIGNED BY MEERA WOOTEN MD    CT Head Without Contrast [[id number]] Collected:  01/08/18 2344     Updated:  01/09/18 0012    Narrative:       EXAM:    CT Head Without Intravenous Contrast    EXAM DATE/TIME:    1/8/2018 11:44  PM    CLINICAL HISTORY:    73 years old, female; Pain, Headache not specified; Fall a week ago, hit back   of head.    TECHNIQUE:    Axial computed tomography images of the head/brain  "without intravenous   contrast.  All CT scans at this facility use one or more dose reduction   techniques, viz.: automated exposure control; ma/kV adjustment per patient size   (including targeted exams where dose is matched to indication; i.e. head); or   iterative reconstruction technique.    COMPARISON:    No relevant prior studies available.    FINDINGS:    There is no acute skull fracture, parenchymal hemorrhage, extraaxial   collection, or acute transcortical infarction.  Patchy foci of low attenuation   within the periventricular white matter are most compatible with chronic   microvascular disease.  There are is mild diffuse volume loss without mass   effect or midline shift.  Vascular calcifications are noted.  The paranasal   sinuses and mastoid air cells are clear.      Impression:         No acute intracranial abnormality.  Patchy microvascular changes and mild   volume loss.    THIS DOCUMENT HAS BEEN ELECTRONICALLY SIGNED BY MEERA WOOTEN MD    XR Spine Lumbar 2 or 3 View [[id number]] Collected:  01/08/18 2359     Updated:  01/09/18 0036    Narrative:       EXAM:    XR Lumbar Spine, 2 or 3 Views    EXAM DATE/TIME:    1/8/2018 11:59 PM    CLINICAL HISTORY:    73 years old, female; Pain.  Initial Injury or trauma; Fall; Sprain or   strain, lumbar ligaments; Injury date: 01/08/2018.  \"fell several days ago   injuring her lower back.    TECHNIQUE:    Frontal and lateral views of the lumbar spine.    COMPARISON:    CR - XR HIP W OR WO PELVIS 2-3 VIEW RIGHT 2018-01-08 18:26    FINDINGS:    No visualized acute lumbar fracture.  Grade 1 anterolisthesis of L5 on S1   mild dextroconvex curvature of the lower lumbar spine.      Lumbar degenerative disc disease is moderate at L4-L5 and severe L5-S1.    There is moderate to severe multilevel lumbar facet arthrosis.      Chronic-appearing mid sacral fracture.  Nondisplaced acute right pubic   fractures.      Impression:         No visualized acute lumbar injury.  " Chronic lumbar degenerative changes,   particularly distally.      Chronic-appearing mid sacral fracture.  Nondisplaced acute right pubic   fractures.    THIS DOCUMENT HAS BEEN ELECTRONICALLY SIGNED BY MEERA WOOTEN MD    XR Hip With or Without Pelvis 2 - 3 View Right [[id number]] Collected:  01/09/18 0906     Updated:  01/10/18 1212    Narrative:       EXAMINATION: XR HIP W OR WO PELVIS 2-3 VIEW RIGHT-      INDICATION: Fall.      COMPARISON: None.     FINDINGS: Imaging of the pelvis and two views of the right hip reveal  degenerative changes seen within the lower lumbar spine and sacroiliac  joints bilaterally. There is deformity identified of the left pubic  symphysis possibly from prior healed injury. Clinical correlation is  needed. The right hip is unremarkable. No fracture or dislocation is  seen within the right hip. Sacroiliac joints reveal degenerative changes  seen bilaterally.           Impression:       Deformity at the left pubic symphysis possibly from prior  healed injury. No acute bony abnormality is identified. The right hip is  intact and unremarkable.  Degenerative change is seen within the  sacroiliac joints bilaterally.     D:  01/08/2018  E:  01/09/2018     This report was finalized on 1/10/2018 12:10 PM by Dr. Arabella De La Fuente MD.             Discharge Details      Stefanie Russell   Home Medication Instructions FABIO:[id number]    Printed on:01/13/18 1235   Medication Information                      atenolol (TENORMIN) 25 MG tablet  Take 25 mg by mouth Daily.             calcium citrate-vitamin d (CITRACAL) 200-250 MG-UNIT tablet tablet  Take  by mouth 2 (Two) Times a Day.             docusate sodium 100 MG capsule  Take 100 mg by mouth 2 (Two) Times a Day for 30 days.             HYDROcodone-acetaminophen (NORCO) 5-325 MG per tablet  Take 1 tablet by mouth Every 12 (Twelve) Hours As Needed for Severe Pain  for up to 2 days.             lisinopril (PRINIVIL,ZESTRIL) 20 MG tablet  Take 20 mg  by mouth Daily.             lovastatin (MEVACOR) 20 MG tablet  Take 20 mg by mouth Every Night.             venlafaxine (EFFEXOR) 75 MG tablet  Take 75 mg by mouth Daily.               Discharge Disposition:  Rehab Facility or Unit (DC - External)    Discharge Diet:  As tolerated    Discharge Activity:  As tolerated    Special Instructions:  De-escalate narcotic pain medication as soon as possible    No future appointments.    Additional Instructions for the Follow-ups that You Need to Schedule     Ambulatory Referral to Home Health    As directed    Face to Face Visit Date:  1/10/2018    Follow-up Provider for Plan of Care?:  I treated the patient in an acute care facility and will not continue treatment after discharge.    Follow-up Provider:  GLENROY HOPE [[id number]]    Reason/Clinical Findings:  s/p acetabulum fx    Describe mobility limitations that make leaving home difficult:  impaired functional mobility, balalce, gait, and endurance    Nursing/Therapeutic Services Requested:  Physical Therapy    PT orders:  Gait Training Transfer training Strengthening    Weight Bearing Status:  As Tolerated    Frequency:  1 Week 1           Discharge Follow-up with PCP    As directed    Follow Up Details:  Primary Care Physician - Glenroy Hope - 1 - 2 weeks                   Patient struggled with constipation during this hospital stay and increasing dose of medication used to induce bowel movement.  It appears she has chronic constipation.    Wean off Lortab as soon as possible    Going to [facility] for rehab today.    Time Spent on Discharge:  41 minutes    Tree Amador MD  01/13/18  12:35 PM

## 2018-01-13 NOTE — PROGRESS NOTES
Continued Stay Note  [facility]     Patient Name: Stefanie Russell  MRN: [id number]  Today's Date: 1/13/2018    Admit Date: 1/8/2018          Discharge Plan       01/13/18 1117    Case Management/Social Work Plan    Plan [place] [facility]    Patient/Family In Agreement With Plan yes    Additional Comments Per Dr. Amador, patient is medically ready for discharge and can transfer today. Met with patient in the room, and her daughter will provide her ride. CM will fax the discharge summary when available to [phone]. RN, please call report to [phone], and please send a copy of the DC summary/AVS and any hard scripts in the discharge packet. Thank you. jonathon Lopez. [phone]              Discharge Codes       01/13/18 1116    Discharge Codes    Discharge Codes 03  Discharged/transferred to skilled nursing facility (SNF) with Medicare certification in anticipation of skilled care            Jessica Aleman

## 2018-01-13 NOTE — PLAN OF CARE
Problem: Patient Care Overview (Adult)  Goal: Plan of Care Review  Outcome: Ongoing (interventions implemented as appropriate)   01/13/18 1247   Coping/Psychosocial Response Interventions   Plan Of Care Reviewed With patient;daughter   Patient Care Overview   Progress improving       Problem: Fall Risk (Adult)  Goal: Identify Related Risk Factors and Signs and Symptoms  Outcome: Ongoing (interventions implemented as appropriate)   01/13/18 1247   Fall Risk   Fall Risk: Related Risk Factors age-related changes;fear of falling;gait/mobility problems;history of falls   Fall Risk: Signs and Symptoms presence of risk factors       Problem: Pressure Ulcer Risk (Ed Scale) (Adult,Obstetrics,Pediatric)  Goal: Identify Related Risk Factors and Signs and Symptoms  Outcome: Ongoing (interventions implemented as appropriate)   01/13/18 1247   Pressure Ulcer Risk (Ed Scale)   Related Risk Factors (Pressure Ulcer Risk (Ed Scale)) age extremes;hospitalization prolonged;mobility impaired       Problem: Orthopaedic Fracture (Adult)  Goal: Signs and Symptoms of Listed Potential Problems Will be Absent or Manageable (Orthopaedic Fracture)   01/13/18 1247   Orthopaedic Fracture   Problems Assessed (Orthopaedic Fracture) all   Problems Present (Orthopaedic Fracture) functional deficit/ self-care deficit;pain

## 2018-01-13 NOTE — PLAN OF CARE
Problem: Patient Care Overview (Adult)  Goal: Plan of Care Review  Outcome: Ongoing (interventions implemented as appropriate)   01/13/18 1107   Coping/Psychosocial Response Interventions   Plan Of Care Reviewed With patient   Patient Care Overview   Progress progress toward functional goals as expected   Outcome Evaluation   Outcome Summary/Follow up Plan patient with increased right pelivc pain increasing assist needed for bed mobility and transfers. Gait distance also limited. Possible discharge to [facility] today?       Problem: Inpatient Physical Therapy  Goal: Bed Mobility Goal LTG- PT  Outcome: Ongoing (interventions implemented as appropriate)   01/09/18 1704 01/13/18 1107   Bed Mobility PT LTG   Bed Mobility PT LTG, Date Established 01/09/18 --    Bed Mobility PT LTG, Time to Achieve 5 - 7 days --    Bed Mobility PT LTG, Activity Type supine to sit/sit to supine;sidelying to sit/sit to sidelying --    Bed Mobility PT LTG, [place] Level conditional independence --    Bed Mobility PT Goal LTG, Assist Device bed rails --    Bed Mobility PT LTG, Date Goal Reviewed --  01/13/18   Bed Mobility PT LTG, Outcome --  goal ongoing     Goal: Gait Training Goal LTG- PT  Outcome: Ongoing (interventions implemented as appropriate)   01/09/18 1704 01/13/18 1107   Gait Training PT LTG   Gait Training Goal PT LTG, Date Established 01/09/18 --    Gait Training Goal PT LTG, Time to Achieve 5 - 7 days --    Gait Training Goal PT LTG, [place] Level contact guard assist --    Gait Training Goal PT LTG, Assist Device walker, rolling --    Gait Training Goal PT LTG, Distance to Achieve 75 --    Gait Training Goal PT LTG, Date Goal Reviewed --  01/13/18   Gait Training Goal PT LTG, Outcome --  goal ongoing

## 2018-01-13 NOTE — DISCHARGE PLACEMENT REQUEST
"SHEREE IBRAHIM, RN    [phone]          Stefanie Clayton (73 y.o. Female)     Date of Birth Social Security Number Address Home Phone MRN    1945  [address] [place] [address] [phone] [id number]    [employer] Marital Status          Sabianist        Admission Date Admission Type Admitting Provider Attending Provider Department, Room/Bed    18 Emergency Tree Amador MD Schnell, Aaron, MD [facility] 2F, S204/    Discharge Date Discharge Disposition Discharge Destination         Rehab Facility or Unit (DC - External)             Attending Provider: Tree Amador MD     Allergies:  No Known Allergies    Isolation:  None   Infection:  None   Code Status:  FULL    Ht:  144.8 cm (57\")   Wt:  62.6 kg (138 lb)    Admission Cmt:  None   Principal Problem:  Multiple closed fractures of pelvis with stable disruption of pelvic [place] [S32.810A]                 Active Insurance as of 2018     Primary Coverage     Payor Plan Insurance Group Employer/Plan Group    MEDICARE MEDICARE A & B      Payor Plan Address Payor Plan Phone Number Effective From Effective To    [address] [phone] [address]     [place] [address]       Subscriber Name Subscriber Birth Date Member ID       STEFANIE CLAYTON 1945 [id number]                 Emergency Contacts      (Rel.) Home Phone Work Phone Mobile Phone    Contact,No (Other) -- -- [phone]               Discharge Summary      Tree Amador MD at 2018 12:35 PM              [facility]  DISCHARGE SUMMARY    Patient Name: Stefanie Clayton  : 1945  MRN: [id number]    Date of Admission: 2018  Date of Discharge:  2018 (Saturday)  Primary Care Physician: Eric Brunson DO    Consults     Date and Time Order Name Status Description    2018 2326 Inpatient Consult to Orthopedic Surgery Completed         [facility] - Orthopedic " Surgeon    Hospital Course     Presenting Problem:   Multiple closed fractures of pelvis with stable disruption of pelvic ring, initial encounter [S32.810A]  Multiple closed fractures of pelvis with stable disruption of pelvic ring, initial encounter [S32.810A]    Active Hospital Problems (** Indicates Principal Problem)    Diagnosis Date Noted   • **Multiple closed fractures of pelvis with stable disruption of pelvic [place] [S32.810A] 01/08/2018   • History of pelvic fracture [Z87.81] 01/08/2018   • Fall at home, initial encounter [W19.XXXA, Y92.099] 01/08/2018   • Osteoporosis [M81.0] 01/08/2018   • Frequent falls [R29.6] 01/08/2018   • Hypokalemia [E87.6] 01/08/2018   • Head trauma [S09.90XA] 01/08/2018      Resolved Hospital Problems    Diagnosis Date Noted Date Resolved   No resolved problems to display.   Acute on Chronic Pelvic Fractures  Opiate Induced Constipation    Hospital Course:  Stefanie Russell is a 73 y.o. female female with history of falls who presented with intractable pain and was found to have multiple pelvic abnormalities likely due to falls.  She was seen by Orthopedic Surgery and felt that her injuries were not surgical in nature and that she would need rehab.  She has baseline constipation issues and was found to have acute on chronic constipation here.  She had to be given MiraLax BID, colace BID and lactulose until things broke free and things improved.  Ideally we would de-escalate and stop narcotic analgesia as soon as possible in a effort to minimize side effects of these medications.          Day of Discharge     HPI: says she is going to rehab today    Review of Systems  Gen- No fevers, chills  CV- No chest pain, palpitations  Resp- No cough, dyspnea  GI- No N/V/D, abd pain    Otherwise ROS is negative except as mentioned in the HPI.    Vital Signs:   Temp:  [98.1 °F (36.7 °C)-98.4 °F (36.9 °C)] 98.3 °F (36.8 °C)  Heart Rate:  [74-85] 76  Resp:  [18-20] 20  BP: (126-145)/(58-77)  145/63     Physical Exam:  Constitutional: No acute distress, awake, alert  HENT: NCAT, mucous membranes moist  Respiratory: Clear to auscultation bilaterally, respiratory effort normal   Cardiovascular: RRR, no murmurs, rubs, or gallops, palpable pedal pulses bilaterally  Gastrointestinal: Positive bowel sounds, soft, nontender, nondistended  Musculoskeletal: No bilateral ankle edema  Psychiatric: Appropriate affect, cooperative  Neurologic: Oriented x 3, strength symmetric in all extremities, Cranial Nerves grossly intact to confrontation, speech clear  Skin: No rashes    Pertinent  and/or Most Recent Results       Results from last 7 days  Lab Units 01/12/18  0801 01/10/18  0628 01/09/18  0824 01/08/18 1939   WBC 10*3/mm3 7.49  --   --  10.48   HEMOGLOBIN g/dL 12.7  --   --  12.5   HEMATOCRIT % 37.9  --   --  36.5   PLATELETS 10*3/mm3 242  --   --  227   SODIUM mmol/L 135 134  --  136   POTASSIUM mmol/L 4.1 4.2 4.0 3.2*   CHLORIDE mmol/L 106 102  --  103   CO2 mmol/L 26.0 26.0  --  24.0   BUN mg/dL 18 18  --  19   CREATININE mg/dL 0.60 0.70  --  0.90   GLUCOSE mg/dL 85 107*  --  113*   CALCIUM mg/dL 8.3* 8.2*  --  8.6*           Invalid input(s): PROT, LABALBU        Invalid input(s): TG, LDLREALC      Brief Urine Lab Results     None          Microbiology Results Abnormal     None          Imaging Results (all)     Procedure Component Value Units Date/Time    CT Pelvis Without Contrast [[id number]] Collected:  01/08/18 1832     Updated:  01/08/18 1933    Narrative:       EXAM:    CT Pelvis Without Intravenous Contrast    EXAM DATE/TIME:    1/8/2018 6:32  PM    CLINICAL HISTORY:    73 years old, female; Trauma.    TECHNIQUE:    Axial computed tomography images of the pelvis without intravenous contrast.    All CT scans at this facility use one or more dose reduction techniques, viz.:   automated exposure control; ma/kV adjustment per patient size (including   targeted exams where dose is matched to indication;  i.e. head); or iterative   reconstruction technique.    Coronal and sagittal reformatted images were created and reviewed.    COMPARISON:    No relevant prior studies available.    FINDINGS:    Acute, nondisplaced fracture at the junction between the right acetabulum and   superior pubic ramus.  Acute, nondisplaced fracture of the right inferior pubic   ramus.      Healed bilateral sacral and left parasymphyseal pubic fractures.  No   visualized acute proximal femoral fracture or hip dislocation.  Moderate   chronic arthrosis at the bilateral sacroiliac joints.  Severe chronic   degenerative changes in the lower lumbar spine.  No aggressive osseous lesion.      No large pelvic sidewall or other soft tissue hematoma.  Mild distention of   the urinary bladder.  Colonic constipation.      Impression:         Acute fractures at the junction between the right acetabulum and superior   pubic ramus, and involving the right inferior pubic ramus.  Healed bilateral   sacral and left parasymphyseal pubic fractures.  No acute proximal femoral   fracture or hip dislocation.    THIS DOCUMENT HAS BEEN ELECTRONICALLY SIGNED BY MEERA WOOTEN MD    CT Head Without Contrast [[id number]] Collected:  01/08/18 2344     Updated:  01/09/18 0012    Narrative:       EXAM:    CT Head Without Intravenous Contrast    EXAM DATE/TIME:    1/8/2018 11:44  PM    CLINICAL HISTORY:    73 years old, female; Pain, Headache not specified; Fall a week ago, hit back   of head.    TECHNIQUE:    Axial computed tomography images of the head/brain without intravenous   contrast.  All CT scans at this facility use one or more dose reduction   techniques, viz.: automated exposure control; ma/kV adjustment per patient size   (including targeted exams where dose is matched to indication; i.e. head); or   iterative reconstruction technique.    COMPARISON:    No relevant prior studies available.    FINDINGS:    There is no acute skull fracture, parenchymal hemorrhage,  "extraaxial   collection, or acute transcortical infarction.  Patchy foci of low attenuation   within the periventricular white matter are most compatible with chronic   microvascular disease.  There are is mild diffuse volume loss without mass   effect or midline shift.  Vascular calcifications are noted.  The paranasal   sinuses and mastoid air cells are clear.      Impression:         No acute intracranial abnormality.  Patchy microvascular changes and mild   volume loss.    THIS DOCUMENT HAS BEEN ELECTRONICALLY SIGNED BY MEERA WOOTEN MD    XR Spine Lumbar 2 or 3 View [[id number]] Collected:  01/08/18 2359     Updated:  01/09/18 0036    Narrative:       EXAM:    XR Lumbar Spine, 2 or 3 Views    EXAM DATE/TIME:    1/8/2018 11:59 PM    CLINICAL HISTORY:    73 years old, female; Pain.  Initial Injury or trauma; Fall; Sprain or   strain, lumbar ligaments; Injury date: 01/08/2018.  \"fell several days ago   injuring her lower back.    TECHNIQUE:    Frontal and lateral views of the lumbar spine.    COMPARISON:    CR - XR HIP W OR WO PELVIS 2-3 VIEW RIGHT 2018-01-08 18:26    FINDINGS:    No visualized acute lumbar fracture.  Grade 1 anterolisthesis of L5 on S1   mild dextroconvex curvature of the lower lumbar spine.      Lumbar degenerative disc disease is moderate at L4-L5 and severe L5-S1.    There is moderate to severe multilevel lumbar facet arthrosis.      Chronic-appearing mid sacral fracture.  Nondisplaced acute right pubic   fractures.      Impression:         No visualized acute lumbar injury.  Chronic lumbar degenerative changes,   particularly distally.      Chronic-appearing mid sacral fracture.  Nondisplaced acute right pubic   fractures.    THIS DOCUMENT HAS BEEN ELECTRONICALLY SIGNED BY MEERA WOOTEN MD    XR Hip With or Without Pelvis 2 - 3 View Right [[id number]] Collected:  01/09/18 0906     Updated:  01/10/18 1212    Narrative:       EXAMINATION: XR HIP W OR WO PELVIS 2-3 VIEW RIGHT-      INDICATION: Fall.    "   COMPARISON: None.     FINDINGS: Imaging of the pelvis and two views of the right hip reveal  degenerative changes seen within the lower lumbar spine and sacroiliac  joints bilaterally. There is deformity identified of the left pubic  symphysis possibly from prior healed injury. Clinical correlation is  needed. The right hip is unremarkable. No fracture or dislocation is  seen within the right hip. Sacroiliac joints reveal degenerative changes  seen bilaterally.           Impression:       Deformity at the left pubic symphysis possibly from prior  healed injury. No acute bony abnormality is identified. The right hip is  intact and unremarkable.  Degenerative change is seen within the  sacroiliac joints bilaterally.     D:  01/08/2018  E:  01/09/2018     This report was finalized on 1/10/2018 12:10 PM by Dr. Arabella De La Fuente MD.             Discharge Details      Stefanie Russell   Home Medication Instructions FABIO:[id number]    Printed on:01/13/18 1237   Medication Information                      atenolol (TENORMIN) 25 MG tablet  Take 25 mg by mouth Daily.             calcium citrate-vitamin d (CITRACAL) 200-250 MG-UNIT tablet tablet  Take  by mouth 2 (Two) Times a Day.             docusate sodium 100 MG capsule  Take 100 mg by mouth 2 (Two) Times a Day for 30 days.             HYDROcodone-acetaminophen (NORCO) 5-325 MG per tablet  Take 1 tablet by mouth Every 12 (Twelve) Hours As Needed for Severe Pain  for up to 2 days.             lisinopril (PRINIVIL,ZESTRIL) 20 MG tablet  Take 20 mg by mouth Daily.             lovastatin (MEVACOR) 20 MG tablet  Take 20 mg by mouth Every Night.             venlafaxine (EFFEXOR) 75 MG tablet  Take 75 mg by mouth Daily.               Discharge Disposition:  Rehab Facility or Unit (DC - External)    Discharge Diet:  As tolerated    Discharge Activity:  As tolerated    Special Instructions:  De-escalate narcotic pain medication as soon as possible    No future  appointments.    Additional Instructions for the Follow-ups that You Need to Schedule     Ambulatory Referral to Home Health    As directed    Face to Face Visit Date:  1/10/2018    Follow-up Provider for Plan of Care?:  I treated the patient in an acute care facility and will not continue treatment after discharge.    Follow-up Provider:  GLENROY HOPE [[id number]]    Reason/Clinical Findings:  s/p acetabulum fx    Describe mobility limitations that make leaving home difficult:  impaired functional mobility, balalce, gait, and endurance    Nursing/Therapeutic Services Requested:  Physical Therapy    PT orders:  Gait Training Transfer training Strengthening    Weight Bearing Status:  As Tolerated    Frequency:  1 Week 1           Discharge Follow-up with PCP    As directed    Follow Up Details:  Primary Care Physician - Glenroy Hope - 1 - 2 weeks                   Patient struggled with constipation during this hospital stay and increasing dose of medication used to induce bowel movement.  It appears she has chronic constipation.    Wean off Lortab as soon as possible    Going to [facility] for rehab today.    Time Spent on Discharge:  41 minutes    Tree Amador MD  01/13/18  12:35 PM       Electronically signed by Tree Amador MD at 1/13/2018 12:49 PM

## 2018-01-13 NOTE — PROGRESS NOTES
Slow progress with PT.   No ortho change  Afeb VS stable  Distal nvs intact  Mobilize and future placement.

## 2018-01-13 NOTE — THERAPY TREATMENT NOTE
Acute Care - Physical Therapy Treatment Note  [facility]     Patient Name: Stefanie Russell  : 1945  MRN: [id number]  Today's Date: 2018  Onset of Illness/Injury or Date of Surgery Date: 18  Date of Referral to PT: 18  Referring Physician: Shamir CHINO    Admit Date: 2018    Visit Dx:    ICD-10-CM ICD-9-CM   1. Multiple closed fractures of pelvis with stable disruption of pelvic ring, initial encounter S32.810A 808.43   2. Intractable pain R52 780.96   3. Impaired functional mobility, balance, gait, and endurance Z74.09 V49.89     Patient Active Problem List   Diagnosis   • Multiple closed fractures of pelvis with stable disruption of pelvic [place]   • History of pelvic fracture   • Fall at home, initial encounter   • Osteoporosis   • Frequent falls   • Hypokalemia   • Head trauma               Adult Rehabilitation Note       18 1036 18 1430 18 1030    Rehab Assessment/Intervention    Discipline physical therapy assistant  -AS physical therapist  -SC physical therapist  -SC    Document Type therapy note (daily note)  -AS therapy note (daily note)  -SC therapy note (daily note)  -SC    Subjective Information complains of;pain;weakness  -AS complains of;pain;nausea/vomiting  -SC complains of;pain  -SC    Patient Effort, Rehab Treatment good  -AS good  -SC good  -SC    Symptoms Noted During/After Treatment increased pain  -AS increased pain  -SC increased pain  -SC    Symptoms Noted Comment  abdominal cramping  -SC     Precautions/Limitations fall precautions  -AS fall precautions  -SC fall precautions  -SC    Recorded by [AS] Wendy Greco PTA [SC] Emily Ohara, PT [SC] Emily Ohara, PT    Pain Assessment    Pain Assessment 0-10  -AS Clark-King FACES  -SC Clark-Baker FACES  -SC    Clark-King FACES Pain Rating  6  -SC 0  -SC    Pain Score 7  -AS 6  -SC     Post Pain Score 7  -AS 7  -SC 5  -SC    Pain Type Acute pain  -AS      Pain Location Pelvis  -AS Pelvis  -SC  Pelvis  -SC    Pain Orientation Right  -AS Right  -SC Right  -SC    Pain Intervention(s) Repositioned;Ambulation/increased activity  -AS Repositioned  -SC Repositioned  -SC    Response to Interventions tolerated  -AS  improved  -SC    Recorded by [AS] Wendy Greco PTA [SC] Emily Ohara, PT [SC] Emily Ohara, PT    Pain 2    Clark-Baker FACES Pain Rating 2  0  -SC     Post Tx Pain Score 2  5  -SC     Pain Type 2  Acute pain  -SC     Pain Location 2  Abdomen  -SC     Pain Intervention(s) 2  --   [place] [facility] [place]  -SC     Recorded by  [SC] Emily Ohara PT     Cognitive Assessment/Intervention    Current Cognitive/Communication Assessment functional  -AS functional  -SC functional  -SC    Orientation Status oriented x 4  -AS oriented x 4  -SC oriented x 4  -SC    Follows Commands/Answers Questions 100% of the time;able to follow single-step instructions  -% of the time  -% of the time  -SC    Personal Safety WNL/WFL  -AS WNL/WFL  -SC WNL/WFL  -SC    Personal Safety Interventions fall prevention program maintained;gait belt;nonskid shoes/slippers when out of bed;other (see comments)   exit alarm  -AS gait belt;fall prevention program maintained  -SC fall prevention program maintained;gait belt  -SC    Recorded by [AS] Wendy Greco PTA [SC] Emily Ohara, PT [SC] Emily Ohara, PT    Mobility Assessment/Training    Extremity Weight-Bearing Status  right upper extremity  -SC right lower extremity  -SC    Right Upper Extremity Weight-Bearing  weight-bearing as tolerated  -SC weight-bearing as tolerated  -SC    Recorded by  [SC] Emily Ohara, PT [SC] Emily Ohara, PT    Bed Mobility, Assessment/Treatment    Bed Mobility, Assistive Device  bed rails;head of bed elevated  -SC     Bed Mob, Supine to Sit, [place] verbal cues required;moderate assist (50% patient effort);2 person assist required  -AS minimum assist (75% patient effort);verbal cues required  -SC     Bed Mob, Sit  to Supine, [place] verbal cues required;maximum assist (25% patient effort);2 person assist required  -AS      Bed Mobility, Impairments  pain;motor control impaired  -SC     Bed Mobility, Comment patient having increased complaitns of pain requiring more assistance to perform transfer  -AS  uic  -SC    Recorded by [AS] Wendy Greco PTA [SC] Shearon A Mayda, PT [SC] Shearon A Mayda, PT    Transfer Assessment/Treatment    Transfers, Sit-Stand [place] verbal cues required;contact guard assist;2 person assist required  -AS supervision required  -SC supervision required;verbal cues required  -SC    Transfers, Stand-Sit [place] verbal cues required;contact guard assist;2 person assist required  -AS supervision required;verbal cues required  -SC supervision required;verbal cues required  -SC    Transfers, Sit-Stand-Sit, Assist Device rolling walker  -AS rolling walker  -SC rolling walker  -SC    Transfer, Impairments strength decreased;pain  -AS pain;motor control impaired  -SC pain;motor control impaired  -SC    Transfer, Comment verbal cues for hand placement  -AS demonstrating good technique  -SC demonstrates  good technique with cueing  -SC    Recorded by [AS] Wendy Greco, GAYLE [SC] Shearon A Mayda, PT [SC] Shearon A Mayda, PT    Gait Assessment/Treatment    Gait, [place] Level verbal cues required;minimum assist (75% patient effort)  -AS contact guard assist  -SC minimum assist (75% patient effort)  -SC    Gait, Assistive Device rolling walker  -AS rolling walker  -SC rolling walker  -SC    Gait, Distance (Feet) 10  -AS 7  -SC 7  -SC    Gait, Gait Pattern Analysis  swing-to gait  -SC swing-to gait  -SC    Gait, Gait Deviations right:;antalgic;stephanie decreased;step length decreased  -AS right:;antalgic;weight-shifting ability decreased  -SC right:;antalgic;stephanie decreased;weight-shifting ability decreased  -SC    Gait, Impairments strength decreased;pain  -AS pain  -SC pain;motor  control impaired  -SC    Gait, Comment patient took 5 steps forward and 5 steps backward, increased pain limiting tolerance towards mobility  -AS able to advance leg independently. Limited by c/o nausea and abdominal pain.   -SC repeted cues needed for sequencing. Initially needed assist to advance r leg. Walker slightly tall for patient. I have called casemanager about this  -SC    Recorded by [AS] Wendy Greco PTA [SC] Emily A Mayda, PT [SC] Emily A Mayda, PT    Therapy Exercises    Bilateral Lower Extremities AROM:;10 reps;supine;ankle pumps/circles;glut sets;heel slides;quad sets   limited heel slides due to pain  -AS  AROM:;15 reps;sitting;ankle pumps/circles;glut sets;quad sets;LAQ  -SC    Recorded by [AS] Wendy Grceo PTA  [SC] Emily JOHNSON Mayda, PT    Positioning and Restraints    Pre-Treatment Position in bed  -AS in bed  -SC sitting in chair/recliner  -SC    Post Treatment Position bed  -AS bsc  -SC chair  -SC    In Bed supine;call light within reach;encouraged to call for assist;exit alarm on  -AS sitting;call light within reach;encouraged to call for assist;notified nsg  -SC notified nsg;sitting;sitting EOB;call light within reach;encouraged to call for assist;exit alarm on  -SC    Recorded by [AS] Wendy Greco PTA [SC] Emily A Mayda, PT [SC] Emily A Mayda, PT      User Key  (r) = Recorded By, (t) = Taken By, (c) = Cosigned By    Initials Name Effective Dates    SC Emily Ohara, PT 06/19/15 -     AS Wendy Greco PTA 06/22/15 -                 IP PT Goals       01/13/18 1107 01/11/18 1321 01/09/18 1704    Bed Mobility PT LTG    Bed Mobility PT LTG, Date Established   01/09/18  -SC    Bed Mobility PT LTG, Time to Achieve   5 - 7 days  -SC    Bed Mobility PT LTG, Activity Type   supine to sit/sit to supine;sidelying to sit/sit to sidelying  -SC    Bed Mobility PT LTG, [place] Level   conditional independence  -SC    Bed Mobility PT Goal  LTG, Assist Device   bed rails   -SC    Bed Mobility PT LTG, Date Goal Reviewed 01/13/18  -AS      Bed Mobility PT LTG, Outcome goal ongoing  -AS  goal ongoing  -SC    Transfer Training PT LTG    Transfer Training PT LTG, Date Established   01/09/18  -SC    Transfer Training PT LTG, Time to Achieve   5 - 7 days  -SC    Transfer Training PT LTG, Activity Type   sit to stand/stand to sit  -SC    Transfer Training PT LTG, [place] Level   contact guard assist  -SC    Transfer Training PT LTG, Assist Device   walker, rolling  -SC    Transfer Training PT LTG, Outcome  goal met  -SC goal ongoing  -SC    Gait Training PT LTG    Gait Training Goal PT LTG, Date Established   01/09/18  -SC    Gait Training Goal PT LTG, Time to Achieve   5 - 7 days  -SC    Gait Training Goal PT LTG, [place] Level   contact guard assist  -SC    Gait Training Goal PT LTG, Assist Device   walker, rolling  -SC    Gait Training Goal PT LTG, Distance to Achieve   75  -SC    Gait Training Goal PT LTG, Date Goal Reviewed 01/13/18  -AS      Gait Training Goal PT LTG, Outcome goal ongoing  -AS  goal ongoing  -SC      User Key  (r) = Recorded By, (t) = Taken By, (c) = Cosigned By    Initials Name Provider Type    SC Emily Ohara, PT Physical Therapist    AS Wendy Greco, PTA Physical Therapy Assistant          Physical Therapy Education     Title: PT OT SLP Therapies (Active)     Topic: Physical Therapy (Active)     Point: Mobility training (Active)    Learning Progress Summary    Learner Readiness Method Response Comment Documented by Status   Patient Acceptance E NR  AS 01/13/18 1107 Active    SHELLY Harrison reviewwed benefits of activity SC 01/12/18 1714 Done    SHELLY Harrison reviewed safety with mobility SC 01/11/18 1320 Done    Acceptance E SHELLY   01/11/18 0106 Done    YUMIKO Carrillo NR reviewed benefits of activity SC 01/10/18 1431 Done    TEN Godinez DU, NR reviewed benefits of activity SC 01/09/18 1704 Done               Point: Home exercise program (Active)     Learning Progress Summary    Learner Readiness Method Response Comment Documented by Status   Patient Acceptance E NR  AS 01/13/18 1107 Active    SHELLY Harrison reviewwed benefits of activity SC 01/12/18 1714 Done    SHELLY Harrison reviewed safety with mobility SC 01/11/18 1320 Done    Acceptance E VU   01/11/18 0106 Done    YUMIKO Carrillo,NR reviewed benefits of activity SC 01/10/18 1431 Done    TEN Godinez DU,NR reviewed benefits of activity SC 01/09/18 1704 Done               Point: Body mechanics (Active)    Learning Progress Summary    Learner Readiness Method Response Comment Documented by Status   Patient Acceptance E NR  AS 01/13/18 1107 Active    SHELLY Harrison reviewwed benefits of activity SC 01/12/18 1714 Done    SHELLY Harrison reviewed safety with mobility SC 01/11/18 1320 Done    Acceptance E VU   01/11/18 0106 Done    YUMIKO Carrillo,NR reviewed benefits of activity SC 01/10/18 1431 Done    TEN Godinez DU,NR reviewed benefits of activity SC 01/09/18 1704 Done               Point: Precautions (Active)    Learning Progress Summary    Learner Readiness Method Response Comment Documented by Status   Patient Acceptance E NR  AS 01/13/18 1107 Active    SHELLY Harrison reviewwed benefits of activity SC 01/12/18 1714 Done    SHELLY Harrison reviewed safety with mobility SC 01/11/18 1320 Done    Acceptance E VU   01/11/18 0106 Done    YUMIKO Carrillo,NR reviewed benefits of activity SC 01/10/18 1431 Done    TEN Godinez DU,NR reviewed benefits of activity SC 01/09/18 1704 Done                      User Key     Initials Effective Dates Name Provider Type Discipline    SC 06/19/15 -  Emily Ohara, PT Physical Therapist PT    AS 06/22/15 -  Wendy Greco PTA Physical Therapy Assistant PT     09/29/17 -  Wendy Mccullough, RN Registered Nurse Nurse                    PT Recommendation and Plan  Anticipated Equipment Needs At Discharge: front wheeled walker, bedside commode  Anticipated Discharge Disposition:  home with home health  Planned Therapy Interventions: gait training, bed mobility training, balance training, patient/family education, strengthening, transfer training  PT Frequency: daily  Plan of Care Review  Plan Of Care Reviewed With: patient  Progress: progress toward functional goals as expected  Outcome Summary/Follow up Plan: patient with increased right pelivc pain increasing assist needed for bed mobility and transfers. Gait distance also limited. Possible discharge to [place] today?          Outcome Measures       01/13/18 1036 01/12/18 1430 01/11/18 1030    How much help from another person do you currently need...    Turning from your back to your side while in flat bed without using bedrails? 2  -AS 3  -SC 2  -SC    Moving from lying on back to sitting on the side of a flat bed without bedrails? 2  -AS 3  -SC 2  -SC    Moving to and from a bed to a chair (including a wheelchair)? 3  -AS 3  -SC 2  -SC    Standing up from a chair using your arms (e.g., wheelchair, bedside chair)? 3  -AS 3  -SC 3  -SC    Climbing 3-5 steps with a railing? 1  -AS 1  -SC 1  -SC    To walk in hospital room? 3  -AS 3  -SC 3  -SC    AM-PAC 6 Clicks Score 14  -AS 16  -SC 13  -SC    Functional Assessment    Outcome Measure Options AM-PAC 6 Clicks Basic Mobility (PT)  -AS AM-PAC 6 Clicks Basic Mobility (PT)  -SC AM-PAC 6 Clicks Basic Mobility (PT)  -SC      User Key  (r) = Recorded By, (t) = Taken By, (c) = Cosigned By    Initials Name Provider Type    ELVIA Ohara, PT Physical Therapist    AS Wendy Greco, PTA Physical Therapy Assistant           Time Calculation:         PT Charges       01/13/18 1109          Time Calculation    Start Time 1036  -AS      PT Received On 01/13/18  -AS      PT Goal Re-Cert Due Date 01/19/18  -AS      Time Calculation- PT    Total Timed Code Minutes- PT 23 minute(s)  -AS        User Key  (r) = Recorded By, (t) = Taken By, (c) = Cosigned By    Initials Name Provider Type    AS Wendy  Agnes Greco PTA Physical Therapy Assistant          Therapy Charges for Today     Code Description Service Date Service Provider Modifiers Qty    63738608115 HC GAIT TRAINING EA 15 MIN 1/13/2018 Wendy Greco PTA GP 1    85720924004 HC PT THER PROC EA 15 MIN 1/13/2018 Wendy Greco PTA GP 1    27025947409 HC PT THER SUPP EA 15 MIN 1/13/2018 Wendy Greco PTA GP 2          PT G-Codes  Outcome Measure Options: AM-PAC 6 Clicks Basic Mobility (PT)    Wendy Greco PTA  1/13/2018

## 2018-01-13 NOTE — PLAN OF CARE
Problem: Pressure Ulcer Risk (Ed Scale) (Adult,Obstetrics,Pediatric)  Goal: Skin Integrity  Outcome: Ongoing (interventions implemented as appropriate)   01/13/18 0546   Pressure Ulcer Risk (Ed Scale) (Adult,Obstetrics,Pediatric)   Skin Integrity making progress toward outcome

## 2018-01-13 NOTE — PLAN OF CARE
Problem: Fall Risk (Adult)  Goal: Identify Related Risk Factors and Signs and Symptoms  Outcome: Ongoing (interventions implemented as appropriate)

## 2018-03-27 ENCOUNTER — HOSPITAL ENCOUNTER (EMERGENCY)
Facility: HOSPITAL | Age: 73
Discharge: HOME | End: 2018-03-27
Attending: EMERGENCY MEDICINE | Admitting: EMERGENCY MEDICINE

## 2018-03-27 ENCOUNTER — APPOINTMENT (OUTPATIENT)
Dept: GENERAL RADIOLOGY | Facility: HOSPITAL | Age: 73
End: 2018-03-27

## 2018-03-27 VITALS
RESPIRATION RATE: 18 BRPM | BODY MASS INDEX: 30.2 KG/M2 | WEIGHT: 140 LBS | OXYGEN SATURATION: 94 % | TEMPERATURE: 98 F | SYSTOLIC BLOOD PRESSURE: 150 MMHG | DIASTOLIC BLOOD PRESSURE: 70 MMHG | HEART RATE: 70 BPM | HEIGHT: 57 IN

## 2018-03-27 DIAGNOSIS — S52.602A DISPLACED FRACTURE OF DISTAL END OF LEFT ULNA: Primary | ICD-10-CM

## 2018-03-27 PROCEDURE — 73110 X-RAY EXAM OF WRIST: CPT

## 2018-03-27 PROCEDURE — 99283 EMERGENCY DEPT VISIT LOW MDM: CPT

## 2018-03-27 RX ORDER — HYDROCODONE BITARTRATE AND ACETAMINOPHEN 5; 325 MG/1; MG/1
1 TABLET ORAL ONCE
Status: COMPLETED | OUTPATIENT
Start: 2018-03-27 | End: 2018-03-27

## 2018-03-27 RX ORDER — HYDROCODONE BITARTRATE AND ACETAMINOPHEN 5; 325 MG/1; MG/1
1 TABLET ORAL EVERY 6 HOURS PRN
Qty: 12 TABLET | Refills: 0 | Status: SHIPPED | OUTPATIENT
Start: 2018-03-27 | End: 2018-04-19 | Stop reason: SDUPTHER

## 2018-03-27 RX ADMIN — HYDROCODONE BITARTRATE AND ACETAMINOPHEN 1 TABLET: 5; 325 TABLET ORAL at 08:34

## 2018-03-27 NOTE — ED PROVIDER NOTES
Subjective   Ms. Stefanie Russell is a 73 y.o. female who presents to the ED after a fall. She had lost her balance about 2 weeks ago, fell, and landed on her left wrist. She did have pain at that time, but managed it with ibuprofen. Since then, her pain has not improved and is concerned that this may be a fracture rather than a simple sprain. She denies any LOC, CP, SoA, abdominal pain, N/V/D, or any other acute sx or injuries at this time. No anticoagulation medications are noted.         History provided by:  Patient  Fall   Mechanism of injury: fall    Injury location:  Hand  Hand injury location:  L wrist  Incident location:  Home  Time since incident:  14 days  Arrived directly from scene: no    Fall:     Fall occurred:  Walking    Impact surface:  Unable to specify    Point of impact:  Hands  Suspicion of alcohol use: no    Suspicion of drug use: no    Current pain details:     Pain quality:  Unable to specify    Pain Severity:  Moderate    Pain timing:  Constant  Associated symptoms: no abdominal pain, no back pain, no chest pain, no headaches and no neck pain    Risk factors: no anticoagulation therapy        Review of Systems   Constitutional: Negative for chills and fever.   Cardiovascular: Negative for chest pain.   Gastrointestinal: Negative for abdominal pain.   Genitourinary: Negative for flank pain.   Musculoskeletal: Positive for arthralgias (Left wrist). Negative for back pain, joint swelling (Left wrist), myalgias and neck pain.   Neurological: Negative for syncope and headaches.   All other systems reviewed and are negative.      Past Medical History:   Diagnosis Date   • High cholesterol    • History of pelvic fracture 1/8/2018   • Hyperlipidemia    • Hypertension    • Osteoporosis        No Known Allergies    Past Surgical History:   Procedure Laterality Date   • COLON SURGERY     • HYSTERECTOMY     • SHOULDER SURGERY      rotator cuff (left)   • TONSILLECTOMY         Family History   Problem  Relation Age of Onset   • Lymphoma Mother    • Melanoma Mother    • Stroke Father    • Heart disease Father    • Heart failure Father        Social History     Social History   • Marital status:      Social History Main Topics   • Smoking status: Never Smoker   • Alcohol use No   • Drug use: No   • Sexual activity: Defer     Other Topics Concern   • Not on file         Objective   Physical Exam   Constitutional: She is oriented to person, place, and time. She appears well-developed and well-nourished. No distress.   HENT:   Head: Normocephalic and atraumatic.   Nose: Nose normal.   Eyes: Conjunctivae are normal. No scleral icterus.   Neck: Normal range of motion. Neck supple.   Cardiovascular: Intact distal pulses.    Pulmonary/Chest: Effort normal. No respiratory distress.   Abdominal: Soft. There is no tenderness.   Musculoskeletal: Normal range of motion. She exhibits edema and tenderness.   Left distal forearm with swelling and tenderness. No hand TTP, or rest of LUE. Normal senation in hand.   Neurological: She is alert and oriented to person, place, and time.   Skin: Skin is warm and dry. She is not diaphoretic.   Psychiatric: She has a normal mood and affect. Her behavior is normal.   Nursing note and vitals reviewed.      Splint - Cast - Strapping  Date/Time: 3/27/2018 10:14 AM  Performed by: HARSH LI  Authorized by: HARSH LI     Consent:     Consent obtained:  Verbal    Consent given by:  Patient    Risks discussed:  Pain    Alternatives discussed:  No treatment and observation  Pre-procedure details:     Sensation:  Normal    Skin color:  Normal  Procedure details:     Laterality:  Left    Location:  Wrist    Wrist:  L wrist    Strapping: no      Cast type:  Short arm    Splint type:  Ulnar gutter    Supplies:  Plaster  Post-procedure details:     Pain:  Improved    Sensation:  Normal    Skin color:  Normal    Patient tolerance of procedure:  Tolerated well, no immediate  "complications             ED Course  ED Course     No results found for this or any previous visit (from the past 24 hour(s)).  Note: In addition to lab results from this visit, the labs listed above may include labs taken at another facility or during a different encounter within the last 24 hours. Please correlate lab times with ED admission and discharge times for further clarification of the services performed during this visit.    XR Wrist 3+ View Left    (Results Pending)     Vitals:    03/27/18 0830   BP: 150/70   Patient Position: Sitting   Pulse: 70   Resp: 18   Temp: 98 °F (36.7 °C)   TempSrc: Oral   SpO2: 94%   Weight: 63.5 kg (140 lb)   Height: 144.8 cm (57\")     Medications   HYDROcodone-acetaminophen (NORCO) 5-325 MG per tablet 1 tablet (1 tablet Oral Given 3/27/18 0834)     ECG/EMG Results (last 24 hours)     ** No results found for the last 24 hours. **                        MDM  Number of Diagnoses or Management Options  Displaced fracture of distal end of left ulna: new and requires workup     Amount and/or Complexity of Data Reviewed  Tests in the radiology section of CPT®: ordered and reviewed  Review and summarize past medical records: yes  Independent visualization of images, tracings, or specimens: yes    Patient Progress  Patient progress: stable      Final diagnoses:   Displaced fracture of distal end of left ulna       Documentation assistance provided by ofelia THORPE.  Information recorded by the ofelia was done at my direction and has been verified and validated by me.     Ciara Thorpe  03/27/18 0831       Zabrina Crisostomo MD  03/27/18 1016    "

## 2018-03-27 NOTE — DISCHARGE INSTRUCTIONS
Follow up with Orthopedics next available. Please call to make an appointment.     Immediately return to the emergency department for any new or worsening symptoms.

## 2018-03-29 ENCOUNTER — OFFICE VISIT (OUTPATIENT)
Dept: ORTHOPEDIC SURGERY | Facility: CLINIC | Age: 73
End: 2018-03-29

## 2018-03-29 VITALS
DIASTOLIC BLOOD PRESSURE: 67 MMHG | HEART RATE: 76 BPM | HEIGHT: 57 IN | BODY MASS INDEX: 30.2 KG/M2 | SYSTOLIC BLOOD PRESSURE: 138 MMHG | WEIGHT: 139.99 LBS

## 2018-03-29 DIAGNOSIS — S52.202A CLOSED FRACTURE OF SHAFT OF LEFT ULNA, INITIAL ENCOUNTER: Primary | ICD-10-CM
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
## 2018-03-29 PROCEDURE — 29125 APPL SHORT ARM SPLINT STATIC: CPT | Performed by: ORTHOPAEDIC SURGERY

## 2018-03-29 PROCEDURE — 99203 OFFICE O/P NEW LOW 30 MIN: CPT | Performed by: ORTHOPAEDIC SURGERY

## 2018-03-29 NOTE — PROGRESS NOTES
[facility] [place] [facility] [place]    Subjective     Pain of the Left Wrist (Pain after a fall at home 2 weeks ago. She thought she sprained it. Went to [facility] 3/27/18 where they told her she fractured her wrist. Pain today is a 7/10.)      CASE    Stefanie Russell is a 73 y.o. female. Patient comes to the office today for a new problem.  She fell at home 2 weeks ago and went to the ER for persistent pain.  She has had difficulty using the wrist.  She rates the pain as 7 out of 10.  She has been falling recently and fractured her pelvis.  She is right-hand-dominant    Past Medical History:   Diagnosis Date   • High cholesterol    • History of pelvic fracture 1/8/2018   • Hyperlipidemia    • Hypertension    • Osteoarthritis    • Osteoporosis       Past Surgical History:   Procedure Laterality Date   • COLON SURGERY     • HYSTERECTOMY     • SHOULDER SURGERY      rotator cuff (left)   • TONSILLECTOMY        Family History   Problem Relation Age of Onset   • Lymphoma Mother    • Melanoma Mother    • Cancer Mother    • Osteoarthritis Mother    • Hypertension Mother    • Stroke Father    • Heart disease Father    • Heart failure Father    • Hypertension Father    • Heart attack Father      Social History     Social History   • Marital status:      Spouse name: N/A   • Number of children: N/A   • Years of education: N/A     Occupational History   • Not on file.     Social History Main Topics   • Smoking status: Never Smoker   • Smokeless tobacco: Never Used   • Alcohol use No   • Drug use: No   • Sexual activity: Defer     Other Topics Concern   • Not on file     Social History Narrative   • No narrative on file      Current Outpatient Prescriptions on File Prior to Visit   Medication Sig Dispense Refill   • atenolol (TENORMIN) 25 MG tablet Take 25 mg by mouth Daily.     • calcium citrate-vitamin d (CITRACAL) 200-250 MG-UNIT tablet tablet Take  by mouth 2 (Two) Times a Day.     • HYDROcodone-acetaminophen  "(NORCO) 5-325 MG per tablet Take 1 tablet by mouth Every 6 (Six) Hours As Needed for Severe Pain  for up to 12 doses. 12 tablet 0   • lisinopril (PRINIVIL,ZESTRIL) 20 MG tablet Take 20 mg by mouth Daily.     • lovastatin (MEVACOR) 20 MG tablet Take 20 mg by mouth Every Night.     • venlafaxine (EFFEXOR) 75 MG tablet Take 75 mg by mouth Daily.       No current facility-administered medications on file prior to visit.       No Known Allergies     The following portions of the patient's history were reviewed and updated as appropriate: allergies, current medications, past family history, past medical history, past social history, past surgical history and problem list.    Review of Systems   Constitutional: Positive for fatigue.   HENT: Negative.    Eyes: Negative.    Respiratory: Negative.    Cardiovascular: Negative.    Gastrointestinal: Negative.    Endocrine: Negative.    Genitourinary: Negative.    Musculoskeletal: Positive for arthralgias and back pain.   Skin: Negative.    Allergic/Immunologic: Negative.    Neurological: Negative.    Hematological: Bruises/bleeds easily.   Psychiatric/Behavioral: The patient is nervous/anxious.         Objective      Physical Exam  /67   Pulse 76   Ht 144.8 cm (57.01\")   Wt 63.5 kg (139 lb 15.9 oz)   BMI 30.29 kg/m²     Body mass index is 30.29 kg/m².    General  Mental Status - alert  General Appearance - cooperative, well groomed, not in acute distress  Orientation - Oriented X3  Build & Nutrition - well developed and well nourished  Posture - normal posture  Gait - normal gait     Integumentary  Global Assessment  Examination of related systems reveals - no lymphadenopathy  General Characteristics  Overall examination of the patient's skin reveals - no rashes, no evidence of scars, no suspicious lesions and no bruises.  Color - normal coloration of skin.    Ortho Exam  Peripheral Vascular   Left Upper Extremity    No cyanotic nail beds    Pink nail beds and rapid " capillary refill   Palpation    Radial Pulse - Bilaterally normal    Musculoskeletal   Left Upper Extremity   Radius:    Inspection and Palpation:    Tenderness -Moderately tender and about the wrist    Swelling - present    Effusion - none    Muscle tone - no atrophy    Pulses - +2   Deformities/Malalignments/Discrepancies    Normal bony contour    There is a documented closed fracture : location - left - distal end        Imaging/Studies  Imaging Results (last 24 hours)     ** No results found for the last 24 hours. **      We have reviewed x-rays of her left wrist taken at the ER.  Patient has a significantly displaced distal ulna shaft fracture.    Assessment:  1. Closed fracture of shaft of left ulna, initial encounter    2. Fall at home, initial encounter        Plan:  1. Continue over-the-counter medication as needed for discomfort  2. Patient will be placed in a volar short arm fiberglass splint  3. We've a long discussion today about treatment options and alternatives and because of the significant displacement and high risk of nonunion, we have agreed to proceed with open reduction and internal fixation of the distal ulna shaft.  The risks, benefits, potential hazards, typical recovery and rehabilitation as well as reasonable alternatives were discussed with the patient and her sister who accompanies her today.  We will get this scheduled as an outpatient and try to get it done tomorrow.      Medical Decision Making  Management Options : over-the-counter medicine and major surgery with risk factors  Data/Risk: radiology tests and independent visualization of imaging, lab tests, or EMG/NCV    Juan Lauren MD  03/29/18  9:17 AM

## 2018-03-30 ENCOUNTER — OUTSIDE FACILITY SERVICE (OUTPATIENT)
Dept: ORTHOPEDIC SURGERY | Facility: CLINIC | Age: 73
End: 2018-03-30

## 2018-03-30 PROCEDURE — 25545 OPTX ULNAR SHFT FX INT FIXJ: CPT | Performed by: ORTHOPAEDIC SURGERY

## 2018-04-12 ENCOUNTER — OFFICE VISIT (OUTPATIENT)
Dept: ORTHOPEDIC SURGERY | Facility: CLINIC | Age: 73
End: 2018-04-12

## 2018-04-12 DIAGNOSIS — S52.692D OTHER CLOSED FRACTURE OF DISTAL END OF LEFT ULNA WITH ROUTINE HEALING, SUBSEQUENT ENCOUNTER: ICD-10-CM

## 2018-04-12 DIAGNOSIS — Z98.890 S/P MUSCULOSKELETAL SYSTEM SURGERY: Primary | ICD-10-CM

## 2018-04-12 PROCEDURE — 99024 POSTOP FOLLOW-UP VISIT: CPT | Performed by: PHYSICIAN ASSISTANT

## 2018-04-12 NOTE — PROGRESS NOTES
[facility] [place] [facility] [place]    Subjective     Post-op (2 weeks s/p ORIF (L) Ulna Shaft 3/30/18)       CASE Russell is a 73 y.o. female. Patient presents today for her two-week follow-up status post open reduction internal fixation to left distal ulna.  Patient has no new complaints or issues.  She denies any fever, chills, night sweats or constitutional symptoms.  She also denies any numbness or tingling into the distal extremity.  She rates pain is under good control and requires none to minimal use of her opioid.         Objective      Physical Exam  There were no vitals taken for this visit.    There is no height or weight on file to calculate BMI.        Ortho Exam  Left wrist  Skin: Surgical incision site is healing well.  Sutures are in place.  No evidence of redness, warmth, drainage, swelling or infection noted.  Tenderness: Directly over the surgical site.  Motor/sensory: Grossly intact C4-T1    Imaging  Ordered 3 views of the left wrist.  Images reviewed Dr. Lauren.  Fracture the distal ulna is healing appropriately.  No evidence of hardware complication, failure or loosening noted.  Soft tissue structures are unremarkable.  Joint spaces are preserved.  Alignment is acceptable.  He chart for official report.    Assessment:  1. S/P musculoskeletal system surgery    2. Other closed fracture of distal end of left ulna with routine healing, subsequent encounter        Plan:  1. Discussion was had patient regarding exam, imaging, assessment and treatment options.  2. Patient was placed in a Exos wrist splint today.  3. She was taught gentle range of motion exercises for her wrist and digits.  4. She is to remain nonweightbearing to the left upper extremity.  5. Continue use of pain medication as needed if the narcotic is not required then she can drop down and use acetaminophen as needed.  6. Sutures were removed today and replaced with Steri-Strips.  7. Follow-up in 3 weeks for  repeat evaluation to include pre-clinic imaging of the left wrist.  Pending images and exam at that visit, will consider the need for OT.  8. Questions and concerns were answered          Dee Solitario PA-C  04/12/18  1:58 PM

## 2018-04-19 DIAGNOSIS — Z98.890 S/P MUSCULOSKELETAL SYSTEM SURGERY: Primary | ICD-10-CM

## 2018-04-19 DIAGNOSIS — S52.692D OTHER CLOSED FRACTURE OF DISTAL END OF LEFT ULNA WITH ROUTINE HEALING, SUBSEQUENT ENCOUNTER: ICD-10-CM

## 2018-04-19 RX ORDER — HYDROCODONE BITARTRATE AND ACETAMINOPHEN 5; 325 MG/1; MG/1
1 TABLET ORAL EVERY 6 HOURS PRN
Qty: 30 TABLET | Refills: 0 | Status: SHIPPED | OUTPATIENT
Start: 2018-04-19 | End: 2022-02-26

## 2018-04-22 ENCOUNTER — HOSPITAL ENCOUNTER (INPATIENT)
Facility: HOSPITAL | Age: 73
LOS: 7 days | Discharge: HOME | End: 2018-04-30
Attending: EMERGENCY MEDICINE | Admitting: HOSPITALIST

## 2018-04-22 ENCOUNTER — APPOINTMENT (OUTPATIENT)
Dept: GENERAL RADIOLOGY | Facility: HOSPITAL | Age: 73
End: 2018-04-22

## 2018-04-22 DIAGNOSIS — J02.9 PHARYNGITIS, UNSPECIFIED ETIOLOGY: ICD-10-CM
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
## 2018-04-22 DIAGNOSIS — Z74.09 IMPAIRED MOBILITY AND ADLS: ICD-10-CM

## 2018-04-22 DIAGNOSIS — E87.6 HYPOKALEMIA: ICD-10-CM

## 2018-04-22 DIAGNOSIS — R65.10 SIRS (SYSTEMIC INFLAMMATORY RESPONSE SYNDROME) (HCC): Primary | ICD-10-CM

## 2018-04-22 DIAGNOSIS — Z74.09 IMPAIRED FUNCTIONAL MOBILITY, BALANCE, GAIT, AND ENDURANCE: ICD-10-CM

## 2018-04-22 DIAGNOSIS — R06.00 DYSPNEA, UNSPECIFIED TYPE: ICD-10-CM

## 2018-04-22 DIAGNOSIS — Z78.9 IMPAIRED MOBILITY AND ADLS: ICD-10-CM

## 2018-04-22 DIAGNOSIS — N17.9 AKI (ACUTE KIDNEY INJURY) (HCC): ICD-10-CM
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
## 2018-04-22 LAB
BNP SERPL-MCNC: 253 PG/ML (ref 0–100)
S PYO AG THROAT QL: NEGATIVE
TROPONIN I SERPL-MCNC: 0 NG/ML (ref 0–0.07)

## 2018-04-22 PROCEDURE — 71045 X-RAY EXAM CHEST 1 VIEW: CPT

## 2018-04-22 PROCEDURE — 85007 BL SMEAR W/DIFF WBC COUNT: CPT | Performed by: NURSE PRACTITIONER

## 2018-04-22 PROCEDURE — 93005 ELECTROCARDIOGRAM TRACING: CPT | Performed by: EMERGENCY MEDICINE

## 2018-04-22 PROCEDURE — 87880 STREP A ASSAY W/OPTIC: CPT | Performed by: NURSE PRACTITIONER

## 2018-04-22 PROCEDURE — 87147 CULTURE TYPE IMMUNOLOGIC: CPT | Performed by: NURSE PRACTITIONER

## 2018-04-22 PROCEDURE — 99285 EMERGENCY DEPT VISIT HI MDM: CPT

## 2018-04-22 PROCEDURE — P9612 CATHETERIZE FOR URINE SPEC: HCPCS

## 2018-04-22 PROCEDURE — 87081 CULTURE SCREEN ONLY: CPT | Performed by: NURSE PRACTITIONER

## 2018-04-22 PROCEDURE — 84145 PROCALCITONIN (PCT): CPT | Performed by: NURSE PRACTITIONER

## 2018-04-22 PROCEDURE — 83880 ASSAY OF NATRIURETIC PEPTIDE: CPT | Performed by: NURSE PRACTITIONER

## 2018-04-22 PROCEDURE — 83605 ASSAY OF LACTIC ACID: CPT | Performed by: NURSE PRACTITIONER

## 2018-04-22 PROCEDURE — 84484 ASSAY OF TROPONIN QUANT: CPT

## 2018-04-22 PROCEDURE — 85025 COMPLETE CBC W/AUTO DIFF WBC: CPT | Performed by: NURSE PRACTITIONER

## 2018-04-22 PROCEDURE — 25010000002 MORPHINE SULFATE (PF) 2 MG/ML SOLUTION: Performed by: NURSE PRACTITIONER

## 2018-04-22 PROCEDURE — 80053 COMPREHEN METABOLIC PANEL: CPT | Performed by: NURSE PRACTITIONER

## 2018-04-22 RX ORDER — SODIUM CHLORIDE 0.9 % (FLUSH) 0.9 %
10 SYRINGE (ML) INJECTION AS NEEDED
Status: DISCONTINUED | OUTPATIENT
Start: 2018-04-22 | End: 2018-04-30 | Stop reason: HOSPADM

## 2018-04-22 RX ORDER — MORPHINE SULFATE 2 MG/ML
2 INJECTION, SOLUTION INTRAMUSCULAR; INTRAVENOUS ONCE
Status: COMPLETED | OUTPATIENT
Start: 2018-04-22 | End: 2018-04-22

## 2018-04-22 RX ADMIN — MORPHINE SULFATE 2 MG: 10 INJECTION INTRAVENOUS at 23:51

## 2018-04-23 ENCOUNTER — APPOINTMENT (OUTPATIENT)
Dept: CT IMAGING | Facility: HOSPITAL | Age: 73
End: 2018-04-23

## 2018-04-23 ENCOUNTER — ANCILLARY PROCEDURE (OUTPATIENT)
Dept: SPEECH THERAPY | Facility: HOSPITAL | Age: 73
End: 2018-04-23
Attending: INTERNAL MEDICINE

## 2018-04-23 ENCOUNTER — DOCUMENTATION (OUTPATIENT)
Dept: ADMINISTRATIVE | Facility: HOSPITAL | Age: 73
End: 2018-04-23

## 2018-04-23 PROBLEM — E78.5 HYPERLIPIDEMIA: Status: ACTIVE | Noted: 2018-04-23

## 2018-04-23 PROBLEM — A41.9 SEPSIS (HCC): Status: ACTIVE | Noted: 2018-04-23

## 2018-04-23 PROBLEM — E87.20 LACTIC ACIDOSIS: Status: ACTIVE | Noted: 2018-04-23

## 2018-04-23 PROBLEM — I10 HYPERTENSION: Status: ACTIVE | Noted: 2018-04-23

## 2018-04-23 PROBLEM — R19.7 NAUSEA VOMITING AND DIARRHEA: Status: ACTIVE | Noted: 2018-04-23

## 2018-04-23 PROBLEM — R65.10 SIRS (SYSTEMIC INFLAMMATORY RESPONSE SYNDROME) (HCC): Status: ACTIVE | Noted: 2018-04-23

## 2018-04-23 PROBLEM — R06.03 RESPIRATORY DISTRESS: Status: ACTIVE | Noted: 2018-04-23

## 2018-04-23 PROBLEM — R13.10 DYSPHAGIA: Status: ACTIVE | Noted: 2018-04-23

## 2018-04-23 PROBLEM — R11.2 NAUSEA VOMITING AND DIARRHEA: Status: ACTIVE | Noted: 2018-04-23

## 2018-04-23 PROBLEM — N17.9 AKI (ACUTE KIDNEY INJURY): Status: ACTIVE | Noted: 2018-04-23

## 2018-04-23 LAB
ALBUMIN SERPL-MCNC: 3.7 G/DL (ref 3.2–4.8)
ALBUMIN SERPL-MCNC: 4.7 G/DL (ref 3.2–4.8)
ALBUMIN/GLOB SERPL: 1.3 G/DL (ref 1.5–2.5)
ALBUMIN/GLOB SERPL: 1.4 G/DL (ref 1.5–2.5)
ALP SERPL-CCNC: 119 U/L (ref 25–100)
ALP SERPL-CCNC: 61 U/L (ref 25–100)
ALT SERPL W P-5'-P-CCNC: 12 U/L (ref 7–40)
ALT SERPL W P-5'-P-CCNC: 14 U/L (ref 7–40)
ANION GAP SERPL CALCULATED.3IONS-SCNC: 14 MMOL/L (ref 3–11)
ANION GAP SERPL CALCULATED.3IONS-SCNC: 22 MMOL/L (ref 3–11)
AST SERPL-CCNC: 19 U/L (ref 0–33)
AST SERPL-CCNC: 19 U/L (ref 0–33)
BACTERIA BLD CULT: ABNORMAL
BACTERIA UR QL AUTO: ABNORMAL /HPF
BASOPHILS # BLD MANUAL: 0 10*3/MM3 (ref 0–0.2)
BASOPHILS NFR BLD AUTO: 0 % (ref 0–1)
BILIRUB SERPL-MCNC: 0.4 MG/DL (ref 0.3–1.2)
BILIRUB SERPL-MCNC: 0.9 MG/DL (ref 0.3–1.2)
BILIRUB UR QL STRIP: NEGATIVE
BUN BLD-MCNC: 26 MG/DL (ref 9–23)
BUN BLD-MCNC: 26 MG/DL (ref 9–23)
BUN/CREAT SERPL: 16.3 (ref 7–25)
BUN/CREAT SERPL: 20 (ref 7–25)
CA-I SERPL ISE-MCNC: 1.11 MMOL/L (ref 1.12–1.32)
CALCIUM SPEC-SCNC: 10.1 MG/DL (ref 8.7–10.4)
CALCIUM SPEC-SCNC: 8 MG/DL (ref 8.7–10.4)
CHLORIDE SERPL-SCNC: 108 MMOL/L (ref 99–109)
CHLORIDE SERPL-SCNC: 97 MMOL/L (ref 99–109)
CLARITY UR: ABNORMAL
CO2 SERPL-SCNC: 17 MMOL/L (ref 20–31)
CO2 SERPL-SCNC: 17 MMOL/L (ref 20–31)
COLOR UR: YELLOW
CREAT BLD-MCNC: 1.3 MG/DL (ref 0.6–1.3)
CREAT BLD-MCNC: 1.6 MG/DL (ref 0.6–1.3)
D-LACTATE SERPL-SCNC: 3.3 MMOL/L (ref 0.5–2)
D-LACTATE SERPL-SCNC: 5.7 MMOL/L (ref 0.5–2)
DEPRECATED RDW RBC AUTO: 41.9 FL (ref 37–54)
DEPRECATED RDW RBC AUTO: 45.1 FL (ref 37–54)
EOSINOPHIL # BLD MANUAL: 0 10*3/MM3 (ref 0.1–0.3)
EOSINOPHIL NFR BLD MANUAL: 0 % (ref 0–3)
ERYTHROCYTE [DISTWIDTH] IN BLOOD BY AUTOMATED COUNT: 13.1 % (ref 11.3–14.5)
ERYTHROCYTE [DISTWIDTH] IN BLOOD BY AUTOMATED COUNT: 13.8 % (ref 11.3–14.5)
GFR SERPL CREATININE-BSD FRML MDRD: 32 ML/MIN/1.73
GFR SERPL CREATININE-BSD FRML MDRD: 40 ML/MIN/1.73
GLOBULIN UR ELPH-MCNC: 2.7 GM/DL
GLOBULIN UR ELPH-MCNC: 3.6 GM/DL
GLUCOSE BLD-MCNC: 144 MG/DL (ref 70–100)
GLUCOSE BLD-MCNC: 197 MG/DL (ref 70–100)
GLUCOSE UR STRIP-MCNC: NEGATIVE MG/DL
HCT VFR BLD AUTO: 40.3 % (ref 34.5–44)
HCT VFR BLD AUTO: 45 % (ref 34.5–44)
HGB BLD-MCNC: 13.7 G/DL (ref 11.5–15.5)
HGB BLD-MCNC: 16 G/DL (ref 11.5–15.5)
HGB UR QL STRIP.AUTO: NEGATIVE
HOLD SPECIMEN: NORMAL
HYALINE CASTS UR QL AUTO: ABNORMAL /LPF
IMM GRANULOCYTES # BLD: 0 10*3/MM3 (ref 0–0.03)
IMM GRANULOCYTES NFR BLD: 0 % (ref 0–0.6)
KETONES UR QL STRIP: ABNORMAL
LEUKOCYTE ESTERASE UR QL STRIP.AUTO: ABNORMAL
LYMPHOCYTES # BLD MANUAL: 0 10*3/MM3 (ref 0.6–4.8)
LYMPHOCYTES NFR BLD MANUAL: 0 % (ref 24–44)
LYMPHOCYTES NFR BLD MANUAL: 7 % (ref 0–12)
MAGNESIUM SERPL-MCNC: 1.2 MG/DL (ref 1.3–2.7)
MCH RBC QN AUTO: 30.6 PG (ref 27–31)
MCH RBC QN AUTO: 31.3 PG (ref 27–31)
MCHC RBC AUTO-ENTMCNC: 34 G/DL (ref 32–36)
MCHC RBC AUTO-ENTMCNC: 35.6 G/DL (ref 32–36)
MCV RBC AUTO: 87.9 FL (ref 80–99)
MCV RBC AUTO: 90 FL (ref 80–99)
MONOCYTES # BLD AUTO: 1.1 10*3/MM3 (ref 0–1)
NEUTROPHILS # BLD AUTO: 14.58 10*3/MM3 (ref 1.5–8.3)
NEUTROPHILS NFR BLD MANUAL: 74 % (ref 41–71)
NEUTS BAND NFR BLD MANUAL: 19 % (ref 0–5)
NITRITE UR QL STRIP: NEGATIVE
NRBC BLD MANUAL-RTO: 0 /100 WBC (ref 0–0)
PH UR STRIP.AUTO: 5.5 [PH] (ref 5–8)
PHOSPHATE SERPL-MCNC: 2.4 MG/DL (ref 2.4–5.1)
PLAT MORPH BLD: NORMAL
PLATELET # BLD AUTO: 244 10*3/MM3 (ref 150–450)
PLATELET # BLD AUTO: 292 10*3/MM3 (ref 150–450)
PMV BLD AUTO: 10.7 FL (ref 6–12)
PMV BLD AUTO: 11 FL (ref 6–12)
POTASSIUM BLD-SCNC: 2.4 MMOL/L (ref 3.5–5.5)
POTASSIUM BLD-SCNC: 3.2 MMOL/L (ref 3.5–5.5)
POTASSIUM BLD-SCNC: 3.5 MMOL/L (ref 3.5–5.5)
PROCALCITONIN SERPL-MCNC: 5.37 NG/ML
PROT SERPL-MCNC: 6.4 G/DL (ref 5.7–8.2)
PROT SERPL-MCNC: 8.3 G/DL (ref 5.7–8.2)
PROT UR QL STRIP: ABNORMAL
RBC # BLD AUTO: 4.48 10*6/MM3 (ref 3.89–5.14)
RBC # BLD AUTO: 5.12 10*6/MM3 (ref 3.89–5.14)
RBC # UR: ABNORMAL /HPF
RBC MORPH BLD: NORMAL
REF LAB TEST METHOD: ABNORMAL
SODIUM BLD-SCNC: 136 MMOL/L (ref 132–146)
SODIUM BLD-SCNC: 139 MMOL/L (ref 132–146)
SP GR UR STRIP: 1.02 (ref 1–1.03)
SQUAMOUS #/AREA URNS HPF: ABNORMAL /HPF
TROPONIN I SERPL-MCNC: 0 NG/ML (ref 0–0.07)
UROBILINOGEN UR QL STRIP: ABNORMAL
VANCOMYCIN SERPL-MCNC: 24 MCG/ML (ref 5–40)
WBC MORPH BLD: NORMAL
WBC NRBC COR # BLD: 11.86 10*3/MM3 (ref 3.5–10.8)
WBC NRBC COR # BLD: 15.68 10*3/MM3 (ref 3.5–10.8)
WBC UR QL AUTO: ABNORMAL /HPF

## 2018-04-23 PROCEDURE — 25010000002 VANCOMYCIN 10 G RECONSTITUTED SOLUTION: Performed by: NURSE PRACTITIONER

## 2018-04-23 PROCEDURE — 25010000002 PIPERACILLIN SOD-TAZOBACTAM PER 1 G: Performed by: PHYSICIAN ASSISTANT

## 2018-04-23 PROCEDURE — 25010000002 DEXAMETHASONE: Performed by: EMERGENCY MEDICINE

## 2018-04-23 PROCEDURE — 85027 COMPLETE CBC AUTOMATED: CPT | Performed by: PHYSICIAN ASSISTANT

## 2018-04-23 PROCEDURE — 80053 COMPREHEN METABOLIC PANEL: CPT | Performed by: NURSE PRACTITIONER

## 2018-04-23 PROCEDURE — 93005 ELECTROCARDIOGRAM TRACING: CPT | Performed by: EMERGENCY MEDICINE

## 2018-04-23 PROCEDURE — 02HV33Z INSERTION OF INFUSION DEVICE INTO SUPERIOR VENA CAVA, PERCUTANEOUS APPROACH: ICD-10-PCS | Performed by: INTERNAL MEDICINE

## 2018-04-23 PROCEDURE — 84484 ASSAY OF TROPONIN QUANT: CPT

## 2018-04-23 PROCEDURE — 70490 CT SOFT TISSUE NECK W/O DYE: CPT

## 2018-04-23 PROCEDURE — 25010000002 PIPERACILLIN-TAZOBACTAM: Performed by: EMERGENCY MEDICINE

## 2018-04-23 PROCEDURE — 94640 AIRWAY INHALATION TREATMENT: CPT

## 2018-04-23 PROCEDURE — 83605 ASSAY OF LACTIC ACID: CPT | Performed by: NURSE PRACTITIONER

## 2018-04-23 PROCEDURE — 25010000002 DEXAMETHASONE: Performed by: NURSE PRACTITIONER

## 2018-04-23 PROCEDURE — 25010000003 POTASSIUM CHLORIDE 20 MEQ/250ML SOLUTION: Performed by: NURSE PRACTITIONER

## 2018-04-23 PROCEDURE — 83735 ASSAY OF MAGNESIUM: CPT | Performed by: NURSE PRACTITIONER

## 2018-04-23 PROCEDURE — 99223 1ST HOSP IP/OBS HIGH 75: CPT | Performed by: INTERNAL MEDICINE

## 2018-04-23 PROCEDURE — 25010000002 HEPARIN (PORCINE) PER 1000 UNITS: Performed by: PHYSICIAN ASSISTANT

## 2018-04-23 PROCEDURE — C1751 CATH, INF, PER/CENT/MIDLINE: HCPCS

## 2018-04-23 PROCEDURE — 92610 EVALUATE SWALLOWING FUNCTION: CPT

## 2018-04-23 PROCEDURE — 87186 SC STD MICRODIL/AGAR DIL: CPT | Performed by: NURSE PRACTITIONER

## 2018-04-23 PROCEDURE — 87040 BLOOD CULTURE FOR BACTERIA: CPT | Performed by: NURSE PRACTITIONER

## 2018-04-23 PROCEDURE — 84132 ASSAY OF SERUM POTASSIUM: CPT | Performed by: INTERNAL MEDICINE

## 2018-04-23 PROCEDURE — 94799 UNLISTED PULMONARY SVC/PX: CPT

## 2018-04-23 PROCEDURE — 82330 ASSAY OF CALCIUM: CPT | Performed by: NURSE PRACTITIONER

## 2018-04-23 PROCEDURE — 25010000002 MAGNESIUM SULFATE 2 GM/50ML SOLUTION: Performed by: NURSE PRACTITIONER

## 2018-04-23 PROCEDURE — 92526 ORAL FUNCTION THERAPY: CPT

## 2018-04-23 PROCEDURE — 84100 ASSAY OF PHOSPHORUS: CPT | Performed by: NURSE PRACTITIONER

## 2018-04-23 PROCEDURE — 87181 SC STD AGAR DILUTION PER AGT: CPT | Performed by: NURSE PRACTITIONER

## 2018-04-23 PROCEDURE — 74176 CT ABD & PELVIS W/O CONTRAST: CPT

## 2018-04-23 PROCEDURE — 87150 DNA/RNA AMPLIFIED PROBE: CPT | Performed by: NURSE PRACTITIONER

## 2018-04-23 PROCEDURE — 71250 CT THORAX DX C-: CPT

## 2018-04-23 PROCEDURE — 25010000002 POTASSIUM CHLORIDE PER 2 MEQ OF POTASSIUM: Performed by: NURSE PRACTITIONER

## 2018-04-23 PROCEDURE — 87086 URINE CULTURE/COLONY COUNT: CPT | Performed by: NURSE PRACTITIONER

## 2018-04-23 PROCEDURE — 87147 CULTURE TYPE IMMUNOLOGIC: CPT | Performed by: NURSE PRACTITIONER

## 2018-04-23 PROCEDURE — 99291 CRITICAL CARE FIRST HOUR: CPT | Performed by: INTERNAL MEDICINE

## 2018-04-23 PROCEDURE — 92612 ENDOSCOPY SWALLOW (FEES) VID: CPT

## 2018-04-23 PROCEDURE — C1894 INTRO/SHEATH, NON-LASER: HCPCS

## 2018-04-23 PROCEDURE — 80202 ASSAY OF VANCOMYCIN: CPT | Performed by: NURSE PRACTITIONER

## 2018-04-23 PROCEDURE — 81001 URINALYSIS AUTO W/SCOPE: CPT | Performed by: NURSE PRACTITIONER

## 2018-04-23 RX ORDER — LISINOPRIL AND HYDROCHLOROTHIAZIDE 25; 20 MG/1; MG/1
1 TABLET ORAL DAILY
COMMUNITY
End: 2018-04-30 | Stop reason: HOSPADM

## 2018-04-23 RX ORDER — ACETAMINOPHEN 325 MG/1
650 TABLET ORAL EVERY 4 HOURS PRN
Status: DISCONTINUED | OUTPATIENT
Start: 2018-04-23 | End: 2018-04-30 | Stop reason: HOSPADM

## 2018-04-23 RX ORDER — METHYLPREDNISOLONE SODIUM SUCCINATE 125 MG/2ML
125 INJECTION, POWDER, LYOPHILIZED, FOR SOLUTION INTRAMUSCULAR; INTRAVENOUS ONCE
Status: DISCONTINUED | OUTPATIENT
Start: 2018-04-23 | End: 2018-04-23

## 2018-04-23 RX ORDER — SODIUM CHLORIDE, SODIUM LACTATE, POTASSIUM CHLORIDE, CALCIUM CHLORIDE 600; 310; 30; 20 MG/100ML; MG/100ML; MG/100ML; MG/100ML
75 INJECTION, SOLUTION INTRAVENOUS CONTINUOUS
Status: DISCONTINUED | OUTPATIENT
Start: 2018-04-23 | End: 2018-04-25

## 2018-04-23 RX ORDER — POTASSIUM CHLORIDE 7.45 MG/ML
10 INJECTION INTRAVENOUS ONCE
Status: DISCONTINUED | OUTPATIENT
Start: 2018-04-23 | End: 2018-04-23 | Stop reason: RX

## 2018-04-23 RX ORDER — HYDROCODONE BITARTRATE AND ACETAMINOPHEN 5; 325 MG/1; MG/1
1 TABLET ORAL ONCE
Status: DISCONTINUED | OUTPATIENT
Start: 2018-04-23 | End: 2018-04-23

## 2018-04-23 RX ORDER — ATORVASTATIN CALCIUM 10 MG/1
10 TABLET, FILM COATED ORAL NIGHTLY
Status: DISCONTINUED | OUTPATIENT
Start: 2018-04-23 | End: 2018-04-30 | Stop reason: HOSPADM

## 2018-04-23 RX ORDER — SODIUM CHLORIDE 0.9 % (FLUSH) 0.9 %
1-10 SYRINGE (ML) INJECTION AS NEEDED
Status: DISCONTINUED | OUTPATIENT
Start: 2018-04-23 | End: 2018-04-30 | Stop reason: HOSPADM

## 2018-04-23 RX ORDER — MAGNESIUM SULFATE HEPTAHYDRATE 40 MG/ML
2 INJECTION, SOLUTION INTRAVENOUS AS NEEDED
Status: DISCONTINUED | OUTPATIENT
Start: 2018-04-23 | End: 2018-04-30 | Stop reason: HOSPADM

## 2018-04-23 RX ORDER — VENLAFAXINE 75 MG/1
150 TABLET ORAL DAILY
Status: DISCONTINUED | OUTPATIENT
Start: 2018-04-23 | End: 2018-04-23

## 2018-04-23 RX ORDER — POTASSIUM CHLORIDE 7.45 MG/ML
10 INJECTION INTRAVENOUS
Status: DISCONTINUED | OUTPATIENT
Start: 2018-04-23 | End: 2018-04-27

## 2018-04-23 RX ORDER — AMITRIPTYLINE HYDROCHLORIDE 25 MG/1
50 TABLET, FILM COATED ORAL NIGHTLY
COMMUNITY
End: 2020-12-28

## 2018-04-23 RX ORDER — POTASSIUM CHLORIDE 750 MG/1
40 CAPSULE, EXTENDED RELEASE ORAL AS NEEDED
Status: DISCONTINUED | OUTPATIENT
Start: 2018-04-23 | End: 2018-04-27

## 2018-04-23 RX ORDER — DULOXETIN HYDROCHLORIDE 30 MG/1
30 CAPSULE, DELAYED RELEASE ORAL DAILY
Status: DISCONTINUED | OUTPATIENT
Start: 2018-04-23 | End: 2018-04-30 | Stop reason: HOSPADM

## 2018-04-23 RX ORDER — VENLAFAXINE 37.5 MG/1
75 TABLET ORAL 2 TIMES DAILY WITH MEALS
Status: DISCONTINUED | OUTPATIENT
Start: 2018-04-24 | End: 2018-04-30 | Stop reason: HOSPADM

## 2018-04-23 RX ORDER — SODIUM CHLORIDE 9 MG/ML
125 INJECTION, SOLUTION INTRAVENOUS CONTINUOUS
Status: DISCONTINUED | OUTPATIENT
Start: 2018-04-23 | End: 2018-04-23

## 2018-04-23 RX ORDER — VENLAFAXINE HYDROCHLORIDE 150 MG/1
150 CAPSULE, EXTENDED RELEASE ORAL DAILY
COMMUNITY

## 2018-04-23 RX ORDER — HEPARIN SODIUM 5000 [USP'U]/ML
5000 INJECTION, SOLUTION INTRAVENOUS; SUBCUTANEOUS EVERY 8 HOURS SCHEDULED
Status: DISCONTINUED | OUTPATIENT
Start: 2018-04-23 | End: 2018-04-30 | Stop reason: HOSPADM

## 2018-04-23 RX ORDER — MAGNESIUM SULFATE HEPTAHYDRATE 40 MG/ML
4 INJECTION, SOLUTION INTRAVENOUS AS NEEDED
Status: DISCONTINUED | OUTPATIENT
Start: 2018-04-23 | End: 2018-04-30 | Stop reason: HOSPADM

## 2018-04-23 RX ORDER — IPRATROPIUM BROMIDE AND ALBUTEROL SULFATE 2.5; .5 MG/3ML; MG/3ML
3 SOLUTION RESPIRATORY (INHALATION)
Status: DISCONTINUED | OUTPATIENT
Start: 2018-04-23 | End: 2018-04-26

## 2018-04-23 RX ORDER — POTASSIUM CHLORIDE 1.5 G/1.77G
40 POWDER, FOR SOLUTION ORAL AS NEEDED
Status: DISCONTINUED | OUTPATIENT
Start: 2018-04-23 | End: 2018-04-27

## 2018-04-23 RX ADMIN — IPRATROPIUM BROMIDE AND ALBUTEROL SULFATE 3 ML: 2.5; .5 SOLUTION RESPIRATORY (INHALATION) at 20:19

## 2018-04-23 RX ADMIN — SODIUM CHLORIDE 1890 ML: 9 INJECTION, SOLUTION INTRAVENOUS at 00:20

## 2018-04-23 RX ADMIN — SODIUM CHLORIDE 1000 ML: 9 INJECTION, SOLUTION INTRAVENOUS at 09:19

## 2018-04-23 RX ADMIN — MAGNESIUM SULFATE HEPTAHYDRATE 2 G: 40 INJECTION, SOLUTION INTRAVENOUS at 15:17

## 2018-04-23 RX ADMIN — TAZOBACTAM SODIUM AND PIPERACILLIN SODIUM 3.38 G: 375; 3 INJECTION, SOLUTION INTRAVENOUS at 16:04

## 2018-04-23 RX ADMIN — SODIUM CHLORIDE, POTASSIUM CHLORIDE, SODIUM LACTATE AND CALCIUM CHLORIDE 100 ML/HR: 600; 310; 30; 20 INJECTION, SOLUTION INTRAVENOUS at 18:25

## 2018-04-23 RX ADMIN — DEXAMETHASONE SODIUM PHOSPHATE 10 MG: 10 INJECTION INTRAMUSCULAR; INTRAVENOUS at 06:06

## 2018-04-23 RX ADMIN — POTASSIUM CHLORIDE 20 MEQ: 149 INJECTION, SOLUTION, CONCENTRATE INTRAVENOUS at 14:53

## 2018-04-23 RX ADMIN — SODIUM CHLORIDE, POTASSIUM CHLORIDE, SODIUM LACTATE AND CALCIUM CHLORIDE 100 ML/HR: 600; 310; 30; 20 INJECTION, SOLUTION INTRAVENOUS at 08:23

## 2018-04-23 RX ADMIN — HEPARIN SODIUM 5000 UNITS: 5000 INJECTION, SOLUTION INTRAVENOUS; SUBCUTANEOUS at 14:03

## 2018-04-23 RX ADMIN — POTASSIUM CHLORIDE 20 MEQ: 149 INJECTION, SOLUTION, CONCENTRATE INTRAVENOUS at 22:03

## 2018-04-23 RX ADMIN — POTASSIUM CHLORIDE 20 MEQ: 149 INJECTION, SOLUTION, CONCENTRATE INTRAVENOUS at 12:31

## 2018-04-23 RX ADMIN — IPRATROPIUM BROMIDE AND ALBUTEROL SULFATE 3 ML: 2.5; .5 SOLUTION RESPIRATORY (INHALATION) at 15:56

## 2018-04-23 RX ADMIN — VANCOMYCIN HYDROCHLORIDE 1250 MG: 10 INJECTION, POWDER, LYOPHILIZED, FOR SOLUTION INTRAVENOUS at 02:21

## 2018-04-23 RX ADMIN — DEXAMETHASONE SODIUM PHOSPHATE 10 MG: 10 INJECTION INTRAMUSCULAR; INTRAVENOUS at 11:28

## 2018-04-23 RX ADMIN — HEPARIN SODIUM 5000 UNITS: 5000 INJECTION, SOLUTION INTRAVENOUS; SUBCUTANEOUS at 21:00

## 2018-04-23 RX ADMIN — MAGNESIUM SULFATE HEPTAHYDRATE 4 G: 40 INJECTION, SOLUTION INTRAVENOUS at 11:43

## 2018-04-23 RX ADMIN — DEXAMETHASONE SODIUM PHOSPHATE 10 MG: 10 INJECTION INTRAMUSCULAR; INTRAVENOUS at 18:25

## 2018-04-23 RX ADMIN — POTASSIUM CHLORIDE 20 MEQ: 149 INJECTION, SOLUTION, CONCENTRATE INTRAVENOUS at 02:24

## 2018-04-23 RX ADMIN — TAZOBACTAM SODIUM AND PIPERACILLIN SODIUM 4.5 G: .5; 4 INJECTION, POWDER, LYOPHILIZED, FOR SOLUTION INTRAVENOUS at 01:51

## 2018-04-23 RX ADMIN — TAZOBACTAM SODIUM AND PIPERACILLIN SODIUM 3.38 G: 375; 3 INJECTION, SOLUTION INTRAVENOUS at 08:43

## 2018-04-23 RX ADMIN — POTASSIUM CHLORIDE 20 MEQ: 149 INJECTION, SOLUTION, CONCENTRATE INTRAVENOUS at 00:20

## 2018-04-24 ENCOUNTER — APPOINTMENT (OUTPATIENT)
Dept: CT IMAGING | Facility: HOSPITAL | Age: 73
End: 2018-04-24

## 2018-04-24 ENCOUNTER — APPOINTMENT (OUTPATIENT)
Dept: GENERAL RADIOLOGY | Facility: HOSPITAL | Age: 73
End: 2018-04-24

## 2018-04-24 PROBLEM — R11.2 NAUSEA VOMITING AND DIARRHEA: Status: RESOLVED | Noted: 2018-04-23 | Resolved: 2018-04-24

## 2018-04-24 PROBLEM — E87.20 LACTIC ACIDOSIS: Status: RESOLVED | Noted: 2018-04-23 | Resolved: 2018-04-24

## 2018-04-24 PROBLEM — R19.7 NAUSEA VOMITING AND DIARRHEA: Status: RESOLVED | Noted: 2018-04-23 | Resolved: 2018-04-24

## 2018-04-24 PROBLEM — J02.0 ACUTE STREPTOCOCCAL PHARYNGITIS: Status: ACTIVE | Noted: 2018-04-24

## 2018-04-24 LAB
ALBUMIN SERPL-MCNC: 3.2 G/DL (ref 3.2–4.8)
ALBUMIN/GLOB SERPL: 1.3 G/DL (ref 1.5–2.5)
ALP SERPL-CCNC: 48 U/L (ref 25–100)
ALT SERPL W P-5'-P-CCNC: 17 U/L (ref 7–40)
ANION GAP SERPL CALCULATED.3IONS-SCNC: 9 MMOL/L (ref 3–11)
AST SERPL-CCNC: 24 U/L (ref 0–33)
BASOPHILS # BLD AUTO: 0.02 10*3/MM3 (ref 0–0.2)
BASOPHILS # BLD MANUAL: 0 10*3/MM3 (ref 0–0.2)
BASOPHILS NFR BLD AUTO: 0 % (ref 0–1)
BASOPHILS NFR BLD AUTO: 0.1 % (ref 0–1)
BILIRUB SERPL-MCNC: 0.3 MG/DL (ref 0.3–1.2)
BUN BLD-MCNC: 27 MG/DL (ref 9–23)
BUN/CREAT SERPL: 33.8 (ref 7–25)
CA-I SERPL ISE-MCNC: 1.16 MMOL/L (ref 1.12–1.32)
CALCIUM SPEC-SCNC: 7.7 MG/DL (ref 8.7–10.4)
CHLORIDE SERPL-SCNC: 115 MMOL/L (ref 99–109)
CO2 SERPL-SCNC: 18 MMOL/L (ref 20–31)
CREAT BLD-MCNC: 0.8 MG/DL (ref 0.6–1.3)
DEPRECATED RDW RBC AUTO: 46.9 FL (ref 37–54)
EOSINOPHIL # BLD AUTO: 0 10*3/MM3 (ref 0–0.3)
EOSINOPHIL # BLD MANUAL: 0 10*3/MM3 (ref 0.1–0.3)
EOSINOPHIL NFR BLD AUTO: 0 % (ref 0–3)
EOSINOPHIL NFR BLD MANUAL: 0 % (ref 0–3)
ERYTHROCYTE [DISTWIDTH] IN BLOOD BY AUTOMATED COUNT: 14.2 % (ref 11.3–14.5)
GFR SERPL CREATININE-BSD FRML MDRD: 71 ML/MIN/1.73
GLOBULIN UR ELPH-MCNC: 2.4 GM/DL
GLUCOSE BLD-MCNC: 127 MG/DL (ref 70–100)
GLUCOSE BLDC GLUCOMTR-MCNC: 101 MG/DL (ref 70–130)
HCT VFR BLD AUTO: 33.4 % (ref 34.5–44)
HGB BLD-MCNC: 11.3 G/DL (ref 11.5–15.5)
IMM GRANULOCYTES # BLD: 0.45 10*3/MM3 (ref 0–0.03)
IMM GRANULOCYTES NFR BLD: 2.9 % (ref 0–0.6)
LYMPHOCYTES # BLD AUTO: 0.44 10*3/MM3 (ref 0.6–4.8)
LYMPHOCYTES # BLD MANUAL: 0.46 10*3/MM3 (ref 0.6–4.8)
LYMPHOCYTES NFR BLD AUTO: 2.9 % (ref 24–44)
LYMPHOCYTES NFR BLD MANUAL: 3 % (ref 24–44)
LYMPHOCYTES NFR BLD MANUAL: 8 % (ref 0–12)
MAGNESIUM SERPL-MCNC: 3.1 MG/DL (ref 1.3–2.7)
MCH RBC QN AUTO: 30.5 PG (ref 27–31)
MCHC RBC AUTO-ENTMCNC: 33.8 G/DL (ref 32–36)
MCV RBC AUTO: 90 FL (ref 80–99)
METAMYELOCYTES NFR BLD MANUAL: 3 % (ref 0–0)
MONOCYTES # BLD AUTO: 1.13 10*3/MM3 (ref 0–1)
MONOCYTES # BLD AUTO: 1.22 10*3/MM3 (ref 0–1)
MONOCYTES NFR BLD AUTO: 7.4 % (ref 0–12)
NEUTROPHILS # BLD AUTO: 13.15 10*3/MM3 (ref 1.5–8.3)
NEUTROPHILS # BLD AUTO: 13.7 10*3/MM3 (ref 1.5–8.3)
NEUTROPHILS NFR BLD AUTO: 89.6 % (ref 41–71)
NEUTROPHILS NFR BLD MANUAL: 53 % (ref 41–71)
NEUTS BAND NFR BLD MANUAL: 33 % (ref 0–5)
PHOSPHATE SERPL-MCNC: 2 MG/DL (ref 2.4–5.1)
PLAT MORPH BLD: NORMAL
PLATELET # BLD AUTO: 192 10*3/MM3 (ref 150–450)
PMV BLD AUTO: 10.9 FL (ref 6–12)
POTASSIUM BLD-SCNC: 4 MMOL/L (ref 3.5–5.5)
PROT SERPL-MCNC: 5.6 G/DL (ref 5.7–8.2)
RBC # BLD AUTO: 3.71 10*6/MM3 (ref 3.89–5.14)
RBC MORPH BLD: NORMAL
SODIUM BLD-SCNC: 142 MMOL/L (ref 132–146)
TOXIC GRANULATION: ABNORMAL
VANCOMYCIN SERPL-MCNC: 10.2 MCG/ML (ref 5–40)
WBC NRBC COR # BLD: 15.29 10*3/MM3 (ref 3.5–10.8)

## 2018-04-24 PROCEDURE — 82962 GLUCOSE BLOOD TEST: CPT

## 2018-04-24 PROCEDURE — 99233 SBSQ HOSP IP/OBS HIGH 50: CPT | Performed by: INTERNAL MEDICINE

## 2018-04-24 PROCEDURE — 94799 UNLISTED PULMONARY SVC/PX: CPT

## 2018-04-24 PROCEDURE — 25010000003 CEFTRIAXONE PER 250 MG: Performed by: INTERNAL MEDICINE

## 2018-04-24 PROCEDURE — 70490 CT SOFT TISSUE NECK W/O DYE: CPT

## 2018-04-24 PROCEDURE — 80202 ASSAY OF VANCOMYCIN: CPT

## 2018-04-24 PROCEDURE — 85007 BL SMEAR W/DIFF WBC COUNT: CPT | Performed by: INTERNAL MEDICINE

## 2018-04-24 PROCEDURE — 84100 ASSAY OF PHOSPHORUS: CPT | Performed by: NURSE PRACTITIONER

## 2018-04-24 PROCEDURE — 25010000002 VANCOMYCIN PER 500 MG

## 2018-04-24 PROCEDURE — 97162 PT EVAL MOD COMPLEX 30 MIN: CPT

## 2018-04-24 PROCEDURE — 97165 OT EVAL LOW COMPLEX 30 MIN: CPT

## 2018-04-24 PROCEDURE — 94760 N-INVAS EAR/PLS OXIMETRY 1: CPT

## 2018-04-24 PROCEDURE — 80053 COMPREHEN METABOLIC PANEL: CPT | Performed by: NURSE PRACTITIONER

## 2018-04-24 PROCEDURE — 83735 ASSAY OF MAGNESIUM: CPT | Performed by: INTERNAL MEDICINE

## 2018-04-24 PROCEDURE — 25010000002 HEPARIN (PORCINE) PER 1000 UNITS: Performed by: PHYSICIAN ASSISTANT

## 2018-04-24 PROCEDURE — 71045 X-RAY EXAM CHEST 1 VIEW: CPT

## 2018-04-24 PROCEDURE — 25010000003 POTASSIUM CHLORIDE 20 MEQ/250ML SOLUTION: Performed by: NURSE PRACTITIONER

## 2018-04-24 PROCEDURE — 25010000002 MORPHINE SULFATE (PF) 2 MG/ML SOLUTION: Performed by: INTERNAL MEDICINE

## 2018-04-24 PROCEDURE — 92526 ORAL FUNCTION THERAPY: CPT

## 2018-04-24 PROCEDURE — 82330 ASSAY OF CALCIUM: CPT | Performed by: NURSE PRACTITIONER

## 2018-04-24 PROCEDURE — 85025 COMPLETE CBC W/AUTO DIFF WBC: CPT | Performed by: INTERNAL MEDICINE

## 2018-04-24 PROCEDURE — 94640 AIRWAY INHALATION TREATMENT: CPT

## 2018-04-24 PROCEDURE — 25010000002 PIPERACILLIN SOD-TAZOBACTAM PER 1 G: Performed by: PHYSICIAN ASSISTANT

## 2018-04-24 PROCEDURE — 25010000002 DEXAMETHASONE: Performed by: NURSE PRACTITIONER

## 2018-04-24 RX ORDER — CEFTRIAXONE SODIUM 2 G/50ML
2 INJECTION, SOLUTION INTRAVENOUS EVERY 24 HOURS
Status: DISCONTINUED | OUTPATIENT
Start: 2018-04-24 | End: 2018-04-30 | Stop reason: HOSPADM

## 2018-04-24 RX ORDER — CLINDAMYCIN PHOSPHATE 600 MG/50ML
600 INJECTION INTRAVENOUS EVERY 8 HOURS
Status: DISCONTINUED | OUTPATIENT
Start: 2018-04-24 | End: 2018-04-24

## 2018-04-24 RX ORDER — VANCOMYCIN HYDROCHLORIDE 1 G/200ML
15 INJECTION, SOLUTION INTRAVENOUS
Status: DISCONTINUED | OUTPATIENT
Start: 2018-04-24 | End: 2018-04-24 | Stop reason: ALTCHOICE

## 2018-04-24 RX ORDER — MORPHINE SULFATE 2 MG/ML
4 INJECTION, SOLUTION INTRAMUSCULAR; INTRAVENOUS
Status: DISCONTINUED | OUTPATIENT
Start: 2018-04-24 | End: 2018-04-30 | Stop reason: HOSPADM

## 2018-04-24 RX ADMIN — MORPHINE SULFATE 2 MG: 10 INJECTION INTRAVENOUS at 12:02

## 2018-04-24 RX ADMIN — IPRATROPIUM BROMIDE AND ALBUTEROL SULFATE 3 ML: 2.5; .5 SOLUTION RESPIRATORY (INHALATION) at 16:37

## 2018-04-24 RX ADMIN — POTASSIUM PHOSPHATE, MONOBASIC AND POTASSIUM PHOSPHATE, DIBASIC 15 MMOL: 224; 236 INJECTION, SOLUTION INTRAVENOUS at 11:25

## 2018-04-24 RX ADMIN — IPRATROPIUM BROMIDE AND ALBUTEROL SULFATE 3 ML: 2.5; .5 SOLUTION RESPIRATORY (INHALATION) at 13:26

## 2018-04-24 RX ADMIN — CLINDAMYCIN PHOSPHATE 600 MG: 12 INJECTION, SOLUTION INTRAVENOUS at 19:31

## 2018-04-24 RX ADMIN — VANCOMYCIN HYDROCHLORIDE 1000 MG: 1 INJECTION, SOLUTION INTRAVENOUS at 08:01

## 2018-04-24 RX ADMIN — DEXAMETHASONE SODIUM PHOSPHATE 10 MG: 10 INJECTION INTRAMUSCULAR; INTRAVENOUS at 18:08

## 2018-04-24 RX ADMIN — HEPARIN SODIUM 5000 UNITS: 5000 INJECTION, SOLUTION INTRAVENOUS; SUBCUTANEOUS at 21:55

## 2018-04-24 RX ADMIN — POTASSIUM CHLORIDE 20 MEQ: 149 INJECTION, SOLUTION, CONCENTRATE INTRAVENOUS at 00:17

## 2018-04-24 RX ADMIN — CEFTRIAXONE SODIUM 2 G: 2 INJECTION, SOLUTION INTRAVENOUS at 11:25

## 2018-04-24 RX ADMIN — SODIUM CHLORIDE, POTASSIUM CHLORIDE, SODIUM LACTATE AND CALCIUM CHLORIDE 100 ML/HR: 600; 310; 30; 20 INJECTION, SOLUTION INTRAVENOUS at 03:51

## 2018-04-24 RX ADMIN — DEXAMETHASONE SODIUM PHOSPHATE 10 MG: 10 INJECTION INTRAMUSCULAR; INTRAVENOUS at 11:24

## 2018-04-24 RX ADMIN — TAZOBACTAM SODIUM AND PIPERACILLIN SODIUM 3.38 G: 375; 3 INJECTION, SOLUTION INTRAVENOUS at 08:00

## 2018-04-24 RX ADMIN — DEXAMETHASONE SODIUM PHOSPHATE 10 MG: 10 INJECTION INTRAMUSCULAR; INTRAVENOUS at 01:39

## 2018-04-24 RX ADMIN — HEPARIN SODIUM 5000 UNITS: 5000 INJECTION, SOLUTION INTRAVENOUS; SUBCUTANEOUS at 14:20

## 2018-04-24 RX ADMIN — MORPHINE SULFATE 4 MG: 10 INJECTION INTRAVENOUS at 21:59

## 2018-04-24 RX ADMIN — HEPARIN SODIUM 5000 UNITS: 5000 INJECTION, SOLUTION INTRAVENOUS; SUBCUTANEOUS at 05:17

## 2018-04-24 RX ADMIN — CLINDAMYCIN PHOSPHATE 600 MG: 12 INJECTION, SOLUTION INTRAVENOUS at 11:25

## 2018-04-24 RX ADMIN — IPRATROPIUM BROMIDE AND ALBUTEROL SULFATE 3 ML: 2.5; .5 SOLUTION RESPIRATORY (INHALATION) at 09:22

## 2018-04-24 RX ADMIN — IPRATROPIUM BROMIDE AND ALBUTEROL SULFATE 3 ML: 2.5; .5 SOLUTION RESPIRATORY (INHALATION) at 19:55

## 2018-04-24 RX ADMIN — TAZOBACTAM SODIUM AND PIPERACILLIN SODIUM 3.38 G: 375; 3 INJECTION, SOLUTION INTRAVENOUS at 00:36

## 2018-04-24 NOTE — PROGRESS NOTES
ENT Consult Note    Referring Provider: [unfilled]        Patient Care Team:  Eric Brunson DO as PCP - General (Family Medicine)          Subjective .  I was called to the emergency department early this morning to evaluate Mrs. Gutierrez airway by Dr. Lagos.  Mrs. Russell has been sick recently with orthopedic injuries.  She has become dehydrated.  She was brought to the emergency department last evening with a severe sore throat and difficulty swallowing.  She was found to have an elevated white count.  There was concern by the nursing staff that her voice was becoming more muffled and weak.    History of present illness:     Past Medical History:   Diagnosis Date   • High cholesterol    • History of pelvic fracture 1/8/2018   • Hyperlipidemia    • Hypertension    • Osteoarthritis    • Osteoporosis      Past Surgical History:   Procedure Laterality Date   • COLON SURGERY     • HYSTERECTOMY     • SHOULDER SURGERY      rotator cuff (left)   • TONSILLECTOMY       No Known Allergies    Social History     Social History   • Marital status:      Spouse name: N/A   • Number of children: N/A   • Years of education: N/A     Occupational History   • Not on file.     Social History Main Topics   • Smoking status: Never Smoker   • Smokeless tobacco: Never Used   • Alcohol use No   • Drug use: No   • Sexual activity: Defer     Other Topics Concern   • Not on file     Social History Narrative   • No narrative on file     Family History   Problem Relation Age of Onset   • Lymphoma Mother    • Melanoma Mother    • Cancer Mother    • Osteoarthritis Mother    • Hypertension Mother    • Stroke Father    • Heart disease Father    • Heart failure Father    • Hypertension Father    • Heart attack Father        REVIEW OF SYMPTOMS    Systemic Symptoms: recent fever, no recent weight loss  Head Symptoms: No headache, no facial pain, no facial weakness  Eye Symptoms: No blurry vision, full range of motion  ENT Symptoms: No loss of  hearing, no hoarseness  Cardiovascular Symptoms: No cold hands or feet  Pulmonary Symptoms: No dyspnea   GI Symptoms: No dysphagia, no choking  Endocrine Symptoms: No heat or cold intolerance  Neurological Symptoms: No vertigo, no taste or smell disturbances  Skin Symptoms: No abnormal lumps      Vital Signs   Temp:  [98.1 °F (36.7 °C)-100.9 °F (38.3 °C)] 98.1 °F (36.7 °C)  Heart Rate:  [] 98  Resp:  [18-28] 18  BP: ()/(30-80) 113/58     There is no height or weight on file to calculate BMI.      Physical Exam:     General Appearance:    Alert, weak and cooperative, weak voice, no audible stridor   Head:    Normocephalic, without obvious abnormality, atraumatic   Eyes:          conjunctivae and sclerae normal, corneas clear, PERRLA   Ears:   Ear canals clear, right TM normal, left TM normal   Oral Exam:   Normal tongue, tonsils    Laryngeal:  Fiberoptic laryngoscopy at the bedside performed with topical Pontocaine and oxymetazoline spray and tolerated well       Findings: Moderately inflamed and slightly swollen symmetric lingual tonsils; no evidence of peritonsillar or retropharyngeal abscess; moderate inflammation and edema of the left area epiglottic fold and left piriform sinus; the true vocal cords are mildly inflamed but both abducted and abducted in the midline well; there are thick pasty secretions pooling in the post cricoid area; no evidence of airway obstruction    Neck:   Tender left jugular digastric adenopathy, supple, trachea midline, no thyromegaly, no   carotid bruit   Salivary Glands:     No palpable masses   Lungs:     Clear to auscultation     Heart:    Regular rhythm and normal rate   Abdomen:     Soft non-tender, non-distended, no guarding   Pulses:   Pulses palpable and equal bilaterally   Skin:   No bleeding, bruising or rash   Neurologic:   Cranial nerves II-XII grossly intact, no spontaneous   nystagmus       Results Review:   I reviewed the patient's new clinical  results.      Results from last 7 days  Lab Units 04/23/18  0820   WBC 10*3/mm3 11.86*   HEMOGLOBIN g/dL 13.7   HEMATOCRIT % 40.3   PLATELETS 10*3/mm3 244       Results from last 7 days  Lab Units 04/23/18  0822   SODIUM mmol/L 139   POTASSIUM mmol/L 3.2*   CHLORIDE mmol/L 108   CO2 mmol/L 17.0*   BUN mg/dL 26*   CREATININE mg/dL 1.30   GLUCOSE mg/dL 144*   CALCIUM mg/dL 8.0*     Blood Culture   Date Value Ref Range Status   04/23/2018 Abnormal Stain (A)  Preliminary   04/23/2018 Abnormal Stain (A)  Preliminary     BCID, PCR   Date Value Ref Range Status   04/23/2018 (C) No organism detected by BCID PCR. Final    Streptococcus pyogenes (Group A). Identification by BCID PCR.                         Imaging Results (last 72 hours)     ** No results found for the last 72 hours. **            Assessment/Plan     Mrs. Russell has acute pharyngitis with lingual tonsillitis but has no evidence of a peritonsillar or retropharyngeal abscess.  She has a patent airway with no evidence of obstruction of.  She does have very dry mucous membranes with thick pasty secretions that are pooling in the post cricoid area but I saw no evidence of aspiration.  I would expect to respond IV hydration antibiotics and a short course of steroids.  I agree with a decision to admit her for hydration and IV antibiotics and observation of her airway.    I discussed the patients findings and my recommendations with patient.     Terry Braswell MD  04/23/18  8:35 PM    Time: Total face-to-face/floor time

## 2018-04-25 ENCOUNTER — APPOINTMENT (OUTPATIENT)
Dept: GENERAL RADIOLOGY | Facility: HOSPITAL | Age: 73
End: 2018-04-25

## 2018-04-25 LAB
ALBUMIN SERPL-MCNC: 3.3 G/DL (ref 3.2–4.8)
ANION GAP SERPL CALCULATED.3IONS-SCNC: 6 MMOL/L (ref 3–11)
BACTERIA SPEC AEROBE CULT: ABNORMAL
BACTERIA SPEC AEROBE CULT: ABNORMAL
BACTERIA SPEC AEROBE CULT: NORMAL
BACTERIA SPEC AEROBE CULT: NORMAL
BASOPHILS # BLD AUTO: 0.01 10*3/MM3 (ref 0–0.2)
BASOPHILS NFR BLD AUTO: 0.1 % (ref 0–1)
BUN BLD-MCNC: 30 MG/DL (ref 9–23)
BUN/CREAT SERPL: 50 (ref 7–25)
CALCIUM SPEC-SCNC: 8.5 MG/DL (ref 8.7–10.4)
CHLORIDE SERPL-SCNC: 114 MMOL/L (ref 99–109)
CO2 SERPL-SCNC: 23 MMOL/L (ref 20–31)
CREAT BLD-MCNC: 0.6 MG/DL (ref 0.6–1.3)
DEPRECATED RDW RBC AUTO: 47.7 FL (ref 37–54)
EOSINOPHIL # BLD AUTO: 0 10*3/MM3 (ref 0–0.3)
EOSINOPHIL NFR BLD AUTO: 0 % (ref 0–3)
ERYTHROCYTE [DISTWIDTH] IN BLOOD BY AUTOMATED COUNT: 14.4 % (ref 11.3–14.5)
GFR SERPL CREATININE-BSD FRML MDRD: 99 ML/MIN/1.73
GLUCOSE BLD-MCNC: 106 MG/DL (ref 70–100)
HCT VFR BLD AUTO: 32.1 % (ref 34.5–44)
HGB BLD-MCNC: 10.7 G/DL (ref 11.5–15.5)
IMM GRANULOCYTES # BLD: 0.16 10*3/MM3 (ref 0–0.03)
IMM GRANULOCYTES NFR BLD: 0.9 % (ref 0–0.6)
LYMPHOCYTES # BLD AUTO: 0.58 10*3/MM3 (ref 0.6–4.8)
LYMPHOCYTES NFR BLD AUTO: 3.3 % (ref 24–44)
MAGNESIUM SERPL-MCNC: 3.2 MG/DL (ref 1.3–2.7)
MCH RBC QN AUTO: 29.9 PG (ref 27–31)
MCHC RBC AUTO-ENTMCNC: 33.3 G/DL (ref 32–36)
MCV RBC AUTO: 89.7 FL (ref 80–99)
MONOCYTES # BLD AUTO: 0.91 10*3/MM3 (ref 0–1)
MONOCYTES NFR BLD AUTO: 5.2 % (ref 0–12)
NEUTROPHILS # BLD AUTO: 15.93 10*3/MM3 (ref 1.5–8.3)
NEUTROPHILS NFR BLD AUTO: 91.4 % (ref 41–71)
PHOSPHATE SERPL-MCNC: 1.8 MG/DL (ref 2.4–5.1)
PLATELET # BLD AUTO: 169 10*3/MM3 (ref 150–450)
PMV BLD AUTO: 10.4 FL (ref 6–12)
POTASSIUM BLD-SCNC: 3.4 MMOL/L (ref 3.5–5.5)
RBC # BLD AUTO: 3.58 10*6/MM3 (ref 3.89–5.14)
SODIUM BLD-SCNC: 143 MMOL/L (ref 132–146)
STREP GROUPING: ABNORMAL
WBC NRBC COR # BLD: 17.43 10*3/MM3 (ref 3.5–10.8)

## 2018-04-25 PROCEDURE — 80069 RENAL FUNCTION PANEL: CPT | Performed by: INTERNAL MEDICINE

## 2018-04-25 PROCEDURE — 25010000003 CEFTRIAXONE PER 250 MG: Performed by: INTERNAL MEDICINE

## 2018-04-25 PROCEDURE — 83735 ASSAY OF MAGNESIUM: CPT | Performed by: INTERNAL MEDICINE

## 2018-04-25 PROCEDURE — 85025 COMPLETE CBC W/AUTO DIFF WBC: CPT | Performed by: INTERNAL MEDICINE

## 2018-04-25 PROCEDURE — 25010000002 HEPARIN (PORCINE) PER 1000 UNITS: Performed by: PHYSICIAN ASSISTANT

## 2018-04-25 PROCEDURE — 92526 ORAL FUNCTION THERAPY: CPT

## 2018-04-25 PROCEDURE — 99233 SBSQ HOSP IP/OBS HIGH 50: CPT | Performed by: INTERNAL MEDICINE

## 2018-04-25 PROCEDURE — 92610 EVALUATE SWALLOWING FUNCTION: CPT

## 2018-04-25 PROCEDURE — 25010000002 DEXAMETHASONE: Performed by: NURSE PRACTITIONER

## 2018-04-25 PROCEDURE — 71045 X-RAY EXAM CHEST 1 VIEW: CPT

## 2018-04-25 PROCEDURE — 94760 N-INVAS EAR/PLS OXIMETRY 1: CPT

## 2018-04-25 PROCEDURE — 94799 UNLISTED PULMONARY SVC/PX: CPT

## 2018-04-25 PROCEDURE — 25010000002 MORPHINE SULFATE (PF) 2 MG/ML SOLUTION: Performed by: INTERNAL MEDICINE

## 2018-04-25 RX ORDER — LISINOPRIL 20 MG/1
20 TABLET ORAL
Status: DISCONTINUED | OUTPATIENT
Start: 2018-04-25 | End: 2018-04-29

## 2018-04-25 RX ADMIN — MORPHINE SULFATE 2 MG: 10 INJECTION INTRAVENOUS at 21:26

## 2018-04-25 RX ADMIN — HEPARIN SODIUM 5000 UNITS: 5000 INJECTION, SOLUTION INTRAVENOUS; SUBCUTANEOUS at 14:17

## 2018-04-25 RX ADMIN — VENLAFAXINE 75 MG: 37.5 TABLET ORAL at 18:13

## 2018-04-25 RX ADMIN — ATORVASTATIN CALCIUM 10 MG: 10 TABLET, FILM COATED ORAL at 20:20

## 2018-04-25 RX ADMIN — SODIUM CHLORIDE, POTASSIUM CHLORIDE, SODIUM LACTATE AND CALCIUM CHLORIDE 75 ML/HR: 600; 310; 30; 20 INJECTION, SOLUTION INTRAVENOUS at 02:06

## 2018-04-25 RX ADMIN — DEXAMETHASONE SODIUM PHOSPHATE 10 MG: 10 INJECTION INTRAMUSCULAR; INTRAVENOUS at 02:06

## 2018-04-25 RX ADMIN — DEXAMETHASONE SODIUM PHOSPHATE 10 MG: 10 INJECTION INTRAMUSCULAR; INTRAVENOUS at 18:13

## 2018-04-25 RX ADMIN — POTASSIUM PHOSPHATE, MONOBASIC AND POTASSIUM PHOSPHATE, DIBASIC 15 MMOL: 224; 236 INJECTION, SOLUTION INTRAVENOUS at 10:11

## 2018-04-25 RX ADMIN — LISINOPRIL 20 MG: 20 TABLET ORAL at 10:10

## 2018-04-25 RX ADMIN — IPRATROPIUM BROMIDE AND ALBUTEROL SULFATE 3 ML: 2.5; .5 SOLUTION RESPIRATORY (INHALATION) at 16:15

## 2018-04-25 RX ADMIN — CEFTRIAXONE SODIUM 2 G: 2 INJECTION, SOLUTION INTRAVENOUS at 11:31

## 2018-04-25 RX ADMIN — DEXAMETHASONE SODIUM PHOSPHATE 10 MG: 10 INJECTION INTRAMUSCULAR; INTRAVENOUS at 10:11

## 2018-04-25 RX ADMIN — HEPARIN SODIUM 5000 UNITS: 5000 INJECTION, SOLUTION INTRAVENOUS; SUBCUTANEOUS at 04:50

## 2018-04-25 RX ADMIN — MORPHINE SULFATE 2 MG: 10 INJECTION INTRAVENOUS at 10:11

## 2018-04-25 RX ADMIN — MORPHINE SULFATE 4 MG: 10 INJECTION INTRAVENOUS at 13:10

## 2018-04-25 RX ADMIN — HEPARIN SODIUM 5000 UNITS: 5000 INJECTION, SOLUTION INTRAVENOUS; SUBCUTANEOUS at 20:20

## 2018-04-25 RX ADMIN — IPRATROPIUM BROMIDE AND ALBUTEROL SULFATE 3 ML: 2.5; .5 SOLUTION RESPIRATORY (INHALATION) at 12:58

## 2018-04-25 RX ADMIN — MORPHINE SULFATE 2 MG: 10 INJECTION INTRAVENOUS at 02:10

## 2018-04-25 RX ADMIN — IPRATROPIUM BROMIDE AND ALBUTEROL SULFATE 3 ML: 2.5; .5 SOLUTION RESPIRATORY (INHALATION) at 20:56

## 2018-04-26 PROBLEM — B95.5 BACTEREMIA DUE TO STREPTOCOCCUS: Status: ACTIVE | Noted: 2018-04-26

## 2018-04-26 PROBLEM — A40.0 SEPSIS DUE TO GROUP A STREPTOCOCCUS (HCC): Status: ACTIVE | Noted: 2018-04-23

## 2018-04-26 PROBLEM — R78.81 BACTEREMIA DUE TO STREPTOCOCCUS: Status: ACTIVE | Noted: 2018-04-26

## 2018-04-26 LAB
BACTERIA SPEC AEROBE CULT: ABNORMAL
BACTERIA SPEC AEROBE CULT: ABNORMAL
GRAM STN SPEC: ABNORMAL
GRAM STN SPEC: ABNORMAL
ISOLATED FROM: ABNORMAL
STREP GROUPING: ABNORMAL

## 2018-04-26 PROCEDURE — 25010000002 DEXAMETHASONE: Performed by: NURSE PRACTITIONER

## 2018-04-26 PROCEDURE — 25010000002 DEXAMETHASONE PER 1 MG: Performed by: INTERNAL MEDICINE

## 2018-04-26 PROCEDURE — 25010000003 CEFTRIAXONE PER 250 MG: Performed by: INTERNAL MEDICINE

## 2018-04-26 PROCEDURE — 25010000002 HEPARIN (PORCINE) PER 1000 UNITS: Performed by: PHYSICIAN ASSISTANT

## 2018-04-26 PROCEDURE — 25010000002 DEXAMETHASONE: Performed by: INTERNAL MEDICINE

## 2018-04-26 PROCEDURE — 99232 SBSQ HOSP IP/OBS MODERATE 35: CPT | Performed by: INTERNAL MEDICINE

## 2018-04-26 PROCEDURE — 97116 GAIT TRAINING THERAPY: CPT

## 2018-04-26 PROCEDURE — 94799 UNLISTED PULMONARY SVC/PX: CPT

## 2018-04-26 PROCEDURE — 97530 THERAPEUTIC ACTIVITIES: CPT | Performed by: OCCUPATIONAL THERAPIST

## 2018-04-26 PROCEDURE — 94760 N-INVAS EAR/PLS OXIMETRY 1: CPT

## 2018-04-26 PROCEDURE — 94640 AIRWAY INHALATION TREATMENT: CPT

## 2018-04-26 PROCEDURE — 25010000002 MORPHINE SULFATE (PF) 2 MG/ML SOLUTION: Performed by: INTERNAL MEDICINE

## 2018-04-26 PROCEDURE — 97110 THERAPEUTIC EXERCISES: CPT

## 2018-04-26 RX ORDER — DEXAMETHASONE SODIUM PHOSPHATE 4 MG/ML
8 VIAL (ML) INJECTION EVERY 8 HOURS
Status: COMPLETED | OUTPATIENT
Start: 2018-04-26 | End: 2018-04-27

## 2018-04-26 RX ORDER — IPRATROPIUM BROMIDE AND ALBUTEROL SULFATE 2.5; .5 MG/3ML; MG/3ML
3 SOLUTION RESPIRATORY (INHALATION) EVERY 4 HOURS PRN
Status: DISCONTINUED | OUTPATIENT
Start: 2018-04-26 | End: 2018-04-30 | Stop reason: HOSPADM

## 2018-04-26 RX ADMIN — DULOXETINE HYDROCHLORIDE 30 MG: 30 CAPSULE, DELAYED RELEASE ORAL at 08:26

## 2018-04-26 RX ADMIN — VENLAFAXINE 75 MG: 37.5 TABLET ORAL at 17:31

## 2018-04-26 RX ADMIN — LISINOPRIL 20 MG: 20 TABLET ORAL at 08:26

## 2018-04-26 RX ADMIN — MORPHINE SULFATE 2 MG: 10 INJECTION INTRAVENOUS at 22:31

## 2018-04-26 RX ADMIN — VENLAFAXINE 75 MG: 37.5 TABLET ORAL at 08:26

## 2018-04-26 RX ADMIN — MORPHINE SULFATE 2 MG: 10 INJECTION INTRAVENOUS at 01:51

## 2018-04-26 RX ADMIN — IPRATROPIUM BROMIDE AND ALBUTEROL SULFATE 3 ML: 2.5; .5 SOLUTION RESPIRATORY (INHALATION) at 06:54

## 2018-04-26 RX ADMIN — DEXAMETHASONE SODIUM PHOSPHATE 10 MG: 10 INJECTION INTRAMUSCULAR; INTRAVENOUS at 06:16

## 2018-04-26 RX ADMIN — POTASSIUM CHLORIDE 40 MEQ: 750 CAPSULE, EXTENDED RELEASE ORAL at 13:07

## 2018-04-26 RX ADMIN — IPRATROPIUM BROMIDE AND ALBUTEROL SULFATE 3 ML: 2.5; .5 SOLUTION RESPIRATORY (INHALATION) at 11:13

## 2018-04-26 RX ADMIN — HEPARIN SODIUM 5000 UNITS: 5000 INJECTION, SOLUTION INTRAVENOUS; SUBCUTANEOUS at 06:16

## 2018-04-26 RX ADMIN — MORPHINE SULFATE 4 MG: 10 INJECTION INTRAVENOUS at 08:25

## 2018-04-26 RX ADMIN — HEPARIN SODIUM 5000 UNITS: 5000 INJECTION, SOLUTION INTRAVENOUS; SUBCUTANEOUS at 14:42

## 2018-04-26 RX ADMIN — ATORVASTATIN CALCIUM 10 MG: 10 TABLET, FILM COATED ORAL at 22:34

## 2018-04-26 RX ADMIN — CEFTRIAXONE SODIUM 2 G: 2 INJECTION, SOLUTION INTRAVENOUS at 11:54

## 2018-04-26 RX ADMIN — MORPHINE SULFATE 4 MG: 10 INJECTION INTRAVENOUS at 17:31

## 2018-04-26 RX ADMIN — DEXAMETHASONE SODIUM PHOSPHATE 10 MG: 10 INJECTION INTRAMUSCULAR; INTRAVENOUS at 13:07

## 2018-04-26 RX ADMIN — POTASSIUM CHLORIDE 40 MEQ: 750 CAPSULE, EXTENDED RELEASE ORAL at 08:26

## 2018-04-26 RX ADMIN — DEXAMETHASONE SODIUM PHOSPHATE 8 MG: 4 INJECTION, SOLUTION INTRAMUSCULAR; INTRAVENOUS at 22:30

## 2018-04-26 RX ADMIN — HEPARIN SODIUM 5000 UNITS: 5000 INJECTION, SOLUTION INTRAVENOUS; SUBCUTANEOUS at 22:31

## 2018-04-26 RX ADMIN — IPRATROPIUM BROMIDE AND ALBUTEROL SULFATE 3 ML: 2.5; .5 SOLUTION RESPIRATORY (INHALATION) at 15:08

## 2018-04-27 PROBLEM — N17.9 AKI (ACUTE KIDNEY INJURY) (HCC): Status: RESOLVED | Noted: 2018-04-23 | Resolved: 2018-04-27

## 2018-04-27 LAB
ANION GAP SERPL CALCULATED.3IONS-SCNC: 4 MMOL/L (ref 3–11)
ANION GAP SERPL CALCULATED.3IONS-SCNC: 6 MMOL/L (ref 3–11)
BASOPHILS # BLD MANUAL: 0 10*3/MM3 (ref 0–0.2)
BASOPHILS NFR BLD AUTO: 0 % (ref 0–1)
BUN BLD-MCNC: 29 MG/DL (ref 9–23)
BUN BLD-MCNC: 34 MG/DL (ref 9–23)
BUN/CREAT SERPL: 48.3 (ref 7–25)
BUN/CREAT SERPL: 56.7 (ref 7–25)
CALCIUM SPEC-SCNC: 8.3 MG/DL (ref 8.7–10.4)
CALCIUM SPEC-SCNC: 8.3 MG/DL (ref 8.7–10.4)
CHLORIDE SERPL-SCNC: 109 MMOL/L (ref 99–109)
CHLORIDE SERPL-SCNC: 111 MMOL/L (ref 99–109)
CO2 SERPL-SCNC: 21 MMOL/L (ref 20–31)
CO2 SERPL-SCNC: 23 MMOL/L (ref 20–31)
CREAT BLD-MCNC: 0.6 MG/DL (ref 0.6–1.3)
CREAT BLD-MCNC: 0.6 MG/DL (ref 0.6–1.3)
DEPRECATED RDW RBC AUTO: 47.9 FL (ref 37–54)
EOSINOPHIL # BLD MANUAL: 0 10*3/MM3 (ref 0.1–0.3)
EOSINOPHIL NFR BLD MANUAL: 0 % (ref 0–3)
ERYTHROCYTE [DISTWIDTH] IN BLOOD BY AUTOMATED COUNT: 14.4 % (ref 11.3–14.5)
GFR SERPL CREATININE-BSD FRML MDRD: 99 ML/MIN/1.73
GFR SERPL CREATININE-BSD FRML MDRD: 99 ML/MIN/1.73
GLUCOSE BLD-MCNC: 112 MG/DL (ref 70–100)
GLUCOSE BLD-MCNC: 98 MG/DL (ref 70–100)
HCT VFR BLD AUTO: 36.3 % (ref 34.5–44)
HGB BLD-MCNC: 12.1 G/DL (ref 11.5–15.5)
LYMPHOCYTES # BLD MANUAL: 1.78 10*3/MM3 (ref 0.6–4.8)
LYMPHOCYTES NFR BLD MANUAL: 15 % (ref 24–44)
LYMPHOCYTES NFR BLD MANUAL: 5 % (ref 0–12)
MCH RBC QN AUTO: 30 PG (ref 27–31)
MCHC RBC AUTO-ENTMCNC: 33.3 G/DL (ref 32–36)
MCV RBC AUTO: 90.1 FL (ref 80–99)
METAMYELOCYTES NFR BLD MANUAL: 6 % (ref 0–0)
MONOCYTES # BLD AUTO: 0.59 10*3/MM3 (ref 0–1)
MYELOCYTES NFR BLD MANUAL: 4 % (ref 0–0)
NEUTROPHILS # BLD AUTO: 8.2 10*3/MM3 (ref 1.5–8.3)
NEUTROPHILS NFR BLD MANUAL: 66 % (ref 41–71)
NEUTS BAND NFR BLD MANUAL: 3 % (ref 0–5)
PLAT MORPH BLD: NORMAL
PLATELET # BLD AUTO: 197 10*3/MM3 (ref 150–450)
PMV BLD AUTO: 11 FL (ref 6–12)
POTASSIUM BLD-SCNC: 4.8 MMOL/L (ref 3.5–5.5)
POTASSIUM BLD-SCNC: 5 MMOL/L (ref 3.5–5.5)
POTASSIUM BLD-SCNC: 5.4 MMOL/L (ref 3.5–5.5)
RBC # BLD AUTO: 4.03 10*6/MM3 (ref 3.89–5.14)
RBC MORPH BLD: NORMAL
SMUDGE CELLS BLD QL SMEAR: ABNORMAL
SODIUM BLD-SCNC: 136 MMOL/L (ref 132–146)
SODIUM BLD-SCNC: 138 MMOL/L (ref 132–146)
VARIANT LYMPHS NFR BLD MANUAL: 1 % (ref 0–5)
WBC NRBC COR # BLD: 11.89 10*3/MM3 (ref 3.5–10.8)

## 2018-04-27 PROCEDURE — 25010000003 CEFTRIAXONE PER 250 MG: Performed by: INTERNAL MEDICINE

## 2018-04-27 PROCEDURE — 25010000002 HEPARIN (PORCINE) PER 1000 UNITS: Performed by: PHYSICIAN ASSISTANT

## 2018-04-27 PROCEDURE — 85007 BL SMEAR W/DIFF WBC COUNT: CPT | Performed by: INTERNAL MEDICINE

## 2018-04-27 PROCEDURE — 99232 SBSQ HOSP IP/OBS MODERATE 35: CPT | Performed by: PHYSICIAN ASSISTANT

## 2018-04-27 PROCEDURE — 85025 COMPLETE CBC W/AUTO DIFF WBC: CPT | Performed by: INTERNAL MEDICINE

## 2018-04-27 PROCEDURE — 25010000002 DEXAMETHASONE PER 1 MG: Performed by: PHYSICIAN ASSISTANT

## 2018-04-27 PROCEDURE — 84132 ASSAY OF SERUM POTASSIUM: CPT | Performed by: INTERNAL MEDICINE

## 2018-04-27 PROCEDURE — 80048 BASIC METABOLIC PNL TOTAL CA: CPT | Performed by: PHYSICIAN ASSISTANT

## 2018-04-27 PROCEDURE — 25010000002 DEXAMETHASONE PER 1 MG: Performed by: INTERNAL MEDICINE

## 2018-04-27 PROCEDURE — 80048 BASIC METABOLIC PNL TOTAL CA: CPT | Performed by: INTERNAL MEDICINE

## 2018-04-27 RX ORDER — ATENOLOL 25 MG/1
25 TABLET ORAL
Status: DISCONTINUED | OUTPATIENT
Start: 2018-04-27 | End: 2018-04-30 | Stop reason: HOSPADM

## 2018-04-27 RX ORDER — DEXAMETHASONE SODIUM PHOSPHATE 4 MG/ML
6 VIAL (ML) INJECTION EVERY 8 HOURS
Status: COMPLETED | OUTPATIENT
Start: 2018-04-28 | End: 2018-04-29

## 2018-04-27 RX ORDER — SACCHAROMYCES BOULARDII 250 MG
250 CAPSULE ORAL 2 TIMES DAILY
Status: DISCONTINUED | OUTPATIENT
Start: 2018-04-27 | End: 2018-04-30 | Stop reason: HOSPADM

## 2018-04-27 RX ORDER — HYDROCHLOROTHIAZIDE 25 MG/1
25 TABLET ORAL DAILY
Status: DISCONTINUED | OUTPATIENT
Start: 2018-04-27 | End: 2018-04-30

## 2018-04-27 RX ADMIN — DEXAMETHASONE SODIUM PHOSPHATE 8 MG: 4 INJECTION, SOLUTION INTRAMUSCULAR; INTRAVENOUS at 13:47

## 2018-04-27 RX ADMIN — DULOXETINE HYDROCHLORIDE 30 MG: 30 CAPSULE, DELAYED RELEASE ORAL at 08:30

## 2018-04-27 RX ADMIN — ACETAMINOPHEN 650 MG: 325 TABLET, FILM COATED ORAL at 21:01

## 2018-04-27 RX ADMIN — HEPARIN SODIUM 5000 UNITS: 5000 INJECTION, SOLUTION INTRAVENOUS; SUBCUTANEOUS at 06:04

## 2018-04-27 RX ADMIN — HEPARIN SODIUM 5000 UNITS: 5000 INJECTION, SOLUTION INTRAVENOUS; SUBCUTANEOUS at 13:47

## 2018-04-27 RX ADMIN — DEXAMETHASONE SODIUM PHOSPHATE 8 MG: 4 INJECTION, SOLUTION INTRAMUSCULAR; INTRAVENOUS at 06:04

## 2018-04-27 RX ADMIN — Medication 250 MG: at 20:42

## 2018-04-27 RX ADMIN — VENLAFAXINE 75 MG: 37.5 TABLET ORAL at 08:30

## 2018-04-27 RX ADMIN — CEFTRIAXONE SODIUM 2 G: 2 INJECTION, SOLUTION INTRAVENOUS at 12:20

## 2018-04-27 RX ADMIN — ATENOLOL 25 MG: 25 TABLET ORAL at 15:16

## 2018-04-27 RX ADMIN — HEPARIN SODIUM 5000 UNITS: 5000 INJECTION, SOLUTION INTRAVENOUS; SUBCUTANEOUS at 20:42

## 2018-04-27 RX ADMIN — DEXAMETHASONE SODIUM PHOSPHATE 8 MG: 4 INJECTION, SOLUTION INTRAMUSCULAR; INTRAVENOUS at 20:42

## 2018-04-27 RX ADMIN — HYDROCHLOROTHIAZIDE 25 MG: 25 TABLET ORAL at 15:16

## 2018-04-27 RX ADMIN — ATORVASTATIN CALCIUM 10 MG: 10 TABLET, FILM COATED ORAL at 20:42

## 2018-04-27 RX ADMIN — VENLAFAXINE 75 MG: 37.5 TABLET ORAL at 17:36

## 2018-04-27 RX ADMIN — LISINOPRIL 20 MG: 20 TABLET ORAL at 08:30

## 2018-04-28 LAB
ANION GAP SERPL CALCULATED.3IONS-SCNC: 6 MMOL/L (ref 3–11)
BACTERIA SPEC AEROBE CULT: ABNORMAL
BACTERIA SPEC AEROBE CULT: ABNORMAL
BUN BLD-MCNC: 25 MG/DL (ref 9–23)
BUN/CREAT SERPL: 41.7 (ref 7–25)
CALCIUM SPEC-SCNC: 8.4 MG/DL (ref 8.7–10.4)
CHLORIDE SERPL-SCNC: 104 MMOL/L (ref 99–109)
CO2 SERPL-SCNC: 23 MMOL/L (ref 20–31)
CREAT BLD-MCNC: 0.6 MG/DL (ref 0.6–1.3)
DEPRECATED RDW RBC AUTO: 45.6 FL (ref 37–54)
ERYTHROCYTE [DISTWIDTH] IN BLOOD BY AUTOMATED COUNT: 14 % (ref 11.3–14.5)
GFR SERPL CREATININE-BSD FRML MDRD: 99 ML/MIN/1.73
GLUCOSE BLD-MCNC: 88 MG/DL (ref 70–100)
GRAM STN SPEC: ABNORMAL
HCT VFR BLD AUTO: 38.4 % (ref 34.5–44)
HGB BLD-MCNC: 13 G/DL (ref 11.5–15.5)
ISOLATED FROM: ABNORMAL
MCH RBC QN AUTO: 30.3 PG (ref 27–31)
MCHC RBC AUTO-ENTMCNC: 33.9 G/DL (ref 32–36)
MCV RBC AUTO: 89.5 FL (ref 80–99)
PLATELET # BLD AUTO: 218 10*3/MM3 (ref 150–450)
PMV BLD AUTO: 11 FL (ref 6–12)
POTASSIUM BLD-SCNC: 3.8 MMOL/L (ref 3.5–5.5)
RBC # BLD AUTO: 4.29 10*6/MM3 (ref 3.89–5.14)
SODIUM BLD-SCNC: 133 MMOL/L (ref 132–146)
STREP GROUPING: ABNORMAL
WBC NRBC COR # BLD: 14.8 10*3/MM3 (ref 3.5–10.8)

## 2018-04-28 PROCEDURE — 80048 BASIC METABOLIC PNL TOTAL CA: CPT | Performed by: PHYSICIAN ASSISTANT

## 2018-04-28 PROCEDURE — 25010000002 HEPARIN (PORCINE) PER 1000 UNITS: Performed by: PHYSICIAN ASSISTANT

## 2018-04-28 PROCEDURE — 85027 COMPLETE CBC AUTOMATED: CPT | Performed by: PHYSICIAN ASSISTANT

## 2018-04-28 PROCEDURE — 25010000003 CEFTRIAXONE PER 250 MG: Performed by: INTERNAL MEDICINE

## 2018-04-28 PROCEDURE — 25010000002 DEXAMETHASONE PER 1 MG: Performed by: PHYSICIAN ASSISTANT

## 2018-04-28 PROCEDURE — 99232 SBSQ HOSP IP/OBS MODERATE 35: CPT | Performed by: PHYSICIAN ASSISTANT

## 2018-04-28 RX ORDER — DEXAMETHASONE SODIUM PHOSPHATE 4 MG/ML
4 INJECTION, SOLUTION INTRA-ARTICULAR; INTRALESIONAL; INTRAMUSCULAR; INTRAVENOUS; SOFT TISSUE EVERY 8 HOURS
Status: DISCONTINUED | OUTPATIENT
Start: 2018-04-29 | End: 2018-04-29

## 2018-04-28 RX ADMIN — DULOXETINE HYDROCHLORIDE 30 MG: 30 CAPSULE, DELAYED RELEASE ORAL at 09:13

## 2018-04-28 RX ADMIN — HYDROCHLOROTHIAZIDE 25 MG: 25 TABLET ORAL at 09:13

## 2018-04-28 RX ADMIN — HEPARIN SODIUM 5000 UNITS: 5000 INJECTION, SOLUTION INTRAVENOUS; SUBCUTANEOUS at 06:00

## 2018-04-28 RX ADMIN — HEPARIN SODIUM 5000 UNITS: 5000 INJECTION, SOLUTION INTRAVENOUS; SUBCUTANEOUS at 14:20

## 2018-04-28 RX ADMIN — DEXAMETHASONE SODIUM PHOSPHATE 6 MG: 4 INJECTION, SOLUTION INTRAMUSCULAR; INTRAVENOUS at 05:32

## 2018-04-28 RX ADMIN — Medication 250 MG: at 09:13

## 2018-04-28 RX ADMIN — LISINOPRIL 20 MG: 20 TABLET ORAL at 09:13

## 2018-04-28 RX ADMIN — DEXAMETHASONE SODIUM PHOSPHATE 6 MG: 4 INJECTION, SOLUTION INTRAMUSCULAR; INTRAVENOUS at 14:19

## 2018-04-28 RX ADMIN — VENLAFAXINE 75 MG: 37.5 TABLET ORAL at 09:13

## 2018-04-28 RX ADMIN — VENLAFAXINE 75 MG: 37.5 TABLET ORAL at 17:47

## 2018-04-28 RX ADMIN — CEFTRIAXONE SODIUM 2 G: 2 INJECTION, SOLUTION INTRAVENOUS at 11:25

## 2018-04-28 RX ADMIN — ATENOLOL 25 MG: 25 TABLET ORAL at 09:13

## 2018-04-29 LAB
ANION GAP SERPL CALCULATED.3IONS-SCNC: 14 MMOL/L (ref 3–11)
BUN BLD-MCNC: 24 MG/DL (ref 9–23)
BUN/CREAT SERPL: 40 (ref 7–25)
C DIFF TOX GENS STL QL NAA+PROBE: NOT DETECTED
CALCIUM SPEC-SCNC: 8.3 MG/DL (ref 8.7–10.4)
CHLORIDE SERPL-SCNC: 97 MMOL/L (ref 99–109)
CO2 SERPL-SCNC: 22 MMOL/L (ref 20–31)
CREAT BLD-MCNC: 0.6 MG/DL (ref 0.6–1.3)
DEPRECATED RDW RBC AUTO: 41.7 FL (ref 37–54)
ERYTHROCYTE [DISTWIDTH] IN BLOOD BY AUTOMATED COUNT: 13.3 % (ref 11.3–14.5)
GFR SERPL CREATININE-BSD FRML MDRD: 99 ML/MIN/1.73
GLUCOSE BLD-MCNC: 76 MG/DL (ref 70–100)
HCT VFR BLD AUTO: 43.8 % (ref 34.5–44)
HGB BLD-MCNC: 15.4 G/DL (ref 11.5–15.5)
MCH RBC QN AUTO: 30.3 PG (ref 27–31)
MCHC RBC AUTO-ENTMCNC: 35.2 G/DL (ref 32–36)
MCV RBC AUTO: 86.2 FL (ref 80–99)
PLATELET # BLD AUTO: 266 10*3/MM3 (ref 150–450)
PMV BLD AUTO: 11.6 FL (ref 6–12)
POTASSIUM BLD-SCNC: 3.7 MMOL/L (ref 3.5–5.5)
RBC # BLD AUTO: 5.08 10*6/MM3 (ref 3.89–5.14)
SODIUM BLD-SCNC: 133 MMOL/L (ref 132–146)
WBC NRBC COR # BLD: 24.02 10*3/MM3 (ref 3.5–10.8)

## 2018-04-29 PROCEDURE — 25010000002 ONDANSETRON PER 1 MG: Performed by: NURSE PRACTITIONER

## 2018-04-29 PROCEDURE — 25810000003 SODIUM CHLORIDE 0.9 % WITH KCL 20 MEQ 20-0.9 MEQ/L-% SOLUTION: Performed by: PHYSICIAN ASSISTANT

## 2018-04-29 PROCEDURE — 25010000002 DEXAMETHASONE PER 1 MG: Performed by: PHYSICIAN ASSISTANT

## 2018-04-29 PROCEDURE — 85027 COMPLETE CBC AUTOMATED: CPT | Performed by: PHYSICIAN ASSISTANT

## 2018-04-29 PROCEDURE — 80048 BASIC METABOLIC PNL TOTAL CA: CPT | Performed by: PHYSICIAN ASSISTANT

## 2018-04-29 PROCEDURE — 25010000002 HEPARIN (PORCINE) PER 1000 UNITS: Performed by: PHYSICIAN ASSISTANT

## 2018-04-29 PROCEDURE — 87493 C DIFF AMPLIFIED PROBE: CPT | Performed by: PHYSICIAN ASSISTANT

## 2018-04-29 PROCEDURE — 63710000001 PREDNISONE PER 1 MG: Performed by: PHYSICIAN ASSISTANT

## 2018-04-29 PROCEDURE — 99233 SBSQ HOSP IP/OBS HIGH 50: CPT | Performed by: PHYSICIAN ASSISTANT

## 2018-04-29 PROCEDURE — 25010000003 CEFTRIAXONE PER 250 MG: Performed by: INTERNAL MEDICINE

## 2018-04-29 RX ORDER — PREDNISONE 20 MG/1
40 TABLET ORAL 2 TIMES DAILY WITH MEALS
Status: DISCONTINUED | OUTPATIENT
Start: 2018-04-29 | End: 2018-04-30

## 2018-04-29 RX ORDER — PREDNISONE 20 MG/1
40 TABLET ORAL 2 TIMES DAILY WITH MEALS
Status: DISCONTINUED | OUTPATIENT
Start: 2018-04-30 | End: 2018-04-29

## 2018-04-29 RX ORDER — DEXAMETHASONE SODIUM PHOSPHATE 4 MG/ML
4 INJECTION, SOLUTION INTRA-ARTICULAR; INTRALESIONAL; INTRAMUSCULAR; INTRAVENOUS; SOFT TISSUE EVERY 8 HOURS
Status: DISCONTINUED | OUTPATIENT
Start: 2018-04-29 | End: 2018-04-29

## 2018-04-29 RX ORDER — LISINOPRIL 20 MG/1
40 TABLET ORAL DAILY
Status: DISCONTINUED | OUTPATIENT
Start: 2018-04-29 | End: 2018-04-30 | Stop reason: HOSPADM

## 2018-04-29 RX ORDER — ONDANSETRON 2 MG/ML
4 INJECTION INTRAMUSCULAR; INTRAVENOUS EVERY 6 HOURS PRN
Status: DISCONTINUED | OUTPATIENT
Start: 2018-04-29 | End: 2018-04-30 | Stop reason: HOSPADM

## 2018-04-29 RX ORDER — FAMOTIDINE 20 MG/1
20 TABLET, FILM COATED ORAL DAILY
Status: DISCONTINUED | OUTPATIENT
Start: 2018-04-29 | End: 2018-04-29

## 2018-04-29 RX ORDER — SODIUM CHLORIDE AND POTASSIUM CHLORIDE 150; 900 MG/100ML; MG/100ML
75 INJECTION, SOLUTION INTRAVENOUS CONTINUOUS
Status: ACTIVE | OUTPATIENT
Start: 2018-04-29 | End: 2018-04-30

## 2018-04-29 RX ORDER — FAMOTIDINE 20 MG/1
20 TABLET, FILM COATED ORAL 2 TIMES DAILY
Status: DISCONTINUED | OUTPATIENT
Start: 2018-04-29 | End: 2018-04-30 | Stop reason: HOSPADM

## 2018-04-29 RX ADMIN — FAMOTIDINE 20 MG: 20 TABLET ORAL at 20:38

## 2018-04-29 RX ADMIN — HEPARIN SODIUM 5000 UNITS: 5000 INJECTION, SOLUTION INTRAVENOUS; SUBCUTANEOUS at 06:21

## 2018-04-29 RX ADMIN — CEFTRIAXONE SODIUM 2 G: 2 INJECTION, SOLUTION INTRAVENOUS at 12:17

## 2018-04-29 RX ADMIN — LISINOPRIL 40 MG: 20 TABLET ORAL at 09:09

## 2018-04-29 RX ADMIN — HEPARIN SODIUM 5000 UNITS: 5000 INJECTION, SOLUTION INTRAVENOUS; SUBCUTANEOUS at 00:18

## 2018-04-29 RX ADMIN — FAMOTIDINE 20 MG: 20 TABLET, FILM COATED ORAL at 09:09

## 2018-04-29 RX ADMIN — DULOXETINE HYDROCHLORIDE 30 MG: 30 CAPSULE, DELAYED RELEASE ORAL at 09:09

## 2018-04-29 RX ADMIN — DEXAMETHASONE SODIUM PHOSPHATE 6 MG: 4 INJECTION, SOLUTION INTRAMUSCULAR; INTRAVENOUS at 00:18

## 2018-04-29 RX ADMIN — ONDANSETRON 4 MG: 2 INJECTION INTRAMUSCULAR; INTRAVENOUS at 09:47

## 2018-04-29 RX ADMIN — VENLAFAXINE 75 MG: 37.5 TABLET ORAL at 17:06

## 2018-04-29 RX ADMIN — HYDROCHLOROTHIAZIDE 25 MG: 25 TABLET ORAL at 09:09

## 2018-04-29 RX ADMIN — ATORVASTATIN CALCIUM 10 MG: 10 TABLET, FILM COATED ORAL at 00:19

## 2018-04-29 RX ADMIN — POTASSIUM CHLORIDE AND SODIUM CHLORIDE 75 ML/HR: 900; 150 INJECTION, SOLUTION INTRAVENOUS at 12:17

## 2018-04-29 RX ADMIN — Medication 250 MG: at 20:38

## 2018-04-29 RX ADMIN — HEPARIN SODIUM 5000 UNITS: 5000 INJECTION, SOLUTION INTRAVENOUS; SUBCUTANEOUS at 20:38

## 2018-04-29 RX ADMIN — ONDANSETRON 4 MG: 2 INJECTION INTRAMUSCULAR; INTRAVENOUS at 03:49

## 2018-04-29 RX ADMIN — PREDNISONE 40 MG: 20 TABLET ORAL at 17:06

## 2018-04-29 RX ADMIN — VENLAFAXINE 75 MG: 37.5 TABLET ORAL at 09:09

## 2018-04-29 RX ADMIN — ATENOLOL 25 MG: 25 TABLET ORAL at 09:09

## 2018-04-29 RX ADMIN — Medication 250 MG: at 00:19

## 2018-04-29 RX ADMIN — DEXAMETHASONE SODIUM PHOSPHATE 4 MG: 4 INJECTION, SOLUTION INTRAMUSCULAR; INTRAVENOUS at 06:21

## 2018-04-29 RX ADMIN — ATORVASTATIN CALCIUM 10 MG: 10 TABLET, FILM COATED ORAL at 20:38

## 2018-04-29 RX ADMIN — Medication 250 MG: at 09:09

## 2018-04-29 RX ADMIN — HEPARIN SODIUM 5000 UNITS: 5000 INJECTION, SOLUTION INTRAVENOUS; SUBCUTANEOUS at 14:52

## 2018-04-30 VITALS
WEIGHT: 143.5 LBS | BODY MASS INDEX: 30.96 KG/M2 | HEART RATE: 70 BPM | RESPIRATION RATE: 18 BRPM | HEIGHT: 57 IN | DIASTOLIC BLOOD PRESSURE: 80 MMHG | OXYGEN SATURATION: 94 % | SYSTOLIC BLOOD PRESSURE: 156 MMHG | TEMPERATURE: 98 F

## 2018-04-30 LAB
ALBUMIN SERPL-MCNC: 3 G/DL (ref 3.2–4.8)
ANION GAP SERPL CALCULATED.3IONS-SCNC: 5 MMOL/L (ref 3–11)
BUN BLD-MCNC: 21 MG/DL (ref 9–23)
BUN/CREAT SERPL: 35 (ref 7–25)
CALCIUM SPEC-SCNC: 7.9 MG/DL (ref 8.7–10.4)
CHLORIDE SERPL-SCNC: 100 MMOL/L (ref 99–109)
CO2 SERPL-SCNC: 25 MMOL/L (ref 20–31)
CREAT BLD-MCNC: 0.6 MG/DL (ref 0.6–1.3)
GFR SERPL CREATININE-BSD FRML MDRD: 99 ML/MIN/1.73
GLUCOSE BLD-MCNC: 75 MG/DL (ref 70–100)
PHOSPHATE SERPL-MCNC: 2.6 MG/DL (ref 2.4–5.1)
POTASSIUM BLD-SCNC: 3.4 MMOL/L (ref 3.5–5.5)
SODIUM BLD-SCNC: 130 MMOL/L (ref 132–146)

## 2018-04-30 PROCEDURE — 25010000002 HEPARIN (PORCINE) PER 1000 UNITS: Performed by: PHYSICIAN ASSISTANT

## 2018-04-30 PROCEDURE — 80069 RENAL FUNCTION PANEL: CPT | Performed by: HOSPITALIST

## 2018-04-30 PROCEDURE — 87040 BLOOD CULTURE FOR BACTERIA: CPT | Performed by: HOSPITALIST

## 2018-04-30 PROCEDURE — 25810000003 SODIUM CHLORIDE 0.9 % WITH KCL 20 MEQ 20-0.9 MEQ/L-% SOLUTION: Performed by: PHYSICIAN ASSISTANT

## 2018-04-30 PROCEDURE — 97530 THERAPEUTIC ACTIVITIES: CPT

## 2018-04-30 PROCEDURE — 25010000003 CEFTRIAXONE PER 250 MG: Performed by: INTERNAL MEDICINE

## 2018-04-30 PROCEDURE — 99239 HOSP IP/OBS DSCHRG MGMT >30: CPT | Performed by: HOSPITALIST

## 2018-04-30 RX ORDER — POTASSIUM CHLORIDE 750 MG/1
40 CAPSULE, EXTENDED RELEASE ORAL AS NEEDED
Status: CANCELLED | OUTPATIENT
Start: 2018-04-30

## 2018-04-30 RX ORDER — LOPERAMIDE HYDROCHLORIDE 2 MG/1
2 CAPSULE ORAL 4 TIMES DAILY PRN
Qty: 30 CAPSULE | Refills: 2 | OUTPATIENT
Start: 2018-04-30 | End: 2020-12-28

## 2018-04-30 RX ORDER — POTASSIUM CHLORIDE 1.5 G/1.77G
40 POWDER, FOR SOLUTION ORAL AS NEEDED
Status: DISCONTINUED | OUTPATIENT
Start: 2018-04-30 | End: 2018-04-30 | Stop reason: HOSPADM

## 2018-04-30 RX ORDER — POTASSIUM CHLORIDE 750 MG/1
40 CAPSULE, EXTENDED RELEASE ORAL AS NEEDED
Status: DISCONTINUED | OUTPATIENT
Start: 2018-04-30 | End: 2018-04-30 | Stop reason: HOSPADM

## 2018-04-30 RX ORDER — SACCHAROMYCES BOULARDII 250 MG
250 CAPSULE ORAL 2 TIMES DAILY
Qty: 60 CAPSULE | Refills: 2 | OUTPATIENT
Start: 2018-04-30 | End: 2020-12-28

## 2018-04-30 RX ORDER — LOPERAMIDE HYDROCHLORIDE 2 MG/1
2 CAPSULE ORAL 4 TIMES DAILY PRN
Status: DISCONTINUED | OUTPATIENT
Start: 2018-04-30 | End: 2018-04-30 | Stop reason: HOSPADM

## 2018-04-30 RX ORDER — POTASSIUM CHLORIDE 1.5 G/1.77G
40 POWDER, FOR SOLUTION ORAL ONCE
Status: DISCONTINUED | OUTPATIENT
Start: 2018-04-30 | End: 2018-04-30 | Stop reason: HOSPADM

## 2018-04-30 RX ORDER — ONDANSETRON 4 MG/1
4 TABLET, FILM COATED ORAL EVERY 6 HOURS PRN
Qty: 60 TABLET | Refills: 2 | Status: CANCELLED | OUTPATIENT
Start: 2018-04-30

## 2018-04-30 RX ORDER — ONDANSETRON 4 MG/1
4 TABLET, FILM COATED ORAL EVERY 6 HOURS PRN
Qty: 30 TABLET | Refills: 2 | OUTPATIENT
Start: 2018-04-30 | End: 2020-12-28

## 2018-04-30 RX ORDER — ONDANSETRON 4 MG/1
4 TABLET, FILM COATED ORAL EVERY 6 HOURS PRN
Status: DISCONTINUED | OUTPATIENT
Start: 2018-04-30 | End: 2018-04-30 | Stop reason: HOSPADM

## 2018-04-30 RX ORDER — LISINOPRIL AND HYDROCHLOROTHIAZIDE 25; 20 MG/1; MG/1
2 TABLET ORAL DAILY
Qty: 60 TABLET | Refills: 2 | Status: CANCELLED | OUTPATIENT
Start: 2018-04-30

## 2018-04-30 RX ORDER — LISINOPRIL 40 MG/1
40 TABLET ORAL DAILY
Qty: 30 TABLET | Refills: 2 | Status: SHIPPED | OUTPATIENT
Start: 2018-05-01

## 2018-04-30 RX ORDER — NIFEDIPINE 30 MG/1
30 TABLET, FILM COATED, EXTENDED RELEASE ORAL
Qty: 30 TABLET | Refills: 2 | OUTPATIENT
Start: 2018-05-01 | End: 2022-02-26

## 2018-04-30 RX ORDER — NIFEDIPINE 30 MG/1
30 TABLET, EXTENDED RELEASE ORAL
Status: DISCONTINUED | OUTPATIENT
Start: 2018-04-30 | End: 2018-04-30 | Stop reason: HOSPADM

## 2018-04-30 RX ORDER — CEFTRIAXONE SODIUM 2 G/50ML
2 INJECTION, SOLUTION INTRAVENOUS EVERY 24 HOURS
Qty: 250 ML | Refills: 0 | Status: SHIPPED | OUTPATIENT
Start: 2018-05-01 | End: 2018-05-06

## 2018-04-30 RX ADMIN — Medication 250 MG: at 09:20

## 2018-04-30 RX ADMIN — NIFEDIPINE 30 MG: 30 TABLET, FILM COATED, EXTENDED RELEASE ORAL at 09:20

## 2018-04-30 RX ADMIN — POTASSIUM CHLORIDE 40 MEQ: 750 CAPSULE, EXTENDED RELEASE ORAL at 15:39

## 2018-04-30 RX ADMIN — ATENOLOL 25 MG: 25 TABLET ORAL at 09:21

## 2018-04-30 RX ADMIN — HYDROCHLOROTHIAZIDE 25 MG: 25 TABLET ORAL at 09:20

## 2018-04-30 RX ADMIN — POTASSIUM CHLORIDE AND SODIUM CHLORIDE 75 ML/HR: 900; 150 INJECTION, SOLUTION INTRAVENOUS at 01:51

## 2018-04-30 RX ADMIN — LISINOPRIL 40 MG: 20 TABLET ORAL at 09:20

## 2018-04-30 RX ADMIN — FAMOTIDINE 20 MG: 20 TABLET ORAL at 09:21

## 2018-04-30 RX ADMIN — POTASSIUM CHLORIDE 40 MEQ: 750 CAPSULE, EXTENDED RELEASE ORAL at 11:59

## 2018-04-30 RX ADMIN — CEFTRIAXONE SODIUM 2 G: 2 INJECTION, SOLUTION INTRAVENOUS at 11:59

## 2018-04-30 RX ADMIN — HEPARIN SODIUM 5000 UNITS: 5000 INJECTION, SOLUTION INTRAVENOUS; SUBCUTANEOUS at 05:01

## 2018-04-30 RX ADMIN — DULOXETINE HYDROCHLORIDE 30 MG: 30 CAPSULE, DELAYED RELEASE ORAL at 09:20

## 2018-04-30 RX ADMIN — VENLAFAXINE 75 MG: 37.5 TABLET ORAL at 09:21

## 2018-05-03 ENCOUNTER — OFFICE VISIT (OUTPATIENT)
Dept: ORTHOPEDIC SURGERY | Facility: CLINIC | Age: 73
End: 2018-05-03

## 2018-05-03 DIAGNOSIS — S52.692D OTHER CLOSED FRACTURE OF DISTAL END OF LEFT ULNA WITH ROUTINE HEALING, SUBSEQUENT ENCOUNTER: ICD-10-CM

## 2018-05-03 DIAGNOSIS — Z98.890 S/P MUSCULOSKELETAL SYSTEM SURGERY: Primary | ICD-10-CM

## 2018-05-03 PROCEDURE — 99024 POSTOP FOLLOW-UP VISIT: CPT | Performed by: PHYSICIAN ASSISTANT

## 2018-05-03 RX ORDER — DULOXETIN HYDROCHLORIDE 30 MG/1
CAPSULE, DELAYED RELEASE ORAL
COMMUNITY
Start: 2018-04-21

## 2018-05-03 NOTE — PROGRESS NOTES
[facility] [place] [facility] [place]    Subjective     Post-op Follow-up (3 week f/u; 5 weeks s/p ORIF (L) Ulna Shaft 3/30/18)       CASE Russell is a 73 y.o. female. Patient returns today for proximally 5 weeks status post open reduction internal fixation of left ulna shaft fracture.  With regards to her arm she has no complaints or issues.  No reported numbness or tingling into the extremity.  She's not requiring any type of pain medication.  Of significance, she was recently hospitalized April 22-30 for bacteremia/sepsis from a group A strep infection.  Patient is currently receiving infusion therapy for this.        Objective      Physical Exam  There were no vitals taken for this visit.    There is no height or weight on file to calculate BMI.        Ortho Exam  Left wrist  Skin: Surgical incision site is healing. Steri-Strips were placed at her last visit were removed today. No evidence of redness, warmth, drainage, swelling or infection noted.  Tenderness: None.  Motion: Wrist flexion 60°, extension 45°, elbow pronation 90°, elbow supination 45°.  Motor/sensory: Grossly intact C4-T1    Imaging  Ordered 3 views of the left wrist today.  Images were discussed with Dr. Lauren.  Fracture of the distal ulna appears to be healing appropriately.  Bone not yet visualized.  Hardware is intact.  Joint spaces are preserved.  Alignment is acceptable.  She chart for official report.    Assessment:  1. S/P musculoskeletal system surgery    2. Other closed fracture of distal end of left ulna with routine healing, subsequent encounter        Plan:  1. Discussion was had with the patient regarding exam, imaging, assessment and treatment options.  2. Patient to continue wearing her Exdos splint during the day but no longer needs to sleep in it.  3. Handout was given as review for range of motion exercises for her wrist and digits.  4. May nonweightbearing to left upper extremity.  5. Occupational therapy  referral was made.  6. Follow-up in 3 weeks for repeat evaluation to include pre-clinic imaging of the left wrist.  7. Questions and concerns answered.          Dee Solitario PA-C  05/03/18  10:08 AM

## 2018-05-05 LAB
BACTERIA SPEC AEROBE CULT: NORMAL
BACTERIA SPEC AEROBE CULT: NORMAL

## 2018-05-07 ENCOUNTER — LAB (OUTPATIENT)
Dept: LAB | Facility: HOSPITAL | Age: 73
End: 2018-05-07
Attending: INTERNAL MEDICINE

## 2018-05-07 ENCOUNTER — TRANSCRIBE ORDERS (OUTPATIENT)
Dept: LAB | Facility: HOSPITAL | Age: 73
End: 2018-05-07

## 2018-05-07 DIAGNOSIS — A40.0 SEPTICEMIA DUE TO GROUP A STREPTOCOCCUS (HCC): Primary | ICD-10-CM

## 2018-05-07 DIAGNOSIS — J03.00 ACUTE STREPTOCOCCAL TONSILLITIS, NOT SPECIFIED AS RECURRENT OR NOT: ICD-10-CM

## 2018-05-07 DIAGNOSIS — A40.0 SEPTICEMIA DUE TO GROUP A STREPTOCOCCUS (HCC): ICD-10-CM

## 2018-05-07 LAB
ALBUMIN SERPL-MCNC: 3.6 G/DL (ref 3.2–4.8)
ALBUMIN/GLOB SERPL: 1.1 G/DL (ref 1.5–2.5)
ALP SERPL-CCNC: 88 U/L (ref 25–100)
ALT SERPL W P-5'-P-CCNC: 14 U/L (ref 7–40)
ANION GAP SERPL CALCULATED.3IONS-SCNC: 10 MMOL/L (ref 3–11)
AST SERPL-CCNC: 14 U/L (ref 0–33)
BILIRUB SERPL-MCNC: 0.5 MG/DL (ref 0.3–1.2)
BUN BLD-MCNC: 7 MG/DL (ref 9–23)
BUN/CREAT SERPL: 10 (ref 7–25)
CALCIUM SPEC-SCNC: 9.6 MG/DL (ref 8.7–10.4)
CHLORIDE SERPL-SCNC: 103 MMOL/L (ref 99–109)
CO2 SERPL-SCNC: 26 MMOL/L (ref 20–31)
CREAT BLD-MCNC: 0.7 MG/DL (ref 0.6–1.3)
DEPRECATED RDW RBC AUTO: 47.8 FL (ref 37–54)
ERYTHROCYTE [DISTWIDTH] IN BLOOD BY AUTOMATED COUNT: 14.6 % (ref 11.3–14.5)
GFR SERPL CREATININE-BSD FRML MDRD: 82 ML/MIN/1.73
GLOBULIN UR ELPH-MCNC: 3.2 GM/DL
GLUCOSE BLD-MCNC: 109 MG/DL (ref 70–100)
HCT VFR BLD AUTO: 36.8 % (ref 34.5–44)
HGB BLD-MCNC: 12.5 G/DL (ref 11.5–15.5)
MCH RBC QN AUTO: 30.6 PG (ref 27–31)
MCHC RBC AUTO-ENTMCNC: 34 G/DL (ref 32–36)
MCV RBC AUTO: 90 FL (ref 80–99)
PLATELET # BLD AUTO: 407 10*3/MM3 (ref 150–450)
PMV BLD AUTO: 10.1 FL (ref 6–12)
POTASSIUM BLD-SCNC: 3.6 MMOL/L (ref 3.5–5.5)
PROT SERPL-MCNC: 6.8 G/DL (ref 5.7–8.2)
RBC # BLD AUTO: 4.09 10*6/MM3 (ref 3.89–5.14)
SODIUM BLD-SCNC: 139 MMOL/L (ref 132–146)
WBC NRBC COR # BLD: 10 10*3/MM3 (ref 3.5–10.8)

## 2018-05-07 PROCEDURE — 80053 COMPREHEN METABOLIC PANEL: CPT

## 2018-05-07 PROCEDURE — 36415 COLL VENOUS BLD VENIPUNCTURE: CPT

## 2018-05-07 PROCEDURE — 85027 COMPLETE CBC AUTOMATED: CPT

## 2018-05-31 ENCOUNTER — OFFICE VISIT (OUTPATIENT)
Dept: ORTHOPEDIC SURGERY | Facility: CLINIC | Age: 73
End: 2018-05-31

## 2018-05-31 DIAGNOSIS — Z98.890 S/P MUSCULOSKELETAL SYSTEM SURGERY: Primary | ICD-10-CM

## 2018-05-31 DIAGNOSIS — S52.692D OTHER CLOSED FRACTURE OF DISTAL END OF LEFT ULNA WITH ROUTINE HEALING, SUBSEQUENT ENCOUNTER: ICD-10-CM

## 2018-05-31 PROCEDURE — 99024 POSTOP FOLLOW-UP VISIT: CPT | Performed by: PHYSICIAN ASSISTANT

## 2018-05-31 NOTE — PROGRESS NOTES
[facility] [place] [facility]    Subjective     Post-op (4 week follow up, 9 weeks status post: Left Ulna Shaft ORIF 3/30/18)       CASE Russell is a 73 y.o. female. She returns today approximately 9 weeks out status post left distal ulna shaft open reduction, internal fixation.  She's doing quite well with regards to her left wrist.  She reports no pain, numbness or tingling into the extremity.  No reported fever, chills, night sweats or other constitutional symptoms.  She is currently weightbearing on the left upper extremity through use of a walker.          Objective      Physical Exam  There were no vitals taken for this visit.    There is no height or weight on file to calculate BMI.        Ortho Exam  Left wrist  Skin: Surgical incision site is well healed. No redness, warmth, drainage, swelling or infection noted.  Tenderness: None.  Motion: Wrist flexion 75°, extension 75°, elbow pronation 90°, elbow supination 60°.  Able to make a composite fist without difficulty.  Motor/sensory: Grossly intact C4-T1    Imaging  Imaging Results (last 24 hours)     Procedure Component Value Units Date/Time    XR Wrist 3+ View Left [[id number]] Resulted:  05/31/18 1004     Updated:  05/31/18 1005    Narrative:       Left Wrist X-Ray    Indication: Pain    Views:  AP, Lateral, and Oblique     Comparison: Left wrist 5/3/2018    Findings:  The patient's distal ulna fracture status post ORIF appears to be healing   appropriately.  The main fracture line is becoming more difficult to see   consistent with healing of the fracture.  Plate remains intact.  No bony lesion  Normal soft tissues  Normal joint spaces    Impression: Healing left distal ulna shaft fracture status post ORIF          Ordered 3 views of the left wrist today.  Images reviewed by Dr. Lauren.  Continued healing noted to the distal ulna.  Hardware intact without complication.  See chart for official report.      Assessment:  1. S/P  musculoskeletal system surgery    2. Other closed fracture of distal end of left ulna with routine healing, subsequent encounter        Plan:  1. Discussion was had with patient regarding exam, imaging, assessment and treatment options.  2. She'll continue working on range of motion exercises to both wrist and digits.   3. She may continue with weightbearing as tolerated secondary to the fact that she needs to use her walker.  Avoid lifting greater than 5 pounds at this time.  4. Follow-up 6 weeks for repeat evaluation to include pre-clinic imaging of the left wrist.  5. Questions and concerns answered.           Dee Solitario PA-C  05/31/18  10:46 AM

## 2018-07-12 ENCOUNTER — OFFICE VISIT (OUTPATIENT)
Dept: ORTHOPEDIC SURGERY | Facility: CLINIC | Age: 73
End: 2018-07-12

## 2018-07-12 VITALS — OXYGEN SATURATION: 98 % | HEIGHT: 57 IN | WEIGHT: 128.75 LBS | BODY MASS INDEX: 27.78 KG/M2 | HEART RATE: 93 BPM

## 2018-07-12 DIAGNOSIS — Z98.890 S/P MUSCULOSKELETAL SYSTEM SURGERY: Primary | ICD-10-CM

## 2018-07-12 DIAGNOSIS — S52.692D OTHER CLOSED FRACTURE OF DISTAL END OF LEFT ULNA WITH ROUTINE HEALING, SUBSEQUENT ENCOUNTER: ICD-10-CM

## 2018-07-12 PROCEDURE — 99213 OFFICE O/P EST LOW 20 MIN: CPT | Performed by: PHYSICIAN ASSISTANT

## 2018-07-12 NOTE — PROGRESS NOTES
"    [facility] [place] [facility] Note    Subjective     Follow-up (6 weeks- status post: Left Ulna Shaft ORIF 3/30/18)       CASE Russell is a 73 y.o. female. She returns today just over 3 months status post left distal ulna shaft open reduction, internal fixation.  She's doing well.  She denies any pain, numbness or tingling.  She reports that she was returned to all daily activities without issue.            Objective      Physical Exam  Pulse 93   Ht 144.8 cm (57.01\")   Wt 58.4 kg (128 lb 12 oz)   SpO2 98%   BMI 27.85 kg/m²     Body mass index is 27.85 kg/m².        Ortho Exam  Peripheral Vascular   Bilateral Upper Extremity    No cyanotic nail beds    Pink nail beds and rapid capillary refill   Palpation    Radial Pulse - Bilaterally normal    Neurologic   Sensory: Light touch intact- Right and left hand    Left Upper Extremity    Left wrist extensors: 5/5    Left wrist flexors: 5/5    Left intrinsics: 5/5    Musculoskeletal   Left Elbow    Forearm supination: AROM - 90 degrees    Forearm pronation: AROM - 90 degrees     Inspection and Palpation   Left Wrist    Skin - incision well-healed without redness, warmth, drainage or evidence of infection.    Tenderness - none    Swelling - none    Crepitus - none    Muscle tone - no atrophy     ROJM:   Left Wrist    Flexion: AROM - 90 degrees    Extension: AROM - 90 degrees     Deformities, Malalignments, Discrepancies    None     Strength and Tone    Right  strength: good    Left  strength: good      Imaging/Studies  Imaging Results (last 24 hours)     Procedure Component Value Units Date/Time    XR Wrist 3+ View Left [[id number]] Resulted:  07/12/18 0832     Updated:  07/12/18 0837    Narrative:       Left Wrist X-Ray    Indication: s/p ORIF    Views:  AP, Lateral, and Oblique     Comparison: Left wrist 5/31/2018    Findings:  The fracture of the distal ulna appears to be healing appropriately.  New   bone is visualized.  The hardware is " intact.    Normal soft tissues  Normal joint spaces  Alignment appears acceptable.      Impression:  Fracture of the left distal ulna s/p ORIF with complete   healing.      Ordered 3 views of the left wrist.  Edges were reviewed by Dr. Lauren.  He'll distal ulna fracture status post ORIF with hardware intact.  No evidence of failure or loosening.  Alignment remains acceptable.  See chart for official report.    Assessment:  1. S/P musculoskeletal system surgery    2. Other closed fracture of distal end of left ulna with routine healing, subsequent encounter        Plan:  1. Discussion was had with the patient regarding exam, imaging, assessment and treatment options.  2. Continue advancing activity as tolerated.  3. Follow up as needed. With her history of sepsis/bacteremia due to group A strep, if the time she notices any pain redness warmth swelling or any other issues with her left wrist she is return to clinic immediately for evaluation.  4. Questions and concerns were answered.          Dee Solitario PA-C  07/12/18  8:52 AM

## 2018-10-24 ENCOUNTER — TRANSCRIBE ORDERS (OUTPATIENT)
Dept: ADMINISTRATIVE | Facility: HOSPITAL | Age: 73
End: 2018-10-24

## 2018-10-24 DIAGNOSIS — Z78.0 POST-MENOPAUSAL: Primary | ICD-10-CM

## 2018-10-24 DIAGNOSIS — Z12.31 VISIT FOR SCREENING MAMMOGRAM: ICD-10-CM

## 2018-10-30 ENCOUNTER — TRANSCRIBE ORDERS (OUTPATIENT)
Dept: ADMINISTRATIVE | Facility: HOSPITAL | Age: 73
End: 2018-10-30

## 2018-10-30 DIAGNOSIS — Z78.0 POSTMENOPAUSAL: Primary | ICD-10-CM

## 2018-11-05 ENCOUNTER — TRANSCRIBE ORDERS (OUTPATIENT)
Dept: ADMINISTRATIVE | Facility: HOSPITAL | Age: 73
End: 2018-11-05

## 2018-11-05 DIAGNOSIS — Z78.0 POSTMENOPAUSAL: Primary | ICD-10-CM

## 2018-12-12 ENCOUNTER — APPOINTMENT (OUTPATIENT)
Dept: BONE DENSITY | Facility: HOSPITAL | Age: 73
End: 2018-12-12

## 2018-12-13 ENCOUNTER — APPOINTMENT (OUTPATIENT)
Dept: MAMMOGRAPHY | Facility: HOSPITAL | Age: 73
End: 2018-12-13

## 2018-12-13 ENCOUNTER — HOSPITAL ENCOUNTER (OUTPATIENT)
Dept: MAMMOGRAPHY | Facility: HOSPITAL | Age: 73
Discharge: HOME | End: 2018-12-13

## 2018-12-13 ENCOUNTER — HOSPITAL ENCOUNTER (OUTPATIENT)
Dept: BONE DENSITY | Facility: HOSPITAL | Age: 73
Discharge: HOME | End: 2018-12-13
Admitting: PHYSICIAN ASSISTANT

## 2018-12-13 DIAGNOSIS — Z78.0 POST-MENOPAUSAL: ICD-10-CM

## 2018-12-13 DIAGNOSIS — Z78.0 POSTMENOPAUSAL: ICD-10-CM

## 2018-12-13 DIAGNOSIS — Z12.31 VISIT FOR SCREENING MAMMOGRAM: ICD-10-CM

## 2018-12-13 PROCEDURE — 77080 DXA BONE DENSITY AXIAL: CPT

## 2018-12-13 PROCEDURE — 77067 SCR MAMMO BI INCL CAD: CPT

## 2018-12-13 PROCEDURE — 77063 BREAST TOMOSYNTHESIS BI: CPT

## 2018-12-13 PROCEDURE — 77063 BREAST TOMOSYNTHESIS BI: CPT | Performed by: RADIOLOGY

## 2018-12-13 PROCEDURE — 77067 SCR MAMMO BI INCL CAD: CPT | Performed by: RADIOLOGY

## 2020-05-20 ENCOUNTER — TRANSCRIBE ORDERS (OUTPATIENT)
Dept: ADMINISTRATIVE | Facility: HOSPITAL | Age: 75
End: 2020-05-20

## 2020-05-20 DIAGNOSIS — Z12.31 VISIT FOR SCREENING MAMMOGRAM: Primary | ICD-10-CM

## 2020-08-12 ENCOUNTER — HOSPITAL ENCOUNTER (OUTPATIENT)
Dept: MAMMOGRAPHY | Facility: HOSPITAL | Age: 75
Discharge: HOME | End: 2020-08-12
Admitting: PHYSICIAN ASSISTANT

## 2020-08-12 DIAGNOSIS — Z12.31 VISIT FOR SCREENING MAMMOGRAM: ICD-10-CM

## 2020-08-12 PROCEDURE — 77063 BREAST TOMOSYNTHESIS BI: CPT | Performed by: RADIOLOGY

## 2020-08-12 PROCEDURE — 77067 SCR MAMMO BI INCL CAD: CPT | Performed by: RADIOLOGY

## 2020-08-12 PROCEDURE — 77063 BREAST TOMOSYNTHESIS BI: CPT

## 2020-08-12 PROCEDURE — 77067 SCR MAMMO BI INCL CAD: CPT

## 2020-12-28 PROCEDURE — U0004 COV-19 TEST NON-CDC HGH THRU: HCPCS | Performed by: NURSE PRACTITIONER

## 2020-12-30 ENCOUNTER — HOSPITAL ENCOUNTER (EMERGENCY)
Facility: HOSPITAL | Age: 75
Discharge: HOME | End: 2020-12-30
Attending: EMERGENCY MEDICINE | Admitting: EMERGENCY MEDICINE

## 2020-12-30 VITALS
OXYGEN SATURATION: 95 % | HEART RATE: 88 BPM | BODY MASS INDEX: 30.85 KG/M2 | TEMPERATURE: 98.2 F | RESPIRATION RATE: 18 BRPM | HEIGHT: 57 IN | WEIGHT: 143 LBS | DIASTOLIC BLOOD PRESSURE: 107 MMHG | SYSTOLIC BLOOD PRESSURE: 175 MMHG

## 2020-12-30 DIAGNOSIS — I10 ESSENTIAL HYPERTENSION: ICD-10-CM

## 2020-12-30 DIAGNOSIS — J06.9 VIRAL UPPER RESPIRATORY INFECTION: Primary | ICD-10-CM

## 2020-12-30 DIAGNOSIS — B09 VIRAL EXANTHEM: ICD-10-CM

## 2020-12-30 LAB — SARS-COV-2 RNA RESP QL NAA+PROBE: NOT DETECTED

## 2020-12-30 PROCEDURE — U0004 COV-19 TEST NON-CDC HGH THRU: HCPCS | Performed by: EMERGENCY MEDICINE

## 2020-12-30 PROCEDURE — 63710000001 PREDNISONE PER 1 MG: Performed by: EMERGENCY MEDICINE

## 2020-12-30 PROCEDURE — C9803 HOPD COVID-19 SPEC COLLECT: HCPCS | Performed by: EMERGENCY MEDICINE

## 2020-12-30 PROCEDURE — 99284 EMERGENCY DEPT VISIT MOD MDM: CPT

## 2020-12-30 RX ORDER — PREDNISONE 20 MG/1
60 TABLET ORAL ONCE
Status: COMPLETED | OUTPATIENT
Start: 2020-12-30 | End: 2020-12-30

## 2020-12-30 RX ORDER — CLONIDINE HYDROCHLORIDE 0.1 MG/1
0.1 TABLET ORAL ONCE
Status: COMPLETED | OUTPATIENT
Start: 2020-12-30 | End: 2020-12-30

## 2020-12-30 RX ORDER — PREDNISONE 20 MG/1
20 TABLET ORAL 3 TIMES DAILY
Qty: 15 TABLET | Refills: 0 | OUTPATIENT
Start: 2020-12-30 | End: 2022-02-26

## 2020-12-30 RX ADMIN — CLONIDINE HYDROCHLORIDE 0.1 MG: 0.1 TABLET ORAL at 06:22

## 2020-12-30 RX ADMIN — PREDNISONE 60 MG: 20 TABLET ORAL at 05:27

## 2021-11-04 ENCOUNTER — TRANSCRIBE ORDERS (OUTPATIENT)
Dept: ADMINISTRATIVE | Facility: HOSPITAL | Age: 76
End: 2021-11-04

## 2021-11-04 DIAGNOSIS — Z12.31 VISIT FOR SCREENING MAMMOGRAM: Primary | ICD-10-CM

## 2021-12-03 ENCOUNTER — HOSPITAL ENCOUNTER (OUTPATIENT)
Dept: MAMMOGRAPHY | Facility: HOSPITAL | Age: 76
Discharge: HOME | End: 2021-12-03
Admitting: PHYSICIAN ASSISTANT

## 2021-12-03 DIAGNOSIS — Z12.31 VISIT FOR SCREENING MAMMOGRAM: ICD-10-CM

## 2021-12-03 PROCEDURE — 77067 SCR MAMMO BI INCL CAD: CPT | Performed by: RADIOLOGY

## 2021-12-03 PROCEDURE — 77063 BREAST TOMOSYNTHESIS BI: CPT

## 2021-12-03 PROCEDURE — 77067 SCR MAMMO BI INCL CAD: CPT

## 2021-12-03 PROCEDURE — 77063 BREAST TOMOSYNTHESIS BI: CPT | Performed by: RADIOLOGY

## 2022-02-25 ENCOUNTER — APPOINTMENT (OUTPATIENT)
Dept: GENERAL RADIOLOGY | Facility: HOSPITAL | Age: 77
End: 2022-02-25

## 2022-02-25 PROCEDURE — 99283 EMERGENCY DEPT VISIT LOW MDM: CPT

## 2022-02-25 PROCEDURE — 71101 X-RAY EXAM UNILAT RIBS/CHEST: CPT

## 2022-02-26 ENCOUNTER — HOSPITAL ENCOUNTER (EMERGENCY)
Facility: HOSPITAL | Age: 77
Discharge: HOME | End: 2022-02-26
Attending: EMERGENCY MEDICINE | Admitting: EMERGENCY MEDICINE

## 2022-02-26 VITALS
HEIGHT: 55 IN | HEART RATE: 88 BPM | BODY MASS INDEX: 32.86 KG/M2 | WEIGHT: 142 LBS | OXYGEN SATURATION: 98 % | DIASTOLIC BLOOD PRESSURE: 74 MMHG | RESPIRATION RATE: 18 BRPM | SYSTOLIC BLOOD PRESSURE: 152 MMHG | TEMPERATURE: 97.6 F

## 2022-02-26 DIAGNOSIS — S46.912A STRAIN OF LEFT SHOULDER, INITIAL ENCOUNTER: ICD-10-CM

## 2022-02-26 DIAGNOSIS — R07.81 RIB PAIN: ICD-10-CM

## 2022-02-26 DIAGNOSIS — S20.211A CHEST WALL CONTUSION, RIGHT, INITIAL ENCOUNTER: ICD-10-CM
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
## 2022-02-26 RX ORDER — IBUPROFEN 400 MG/1
400 TABLET ORAL ONCE
Status: COMPLETED | OUTPATIENT
Start: 2022-02-26 | End: 2022-02-26

## 2022-02-26 RX ORDER — ACETAMINOPHEN 500 MG
1000 TABLET ORAL ONCE
Status: COMPLETED | OUTPATIENT
Start: 2022-02-26 | End: 2022-02-26

## 2022-02-26 RX ADMIN — IBUPROFEN 400 MG: 400 TABLET, FILM COATED ORAL at 01:35

## 2022-02-26 RX ADMIN — ACETAMINOPHEN 1000 MG: 500 TABLET, FILM COATED ORAL at 01:41

## 2024-08-19 ENCOUNTER — APPOINTMENT (OUTPATIENT)
Dept: CT IMAGING | Facility: HOSPITAL | Age: 79
End: 2024-08-19
Payer: MEDICARE

## 2024-08-19 VITALS
BODY MASS INDEX: 32.4 KG/M2 | HEART RATE: 84 BPM | RESPIRATION RATE: 16 BRPM | OXYGEN SATURATION: 95 % | DIASTOLIC BLOOD PRESSURE: 118 MMHG | TEMPERATURE: 98.1 F | WEIGHT: 140 LBS | HEIGHT: 55 IN | SYSTOLIC BLOOD PRESSURE: 180 MMHG

## 2024-08-19 PROCEDURE — 72125 CT NECK SPINE W/O DYE: CPT

## 2024-08-19 PROCEDURE — 70486 CT MAXILLOFACIAL W/O DYE: CPT

## 2024-08-19 PROCEDURE — 70450 CT HEAD/BRAIN W/O DYE: CPT

## 2024-08-19 PROCEDURE — 99284 EMERGENCY DEPT VISIT MOD MDM: CPT

## 2024-08-19 RX ORDER — LIDOCAINE HYDROCHLORIDE 10 MG/ML
10 INJECTION, SOLUTION EPIDURAL; INFILTRATION; INTRACAUDAL; PERINEURAL ONCE
Status: COMPLETED | OUTPATIENT
Start: 2024-08-19 | End: 2024-08-20

## 2024-08-20 ENCOUNTER — APPOINTMENT (OUTPATIENT)
Dept: CT IMAGING | Facility: HOSPITAL | Age: 79
End: 2024-08-20
Payer: MEDICARE

## 2024-08-20 ENCOUNTER — HOSPITAL ENCOUNTER (EMERGENCY)
Facility: HOSPITAL | Age: 79
Discharge: HOME | End: 2024-08-20
Attending: EMERGENCY MEDICINE
Payer: MEDICARE

## 2024-08-20 ENCOUNTER — APPOINTMENT (OUTPATIENT)
Dept: GENERAL RADIOLOGY | Facility: HOSPITAL | Age: 79
End: 2024-08-20
Payer: MEDICARE
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
## 2024-08-20 DIAGNOSIS — S01.81XA LACERATION OF FOREHEAD, INITIAL ENCOUNTER: ICD-10-CM

## 2024-08-20 DIAGNOSIS — S62.657A CLOSED NONDISPLACED FRACTURE OF MIDDLE PHALANX OF LEFT LITTLE FINGER, INITIAL ENCOUNTER: ICD-10-CM

## 2024-08-20 DIAGNOSIS — S16.1XXA ACUTE STRAIN OF NECK MUSCLE, INITIAL ENCOUNTER: ICD-10-CM

## 2024-08-20 DIAGNOSIS — I10 ELEVATED BLOOD PRESSURE READING WITH DIAGNOSIS OF HYPERTENSION: ICD-10-CM

## 2024-08-20 DIAGNOSIS — S39.012A STRAIN OF LUMBAR REGION, INITIAL ENCOUNTER: ICD-10-CM

## 2024-08-20 DIAGNOSIS — Z87.39 HISTORY OF OSTEOPOROSIS: ICD-10-CM

## 2024-08-20 DIAGNOSIS — S09.90XA INJURY OF HEAD, INITIAL ENCOUNTER: ICD-10-CM

## 2024-08-20 PROCEDURE — 73140 X-RAY EXAM OF FINGER(S): CPT

## 2024-08-20 PROCEDURE — 72131 CT LUMBAR SPINE W/O DYE: CPT

## 2024-08-20 RX ORDER — ACETAMINOPHEN 500 MG
1000 TABLET ORAL ONCE
Status: COMPLETED | OUTPATIENT
Start: 2024-08-20 | End: 2024-08-20

## 2024-08-20 RX ADMIN — LIDOCAINE HYDROCHLORIDE 10 ML: 10 INJECTION, SOLUTION EPIDURAL; INFILTRATION; INTRACAUDAL; PERINEURAL at 00:15

## 2024-08-20 RX ADMIN — ACETAMINOPHEN 1000 MG: 500 TABLET ORAL at 01:35

## 2024-08-20 NOTE — ED PROVIDER NOTES
Subjective   History of Present Illness  This is a 79-year-old female that presents to the ER with her daughter after a mechanical fall at home.  Patient has a large [place] dog and she was in the bathroom and the dog ran in front of her which caused her to lose her balance.  She fell forward and hit the floor.  She struck her head on the metal floor register.  She denied any loss of consciousness.  She has a large frontal scalp laceration to the mid forehead.  She also reports pain to bilateral aspects of neck and she jammed her left fifth finger and has some localized bruising but no obvious deformity.  Patient does have recent T8 kyphoplasty on 3/29/2024 per Dr. Gleason at [facility]. She denies any mid or lower back pain or pelvic/hip pain.  She denies any pain to the bilateral lower extremities.  Patient does not utilize any daily aspirin or chronic anticoagulation.  Past medical history is significant for hypertension, hyperlipidemia, osteoporosis, and osteoarthritis.  Patient denies any recent symptoms of illness.  She says fall was strictly mechanical.  No other concerns at this time.  Tetanus status is up-to-date.    History provided by:  Patient and relative (Daughter)  Fall  Mechanism of injury: fall    Injury location:  Head/neck  Head/neck injury location:  Scalp, L neck and R neck  Incident location:  Home (Bathroom)  Time since incident:  2 hours  Arrived directly from scene: yes    Fall:     Fall occurred:  Standing    Impact surface:  Hard floor (Pt's face hit the metal floor register)    Point of impact:  Head  Suspicion of alcohol use: no    Suspicion of drug use: no    Tetanus status:  Up to date  Associated symptoms: neck pain    Associated symptoms: no abdominal pain, no back pain, no chest pain, no difficulty breathing, no headaches, no loss of consciousness, no nausea, no seizures and no vomiting    Risk factors: no anticoagulation therapy (No daily ASA use or other chronic  anticoagulation)        Review of Systems   Constitutional: Negative.    HENT: Negative.     Eyes: Negative.  Negative for visual disturbance.   Respiratory: Negative.     Cardiovascular: Negative.  Negative for chest pain, palpitations and leg swelling.   Gastrointestinal: Negative.  Negative for abdominal pain, nausea and vomiting.   Genitourinary: Negative.  Negative for dysuria, flank pain, frequency and urgency.   Musculoskeletal:  Positive for arthralgias (left 5th finger s/p fall), gait problem (unsteady gait. Pt tripped over large [place] villatoro dog) and neck pain. Negative for back pain and neck stiffness.   Skin:  Positive for color change (bruising to left 5th finger) and wound (frontal scalp laceration).   Neurological:  Negative for dizziness, seizures, loss of consciousness, syncope (Head injury, but no LOC), weakness and headaches.   All other systems reviewed and are negative.      Past Medical History:   Diagnosis Date    High cholesterol     History of pelvic fracture 1/8/2018    Hyperlipidemia     Hypertension     Osteoarthritis     Osteoporosis        No Known Allergies    Past Surgical History:   Procedure Laterality Date    COLON SURGERY      HYSTERECTOMY      SHOULDER SURGERY      rotator cuff (left)    TONSILLECTOMY         Family History   Problem Relation Age of Onset    Lymphoma Mother     Melanoma Mother     Cancer Mother     Osteoarthritis Mother     Hypertension Mother     Stroke Father     Heart disease Father     Heart failure Father     Hypertension Father     Heart attack Father     Breast cancer Neg Hx     Ovarian cancer Neg Hx        Social History     Socioeconomic History    Marital status:    Tobacco Use    Smoking status: Never    Smokeless tobacco: Never   Vaping Use    Vaping status: Never Used   Substance and Sexual Activity    Alcohol use: No    Drug use: No    Sexual activity: Defer     Partners: Male           Objective   Physical Exam  Vitals and nursing note  reviewed.   Constitutional:       General: She is not in acute distress.     Appearance: Normal appearance. She is not ill-appearing, toxic-appearing or diaphoretic.      Comments: Pleasant talkative.  No acute sign of pain or distress.   HENT:      Head: Normocephalic and atraumatic. Contusion and laceration present. No abrasion.      Jaw: There is normal jaw occlusion. No tenderness or pain on movement.        Nose: Nose normal. No nasal deformity, signs of injury, laceration or nasal tenderness.      Mouth/Throat:      Mouth: Mucous membranes are moist. No injury.      Dentition: No dental tenderness.      Pharynx: Oropharynx is clear.      Comments: No oral injury.  Patient has dentures in place to the upper mouth and no loose teeth palpable to the lower mouth.  Eyes:      Extraocular Movements: Extraocular movements intact.      Right eye: Normal extraocular motion and no nystagmus.      Left eye: Normal extraocular motion and no nystagmus.      Conjunctiva/sclera: Conjunctivae normal.      Pupils: Pupils are equal, round, and reactive to light.      Comments: Pupils equal round reactive to light.  Extraocular movements intact   Neck:      Comments: No particular C-spine tenderness.  Tenderness to bilateral cervical myofascia with painful range of motion.  Cardiovascular:      Rate and Rhythm: Normal rate and regular rhythm. No extrasystoles are present.     Pulses: Normal pulses.      Heart sounds: Normal heart sounds.      Comments: Regular rate and rhythm.  No ectopy.  No pedal edema to lower extremities  Pulmonary:      Effort: Pulmonary effort is normal.      Breath sounds: Normal breath sounds.      Comments: Lungs are clear to auscultation bilaterally  Chest:      Chest wall: No deformity, swelling, tenderness or crepitus.      Comments: No chest wall tenderness.  No bruising or sign of trauma.  Abdominal:      General: Bowel sounds are normal. There is no distension. There are no signs of injury.       Palpations: Abdomen is soft.      Tenderness: There is no abdominal tenderness. There is no right CVA tenderness, left CVA tenderness, guarding or rebound.      Comments: Abdomen soft without distention or rigidity.  Nontender to palpation.  Active bowel sounds all 4 quadrants.  No flank or CVA tenderness.   Musculoskeletal:         General: Normal range of motion.      Cervical back: Normal range of motion and neck supple. Pain with movement and muscular tenderness present. No spinous process tenderness.      Right lower leg: No edema.      Left lower leg: No edema.      Comments: No particular C-spine tenderness.  No T or L/S spinal tenderness.  No bruising or soft tissue edema or sign of visible trauma.  Patient has tenderness to the left fifth phalanx with some localized bruising but no obvious deformity.  Painful flexion.  No other tenderness to bilateral upper extremities or lower extremities.  No pelvic or hip tenderness.  Normal gait.   Skin:     General: Skin is warm and dry.      Findings: Bruising and laceration present.      Comments: See HEENT for details on laceration    Neurological:      General: No focal deficit present.      Mental Status: She is alert and oriented to person, place, and time.      Cranial Nerves: Cranial nerves 2-12 are intact.      Sensory: Sensation is intact.      Motor: Motor function is intact.      Coordination: Coordination is intact.      Gait: Gait is intact.      Comments: Alert and oriented x 3.  Neuro intact and nonfocal   Psychiatric:         Mood and Affect: Mood and affect normal.         Speech: Speech normal.         Behavior: Behavior normal. Behavior is cooperative.         Thought Content: Thought content normal.         Cognition and Memory: Cognition and memory normal.         Judgment: Judgment normal.         Laceration Repair    Date/Time: 8/20/2024 1:16 AM    Performed by: Kaila Dacosta PA-C  Authorized by: Karen Zambrano MD    Consent:     Consent  obtained:  Verbal    Consent given by:  Patient    Risks discussed:  Infection, pain, poor cosmetic result and poor wound healing  Universal protocol:     Patient identity confirmed:  Verbally with patient  Anesthesia:     Anesthesia method:  Local infiltration    Local anesthetic:  Lidocaine 1% w/o epi  Laceration details:     Location:  Face    Face location:  Forehead    Length (cm):  3    Depth (mm):  2  Pre-procedure details:     Preparation:  Patient was prepped and draped in usual sterile fashion and imaging obtained to evaluate for foreign bodies  Exploration:     Limited defect created (wound extended): no      Imaging obtained comment:  CT head/face    Imaging outcome: foreign body not noted      Wound exploration: wound explored through full range of motion and entire depth of wound visualized      Wound extent: no foreign bodies/material noted and no underlying fracture noted      Contaminated: no    Treatment:     Area cleansed with:  Povidone-iodine and saline    Amount of cleaning:  Standard    Irrigation solution:  Sterile saline    Irrigation method: 4x4s.    Visualized foreign bodies/material removed: no      Debridement:  None    Scar revision: no    Skin repair:     Repair method:  Sutures    Suture size:  6-0    Suture material:  Nylon    Suture technique:  Simple interrupted    Number of sutures:  7  Approximation:     Approximation:  Close  Repair type:     Repair type:  Complex  Post-procedure details:     Dressing:  Open (no dressing)    Procedure completion:  Tolerated well, no immediate complications             ED Course  ED Course as of 08/20/24 0129   Tue Aug 20, 2024   0124 Blood pressure was initially elevated at 180/118 with personal history of hypertension.  CT of the brain without contrast revealed no acute intracranial abnormality.  Soft tissue contusions and left frontal scalp laceration.  CT of the cervical spine revealed no acute osseous abnormality.  Moderate cervical  spondylosis with mild left-sided neuroforaminal narrowing at C3-C4 level and straightening of the cervical lordosis and 2 mm anterolisthesis of C3 on C4.  X-rays of the left fifth phalanx revealed a small avulsion fracture at the head and base of the fifth digit middle phalanx.  We applied a aluminum Gelfoam splint to help with stabilization.  There was no angulation or deformity on physical exam.  Patient had increased low back pain during the ER course and requested for us to assess her lumbar spine.  CT of the lumbar spine revealed no acute osseous abnormality.  Osteopenia and moderate to severe multilevel disc and facet joint degeneration with mild dextroscoliosis and multilevel spondylolisthesis.  I applied #7 sutures to forehead laceration.  Patient had good closure and recommend suture removal in 7 days.  We will give head injury instructions.  Tetanus status is up-to-date.  Recommend close PCP follow-up for recheck.  Return to the ER if worsening symptoms.  Patient and daughter very appreciative of her care and expressed readiness to be discharged. [FC]   0128 CT of the facial bones also revealed no acute facial bone fracture. [FC]      ED Course User Index  [FC] Kaila Dacosta, [place]                                      No results found for this or any previous visit (from the past 24 hour(s)).  Note: In addition to lab results from this visit, the labs listed above may include labs taken at another facility or during a different encounter within the last 24 hours. Please correlate lab times with ED admission and discharge times for further clarification of the services performed during this visit.    CT Lumbar Spine Without Contrast   Final Result   Impression:   1.No acute osseous abnormality.   2.Osteopenia.   3.Moderate to severe multilevel disc and facet joint degeneration with mild dextroscoliosis and multilevel spondylolisthesis.            Electronically Signed: Delvin Singh MD     8/20/2024 1:01  "AM EDT     Workstation ID: [id number]      XR Finger 2+ View Left   Final Result   Impression:   Small avulsion fractures at the head and base of the fifth digit middle phalanx.            Electronically Signed: Delvin Singh MD     8/20/2024 12:39 AM EDT     Workstation ID: [id number]      CT Head Without Contrast   Final Result   Impression:   1.No acute intracranial abnormality.   2.Moderate chronic small vessel ischemic change.   3.Soft tissue contusions and left frontal scalp laceration.            Electronically Signed: Delvin Singh MD     8/19/2024 11:46 PM EDT     Workstation ID: [id number]      CT Facial Bones Without Contrast   Final Result   Impression:   1.No acute osseous abnormality.   2.Minor left frontal scalp contusion.            Electronically Signed: Delvin Singh MD     8/19/2024 11:52 PM EDT     Workstation ID: [id number]      CT Cervical Spine Without Contrast   Final Result   Impression:   1.No acute osseous abnormality.   2.Moderate cervical spondylosis with mild left-sided neuroforaminal narrowing at the C3-C4 level.   3.Straightening of the cervical lordosis and 2 mm anterolisthesis of C3 on C4.            Electronically Signed: Delvin Singh MD     8/20/2024 12:01 AM EDT     Workstation ID: [id number]        Vitals:    08/19/24 2146   BP: (!) 180/118   BP Location: Right arm   Patient Position: Sitting   Pulse: 84   Resp: 16   Temp: 98.1 °F (36.7 °C)   TempSrc: Oral   SpO2: 95%   Weight: 63.5 kg (140 lb)   Height: 139.7 cm (55\")     Medications   acetaminophen (TYLENOL) tablet 1,000 mg (has no administration in time range)   lidocaine PF 1% (XYLOCAINE) injection 10 mL (10 mL Infiltration Given by Other 8/20/24 0015)     ECG/EMG Results (last 24 hours)       ** No results found for the last 24 hours. **          No orders to display              Medical Decision Making  Amount and/or Complexity of Data Reviewed  Radiology: ordered.    Risk  OTC drugs.  Prescription drug management.        Final " diagnoses:   Fall, initial encounter   Injury of head, initial encounter   Laceration of forehead, initial encounter   Acute strain of neck muscle, initial encounter   Strain of lumbar region, initial encounter   Closed nondisplaced fracture of middle phalanx of left little finger, initial encounter   History of osteoporosis   Elevated blood pressure reading with diagnosis of hypertension       ED Disposition  ED Disposition       ED Disposition   Discharge    Condition   Stable    Comment   --               Eric Brunson,   [address] [address]  [phone]    Schedule an appointment as soon as possible for a visit in 2 days  Close PCP follow-up for [facility] EMERGENCY DEPARTMENT  [address] [facility] [address] [phone]    If symptoms worsen         Medication List      No changes were made to your prescriptions during this visit.            Kaila Dacosta PA-C  08/20/24 0129

## 2024-08-20 NOTE — DISCHARGE INSTRUCTIONS
ER evaluation revealed normal CT of the brain and facial bones revealed no acute abnormalities.  CT of the cervical spine and lumbar spine revealed degenerative changes and osteopenia but no acute compression fracture or bony abnormality.  X-ray of the left fifth finger revealed a small avulsion fracture at the mid phalanx.  We applied a finger splint to help with stabilization and comfort.  We applied #7 sutures to forehead laceration and recommend suture removal in 7 days.  We will give head injury instructions.  Tetanus status is up-to-date.  Recommend Tylenol every 4-6 hours as needed for pain and close PCP follow-up for recheck within 24 to 48 hours.  Continue with all other current medical management.  Return to the ER if worsening symptoms.

## 2024-08-31 ENCOUNTER — HOSPITAL ENCOUNTER (EMERGENCY)
Facility: HOSPITAL | Age: 79
Discharge: HOME | End: 2024-08-31
Payer: MEDICARE

## 2024-08-31 VITALS
BODY MASS INDEX: 32.4 KG/M2 | SYSTOLIC BLOOD PRESSURE: 165 MMHG | RESPIRATION RATE: 14 BRPM | OXYGEN SATURATION: 94 % | WEIGHT: 140 LBS | TEMPERATURE: 97.9 F | HEIGHT: 55 IN | DIASTOLIC BLOOD PRESSURE: 79 MMHG | HEART RATE: 71 BPM

## 2024-08-31 PROCEDURE — 99202 OFFICE O/P NEW SF 15 MIN: CPT

## 2025-07-27 ENCOUNTER — APPOINTMENT (OUTPATIENT)
Dept: GENERAL RADIOLOGY | Facility: HOSPITAL | Age: 80
End: 2025-07-27
Payer: MEDICARE

## 2025-07-27 ENCOUNTER — APPOINTMENT (OUTPATIENT)
Dept: CARDIOLOGY | Facility: HOSPITAL | Age: 80
End: 2025-07-27
Payer: MEDICARE

## 2025-07-27 ENCOUNTER — HOSPITAL ENCOUNTER (INPATIENT)
Facility: HOSPITAL | Age: 80
LOS: 2 days | Discharge: HOME | End: 2025-07-31
Attending: EMERGENCY MEDICINE | Admitting: INTERNAL MEDICINE
Payer: MEDICARE

## 2025-07-27 DIAGNOSIS — I10 PRIMARY HYPERTENSION: ICD-10-CM

## 2025-07-27 DIAGNOSIS — J44.1 COPD WITH ACUTE EXACERBATION: ICD-10-CM

## 2025-07-27 DIAGNOSIS — J96.01 ACUTE RESPIRATORY FAILURE WITH HYPOXIA: Primary | ICD-10-CM

## 2025-07-27 DIAGNOSIS — E78.2 MODERATE MIXED HYPERLIPIDEMIA NOT REQUIRING STATIN THERAPY: ICD-10-CM

## 2025-07-27 LAB
ALBUMIN SERPL-MCNC: 3.9 G/DL (ref 3.5–5.2)
ALBUMIN/GLOB SERPL: 1.2 G/DL
ALP SERPL-CCNC: 80 U/L (ref 39–117)
ALT SERPL W P-5'-P-CCNC: 6 U/L (ref 1–33)
ANION GAP SERPL CALCULATED.3IONS-SCNC: 12.2 MMOL/L (ref 5–15)
AORTIC DIMENSIONLESS INDEX: 0.87 (DI)
ARTERIAL PATENCY WRIST A: POSITIVE
ASCENDING AORTA: 3.7 CM
AST SERPL-CCNC: 17 U/L (ref 1–32)
ATMOSPHERIC PRESS: ABNORMAL MM[HG]
AV MEAN PRESS GRAD SYS DOP V1V2: 5 MMHG
AV VMAX SYS DOP: 158 CM/SEC
BASE EXCESS BLDA CALC-SCNC: -0.8 MMOL/L (ref 0–2)
BASOPHILS # BLD AUTO: 0.12 10*3/MM3 (ref 0–0.2)
BASOPHILS NFR BLD AUTO: 1.1 % (ref 0–1.5)
BDY SITE: ABNORMAL
BH CV ECHO MEAS - AO MAX PG: 10 MMHG
BH CV ECHO MEAS - AO ROOT DIAM: 3.2 CM
BH CV ECHO MEAS - AO V2 VTI: 24.2 CM
BH CV ECHO MEAS - AVA(I,D): 1.98 CM2
BH CV ECHO MEAS - EDV(CUBED): 74.1 ML
BH CV ECHO MEAS - EDV(MOD-SP2): 47.2 ML
BH CV ECHO MEAS - EDV(MOD-SP4): 49.1 ML
BH CV ECHO MEAS - EF(MOD-SP2): 79.1 %
BH CV ECHO MEAS - EF(MOD-SP4): 79.8 %
BH CV ECHO MEAS - ESV(CUBED): 15.6 ML
BH CV ECHO MEAS - ESV(MOD-SP2): 9.9 ML
BH CV ECHO MEAS - ESV(MOD-SP4): 9.9 ML
BH CV ECHO MEAS - FS: 40.5 %
BH CV ECHO MEAS - IVS/LVPW: 1 CM
BH CV ECHO MEAS - IVSD: 1 CM
BH CV ECHO MEAS - LA DIMENSION: 2.4 CM
BH CV ECHO MEAS - LAT PEAK E' VEL: 8.2 CM/SEC
BH CV ECHO MEAS - LV DIASTOLIC VOL/BSA (35-75): 30.6 CM2
BH CV ECHO MEAS - LV MASS(C)D: 137.2 GRAMS
BH CV ECHO MEAS - LV MAX PG: 8.3 MMHG
BH CV ECHO MEAS - LV MEAN PG: 5 MMHG
BH CV ECHO MEAS - LV SYSTOLIC VOL/BSA (12-30): 6.2 CM2
BH CV ECHO MEAS - LV V1 MAX: 144 CM/SEC
BH CV ECHO MEAS - LV V1 VTI: 21.1 CM
BH CV ECHO MEAS - LVIDD: 4.2 CM
BH CV ECHO MEAS - LVIDS: 2.5 CM
BH CV ECHO MEAS - LVOT AREA: 2.27 CM2
BH CV ECHO MEAS - LVOT DIAM: 1.7 CM
BH CV ECHO MEAS - LVPWD: 1 CM
BH CV ECHO MEAS - MED PEAK E' VEL: 7.2 CM/SEC
BH CV ECHO MEAS - MV A MAX VEL: 101 CM/SEC
BH CV ECHO MEAS - MV DEC SLOPE: 385 CM/SEC2
BH CV ECHO MEAS - MV DEC TIME: 0.16 SEC
BH CV ECHO MEAS - MV E MAX VEL: 61.8 CM/SEC
BH CV ECHO MEAS - MV E/A: 0.61
BH CV ECHO MEAS - MV MAX PG: 4.1 MMHG
BH CV ECHO MEAS - MV MEAN PG: 3 MMHG
BH CV ECHO MEAS - MV P1/2T: 60.9 MSEC
BH CV ECHO MEAS - MV V2 VTI: 15.1 CM
BH CV ECHO MEAS - MVA(P1/2T): 3.6 CM2
BH CV ECHO MEAS - MVA(VTI): 3.2 CM2
BH CV ECHO MEAS - RAP SYSTOLE: 3 MMHG
BH CV ECHO MEAS - SV(LVOT): 47.9 ML
BH CV ECHO MEAS - SV(MOD-SP2): 37.3 ML
BH CV ECHO MEAS - SV(MOD-SP4): 39.2 ML
BH CV ECHO MEAS - SVI(LVOT): 29.8 ML/M2
BH CV ECHO MEAS - SVI(MOD-SP2): 23.3 ML/M2
BH CV ECHO MEAS - SVI(MOD-SP4): 24.4 ML/M2
BH CV ECHO MEAS - TAPSE (>1.6): 1.35 CM
BH CV ECHO MEASUREMENTS AVERAGE E/E' RATIO: 8.03
BH CV XLRA - RV BASE: 2.4 CM
BH CV XLRA - RV LENGTH: 5.8 CM
BH CV XLRA - RV MID: 2.1 CM
BH CV XLRA - TDI S': 23.3 CM/SEC
BILIRUB SERPL-MCNC: 0.5 MG/DL (ref 0–1.2)
BILIRUB UR QL STRIP: NEGATIVE
BODY TEMPERATURE: 37
BUN SERPL-MCNC: 23 MG/DL (ref 8–23)
BUN/CREAT SERPL: 17.6 (ref 7–25)
CALCIUM SPEC-SCNC: 8.9 MG/DL (ref 8.6–10.5)
CHLORIDE SERPL-SCNC: 106 MMOL/L (ref 98–107)
CLARITY UR: CLEAR
CO2 BLDA-SCNC: 23.6 MMOL/L (ref 22–33)
CO2 SERPL-SCNC: 21.8 MMOL/L (ref 22–29)
COHGB MFR BLD: 1.3 % (ref 0–2)
COLOR UR: YELLOW
CREAT SERPL-MCNC: 1.31 MG/DL (ref 0.57–1)
DEPRECATED RDW RBC AUTO: 49.1 FL (ref 37–54)
EGFRCR SERPLBLD CKD-EPI 2021: 41.8 ML/MIN/1.73
EOSINOPHIL # BLD AUTO: 0.39 10*3/MM3 (ref 0–0.4)
EOSINOPHIL NFR BLD AUTO: 3.5 % (ref 0.3–6.2)
EPAP: 0
ERYTHROCYTE [DISTWIDTH] IN BLOOD BY AUTOMATED COUNT: 14.5 % (ref 12.3–15.4)
GEN 5 1HR TROPONIN T REFLEX: 11 NG/L
GLOBULIN UR ELPH-MCNC: 3.3 GM/DL
GLUCOSE SERPL-MCNC: 116 MG/DL (ref 65–99)
GLUCOSE UR STRIP-MCNC: ABNORMAL MG/DL
HCO3 BLDA-SCNC: 22.5 MMOL/L (ref 20–26)
HCT VFR BLD AUTO: 49.2 % (ref 34–46.6)
HCT VFR BLD CALC: 49.3 % (ref 38–51)
HGB BLD-MCNC: 15.9 G/DL (ref 12–15.9)
HGB BLDA-MCNC: 16.1 G/DL (ref 14–18)
HGB UR QL STRIP.AUTO: NEGATIVE
HOLD SPECIMEN: NORMAL
IMM GRANULOCYTES # BLD AUTO: 0.05 10*3/MM3 (ref 0–0.05)
IMM GRANULOCYTES NFR BLD AUTO: 0.5 % (ref 0–0.5)
INHALED O2 CONCENTRATION: 21 %
IPAP: 0
KETONES UR QL STRIP: NEGATIVE
LEFT ATRIUM VOLUME INDEX: 9.5 ML/M2
LEUKOCYTE ESTERASE UR QL STRIP.AUTO: NEGATIVE
LV EF BIPLANE MOD: 79.3 %
LYMPHOCYTES # BLD AUTO: 3.02 10*3/MM3 (ref 0.7–3.1)
LYMPHOCYTES NFR BLD AUTO: 27.3 % (ref 19.6–45.3)
MCH RBC QN AUTO: 30 PG (ref 26.6–33)
MCHC RBC AUTO-ENTMCNC: 32.3 G/DL (ref 31.5–35.7)
MCV RBC AUTO: 92.8 FL (ref 79–97)
METHGB BLD QL: 0.2 % (ref 0–1.5)
MODALITY: ABNORMAL
MONOCYTES # BLD AUTO: 1.3 10*3/MM3 (ref 0.1–0.9)
MONOCYTES NFR BLD AUTO: 11.8 % (ref 5–12)
NEUTROPHILS NFR BLD AUTO: 55.8 % (ref 42.7–76)
NEUTROPHILS NFR BLD AUTO: 6.18 10*3/MM3 (ref 1.7–7)
NITRITE UR QL STRIP: NEGATIVE
NRBC BLD AUTO-RTO: 0 /100 WBC (ref 0–0.2)
NT-PROBNP SERPL-MCNC: 432 PG/ML (ref 0–1800)
OXYHGB MFR BLDV: 93.7 % (ref 94–99)
PAW @ PEAK INSP FLOW SETTING VENT: 0 CMH2O
PCO2 BLDA: 33.2 MM HG (ref 35–45)
PCO2 TEMP ADJ BLD: 33.2 MM HG (ref 35–45)
PH BLDA: 7.44 PH UNITS (ref 7.35–7.45)
PH UR STRIP.AUTO: <=5 [PH] (ref 5–8)
PH, TEMP CORRECTED: 7.44 PH UNITS
PLATELET # BLD AUTO: 301 10*3/MM3 (ref 140–450)
PMV BLD AUTO: 10.9 FL (ref 6–12)
PO2 BLDA: 70.6 MM HG (ref 83–108)
PO2 TEMP ADJ BLD: 70.6 MM HG (ref 83–108)
POTASSIUM SERPL-SCNC: 3.9 MMOL/L (ref 3.5–5.2)
PROCALCITONIN SERPL-MCNC: 0.06 NG/ML (ref 0–0.25)
PROT SERPL-MCNC: 7.2 G/DL (ref 6–8.5)
PROT UR QL STRIP: NEGATIVE
QT INTERVAL: 360 MS
QT INTERVAL: 366 MS
QTC INTERVAL: 433 MS
QTC INTERVAL: 435 MS
RBC # BLD AUTO: 5.3 10*6/MM3 (ref 3.77–5.28)
SODIUM SERPL-SCNC: 140 MMOL/L (ref 136–145)
SP GR UR STRIP: 1.01 (ref 1–1.03)
TOTAL RATE: 0 BREATHS/MINUTE
TROPONIN T % DELTA: -21
TROPONIN T NUMERIC DELTA: -3 NG/L
TROPONIN T SERPL HS-MCNC: 14 NG/L
TSH SERPL DL<=0.05 MIU/L-ACNC: 1.05 UIU/ML (ref 0.27–4.2)
UROBILINOGEN UR QL STRIP: ABNORMAL
WBC NRBC COR # BLD AUTO: 11.06 10*3/MM3 (ref 3.4–10.8)
WHOLE BLOOD HOLD COAG: NORMAL
WHOLE BLOOD HOLD SPECIMEN: NORMAL

## 2025-07-27 PROCEDURE — 93010 ELECTROCARDIOGRAM REPORT: CPT | Performed by: INTERNAL MEDICINE

## 2025-07-27 PROCEDURE — 80053 COMPREHEN METABOLIC PANEL: CPT | Performed by: EMERGENCY MEDICINE

## 2025-07-27 PROCEDURE — 99291 CRITICAL CARE FIRST HOUR: CPT

## 2025-07-27 PROCEDURE — 94761 N-INVAS EAR/PLS OXIMETRY MLT: CPT

## 2025-07-27 PROCEDURE — G0378 HOSPITAL OBSERVATION PER HR: HCPCS

## 2025-07-27 PROCEDURE — 99222 1ST HOSP IP/OBS MODERATE 55: CPT | Performed by: INTERNAL MEDICINE

## 2025-07-27 PROCEDURE — 82375 ASSAY CARBOXYHB QUANT: CPT

## 2025-07-27 PROCEDURE — 83050 HGB METHEMOGLOBIN QUAN: CPT

## 2025-07-27 PROCEDURE — 71045 X-RAY EXAM CHEST 1 VIEW: CPT

## 2025-07-27 PROCEDURE — 94799 UNLISTED PULMONARY SVC/PX: CPT

## 2025-07-27 PROCEDURE — 85025 COMPLETE CBC W/AUTO DIFF WBC: CPT | Performed by: EMERGENCY MEDICINE

## 2025-07-27 PROCEDURE — 25010000002 FUROSEMIDE PER 20 MG: Performed by: INTERNAL MEDICINE

## 2025-07-27 PROCEDURE — 81003 URINALYSIS AUTO W/O SCOPE: CPT | Performed by: INTERNAL MEDICINE

## 2025-07-27 PROCEDURE — 25010000002 METHYLPREDNISOLONE PER 125 MG: Performed by: EMERGENCY MEDICINE

## 2025-07-27 PROCEDURE — 93005 ELECTROCARDIOGRAM TRACING: CPT

## 2025-07-27 PROCEDURE — 36600 WITHDRAWAL OF ARTERIAL BLOOD: CPT

## 2025-07-27 PROCEDURE — 94640 AIRWAY INHALATION TREATMENT: CPT

## 2025-07-27 PROCEDURE — 83880 ASSAY OF NATRIURETIC PEPTIDE: CPT | Performed by: EMERGENCY MEDICINE

## 2025-07-27 PROCEDURE — 84484 ASSAY OF TROPONIN QUANT: CPT | Performed by: EMERGENCY MEDICINE

## 2025-07-27 PROCEDURE — 82805 BLOOD GASES W/O2 SATURATION: CPT

## 2025-07-27 PROCEDURE — 93306 TTE W/DOPPLER COMPLETE: CPT | Performed by: INTERNAL MEDICINE

## 2025-07-27 PROCEDURE — 25010000002 SULFUR HEXAFLUORIDE MICROSPH 60.7-25 MG RECONSTITUTED SUSPENSION: Performed by: INTERNAL MEDICINE

## 2025-07-27 PROCEDURE — 93005 ELECTROCARDIOGRAM TRACING: CPT | Performed by: EMERGENCY MEDICINE

## 2025-07-27 PROCEDURE — 84145 PROCALCITONIN (PCT): CPT | Performed by: INTERNAL MEDICINE

## 2025-07-27 PROCEDURE — 93306 TTE W/DOPPLER COMPLETE: CPT

## 2025-07-27 PROCEDURE — 84443 ASSAY THYROID STIM HORMONE: CPT | Performed by: INTERNAL MEDICINE

## 2025-07-27 RX ORDER — IPRATROPIUM BROMIDE AND ALBUTEROL SULFATE 2.5; .5 MG/3ML; MG/3ML
3 SOLUTION RESPIRATORY (INHALATION) ONCE
Status: COMPLETED | OUTPATIENT
Start: 2025-07-27 | End: 2025-07-27

## 2025-07-27 RX ORDER — BUSPIRONE HYDROCHLORIDE 10 MG/1
10 TABLET ORAL EVERY 8 HOURS SCHEDULED
Status: DISCONTINUED | OUTPATIENT
Start: 2025-07-27 | End: 2025-07-31 | Stop reason: HOSPADM

## 2025-07-27 RX ORDER — BISACODYL 5 MG/1
5 TABLET, DELAYED RELEASE ORAL DAILY PRN
Status: DISCONTINUED | OUTPATIENT
Start: 2025-07-27 | End: 2025-07-31 | Stop reason: HOSPADM

## 2025-07-27 RX ORDER — SODIUM CHLORIDE 9 MG/ML
40 INJECTION, SOLUTION INTRAVENOUS AS NEEDED
Status: DISCONTINUED | OUTPATIENT
Start: 2025-07-27 | End: 2025-07-31 | Stop reason: HOSPADM

## 2025-07-27 RX ORDER — FUROSEMIDE 10 MG/ML
60 INJECTION INTRAMUSCULAR; INTRAVENOUS ONCE
Status: COMPLETED | OUTPATIENT
Start: 2025-07-27 | End: 2025-07-27

## 2025-07-27 RX ORDER — FLUTICASONE PROPIONATE 50 MCG
2 SPRAY, SUSPENSION (ML) NASAL DAILY
Status: DISCONTINUED | OUTPATIENT
Start: 2025-07-27 | End: 2025-07-31 | Stop reason: HOSPADM

## 2025-07-27 RX ORDER — AMOXICILLIN 250 MG
2 CAPSULE ORAL 2 TIMES DAILY PRN
Status: DISCONTINUED | OUTPATIENT
Start: 2025-07-27 | End: 2025-07-31 | Stop reason: HOSPADM

## 2025-07-27 RX ORDER — SODIUM CHLORIDE 0.9 % (FLUSH) 0.9 %
10 SYRINGE (ML) INJECTION AS NEEDED
Status: DISCONTINUED | OUTPATIENT
Start: 2025-07-27 | End: 2025-07-31 | Stop reason: HOSPADM

## 2025-07-27 RX ORDER — ACETAMINOPHEN 160 MG/5ML
650 SOLUTION ORAL EVERY 4 HOURS PRN
Status: DISCONTINUED | OUTPATIENT
Start: 2025-07-27 | End: 2025-07-31 | Stop reason: HOSPADM

## 2025-07-27 RX ORDER — ACETAMINOPHEN 650 MG/1
650 SUPPOSITORY RECTAL EVERY 4 HOURS PRN
Status: DISCONTINUED | OUTPATIENT
Start: 2025-07-27 | End: 2025-07-31 | Stop reason: HOSPADM

## 2025-07-27 RX ORDER — DULOXETIN HYDROCHLORIDE 60 MG/1
60 CAPSULE, DELAYED RELEASE ORAL DAILY
Status: DISCONTINUED | OUTPATIENT
Start: 2025-07-27 | End: 2025-07-31 | Stop reason: HOSPADM

## 2025-07-27 RX ORDER — SODIUM CHLORIDE 0.9 % (FLUSH) 0.9 %
10 SYRINGE (ML) INJECTION EVERY 12 HOURS SCHEDULED
Status: DISCONTINUED | OUTPATIENT
Start: 2025-07-27 | End: 2025-07-31 | Stop reason: HOSPADM

## 2025-07-27 RX ORDER — IPRATROPIUM BROMIDE AND ALBUTEROL SULFATE 2.5; .5 MG/3ML; MG/3ML
3 SOLUTION RESPIRATORY (INHALATION) EVERY 4 HOURS PRN
Status: DISCONTINUED | OUTPATIENT
Start: 2025-07-27 | End: 2025-07-28

## 2025-07-27 RX ORDER — BISACODYL 10 MG
10 SUPPOSITORY, RECTAL RECTAL DAILY PRN
Status: DISCONTINUED | OUTPATIENT
Start: 2025-07-27 | End: 2025-07-31 | Stop reason: HOSPADM

## 2025-07-27 RX ORDER — ONDANSETRON 2 MG/ML
4 INJECTION INTRAMUSCULAR; INTRAVENOUS EVERY 6 HOURS PRN
Status: DISCONTINUED | OUTPATIENT
Start: 2025-07-27 | End: 2025-07-31 | Stop reason: HOSPADM

## 2025-07-27 RX ORDER — CITALOPRAM HYDROBROMIDE 20 MG/1
20 TABLET ORAL DAILY
Status: DISCONTINUED | OUTPATIENT
Start: 2025-07-27 | End: 2025-07-31 | Stop reason: HOSPADM

## 2025-07-27 RX ORDER — ACETAMINOPHEN 325 MG/1
650 TABLET ORAL EVERY 4 HOURS PRN
Status: DISCONTINUED | OUTPATIENT
Start: 2025-07-27 | End: 2025-07-31 | Stop reason: HOSPADM

## 2025-07-27 RX ORDER — POLYETHYLENE GLYCOL 3350 17 G/17G
17 POWDER, FOR SOLUTION ORAL DAILY PRN
Status: DISCONTINUED | OUTPATIENT
Start: 2025-07-27 | End: 2025-07-31 | Stop reason: HOSPADM

## 2025-07-27 RX ORDER — ATENOLOL 25 MG/1
25 TABLET ORAL DAILY
Status: DISCONTINUED | OUTPATIENT
Start: 2025-07-27 | End: 2025-07-31 | Stop reason: HOSPADM

## 2025-07-27 RX ORDER — METHYLPREDNISOLONE SODIUM SUCCINATE 125 MG/2ML
125 INJECTION, POWDER, LYOPHILIZED, FOR SOLUTION INTRAMUSCULAR; INTRAVENOUS ONCE
Status: COMPLETED | OUTPATIENT
Start: 2025-07-27 | End: 2025-07-27

## 2025-07-27 RX ADMIN — FUROSEMIDE 60 MG: 10 INJECTION, SOLUTION INTRAVENOUS at 09:32

## 2025-07-27 RX ADMIN — IPRATROPIUM BROMIDE AND ALBUTEROL SULFATE 3 ML: .5; 3 SOLUTION RESPIRATORY (INHALATION) at 06:46

## 2025-07-27 RX ADMIN — Medication 5 MG: at 20:35

## 2025-07-27 RX ADMIN — Medication 10 ML: at 20:35

## 2025-07-27 RX ADMIN — IPRATROPIUM BROMIDE AND ALBUTEROL SULFATE 3 ML: .5; 3 SOLUTION RESPIRATORY (INHALATION) at 16:48

## 2025-07-27 RX ADMIN — DULOXETINE 60 MG: 60 CAPSULE, DELAYED RELEASE ORAL at 09:32

## 2025-07-27 RX ADMIN — FLUTICASONE PROPIONATE 2 SPRAY: 50 SPRAY, METERED NASAL at 09:32

## 2025-07-27 RX ADMIN — METHYLPREDNISOLONE SODIUM SUCCINATE 125 MG: 125 INJECTION, POWDER, FOR SOLUTION INTRAMUSCULAR; INTRAVENOUS at 04:40

## 2025-07-27 RX ADMIN — ATENOLOL 25 MG: 25 TABLET ORAL at 09:32

## 2025-07-27 RX ADMIN — BUSPIRONE HYDROCHLORIDE 10 MG: 10 TABLET ORAL at 09:32

## 2025-07-27 RX ADMIN — CITALOPRAM HYDROBROMIDE 20 MG: 20 TABLET ORAL at 09:32

## 2025-07-27 RX ADMIN — SULFUR HEXAFLUORIDE 2 ML: KIT at 11:46

## 2025-07-27 RX ADMIN — Medication 10 ML: at 09:32

## 2025-07-27 RX ADMIN — BUSPIRONE HYDROCHLORIDE 10 MG: 10 TABLET ORAL at 20:34

## 2025-07-27 RX ADMIN — IPRATROPIUM BROMIDE AND ALBUTEROL SULFATE 3 ML: .5; 3 SOLUTION RESPIRATORY (INHALATION) at 04:40

## 2025-07-27 NOTE — ED PROVIDER NOTES
Subjective   History of Present Illness  80-year-old female with a history of COPD presenting to the emergency department with cough and shortness of breath.  Patient has had some shortness of breath for the last week, however it has been intensifying over the last 24 hours.  She was unable to catch her breath this morning which prompted her to come to the emergency department.  Patient has had a headache mild nonproductive cough.  She has had some wheezing.  She does not normally wear oxygen at home.  Denies any fevers or chills.  No headache or change in vision.  No focal weakness.  No abdominal pain or vomiting.  No chest pain    History provided by:  Patient   used: No        Review of Systems   Constitutional:  Negative for chills and fever.   HENT:  Negative for congestion, ear pain and sore throat.    Eyes:  Negative for visual disturbance.   Respiratory:  Positive for cough, shortness of breath and wheezing.    Cardiovascular:  Negative for chest pain.   Gastrointestinal:  Negative for abdominal pain.   Genitourinary:  Negative for difficulty urinating.   Musculoskeletal:  Negative for arthralgias.   Skin:  Negative for rash.   Neurological:  Negative for dizziness, weakness and numbness.   Psychiatric/Behavioral:  Negative for agitation.        Past Medical History:   Diagnosis Date    High cholesterol     History of pelvic fracture 1/8/2018    Hyperlipidemia     Hypertension     Osteoarthritis     Osteoporosis        No Known Allergies    Past Surgical History:   Procedure Laterality Date    COLON SURGERY      HYSTERECTOMY      SHOULDER SURGERY      rotator cuff (left)    TONSILLECTOMY         Family History   Problem Relation Age of Onset    Lymphoma Mother     Melanoma Mother     Cancer Mother     Osteoarthritis Mother     Hypertension Mother     Stroke Father     Heart disease Father     Heart failure Father     Hypertension Father     Heart attack Father     Breast cancer Neg Hx      Ovarian cancer Neg Hx        Social History     Socioeconomic History    Marital status:    Tobacco Use    Smoking status: Never    Smokeless tobacco: Never   Vaping Use    Vaping status: Never Used   Substance and Sexual Activity    Alcohol use: No    Drug use: No    Sexual activity: Defer     Partners: Male           Objective   Physical Exam  Vitals and nursing note reviewed.   Constitutional:       General: She is not in acute distress.     Appearance: She is not ill-appearing or toxic-appearing.   Eyes:      Conjunctiva/sclera: Conjunctivae normal.   Cardiovascular:      Rate and Rhythm: Normal rate and regular rhythm.   Pulmonary:      Effort: Tachypnea and respiratory distress present.      Breath sounds: Wheezing present.   Abdominal:      General: Abdomen is flat. There is no distension.      Palpations: There is no mass.      Tenderness: There is no abdominal tenderness. There is no guarding or rebound.   Musculoskeletal:         General: No deformity. Normal range of motion.   Skin:     General: Skin is warm.      Findings: No rash.   Neurological:      General: No focal deficit present.      Mental Status: She is alert and oriented to person, place, and time.      Motor: No weakness.         ECG 12 Lead Dyspnea      Date/Time: 7/27/2025 4:56 AM    Performed by: Clint Contreras MD  Authorized by: Clint Contreras MD  Interpreted by ED physician  Comparison: compared with previous ECG   Similar to previous ECG  Rhythm: sinus rhythm  Rate: normal  BPM: 87  QRS axis: normal  Conduction: conduction normal  ST Segments: ST segments normal  Other: no other findings  Clinical impression: normal ECG  Comments: Interpretation:  EKG was directly visualized by myself, interpretations as documented in hospital course.               ED Course  ED Course as of 07/27/25 0606   Sun Jul 27, 2025   0458 BP(!): 169/105 [JK]   0458 Temp: 98.2 °F (36.8 °C) [JK]   0458 Heart Rate: 96 [JK]   0458 Resp: 18 [JK]    0458 SpO2(!): 89 %  Interpretation:  Patient's repeat vitals, telemetry tracing, and pulse oximetry tracing were directly viewed and interpreted by myself.   O2 sat 89% on room air, interpreted as hypoxia.  Telemetry rhythm strip revealed a rate of 96 bpm, interpreted as normal sinus rhythm [JK]   0458 Blood Gas, Arterial With Co-Ox(!)  Interpretation:  Patient's Blood Gas was directly viewed and interpreted by myself.   Hypoxia [JK]   0547 High Sensitivity Troponin T(!) [JK]   0547 Comprehensive Metabolic Panel(!) [JK]   0547 CBC & Differential(!)  Interpretation:  Laboratory studies were reviewed and interpreted directly by myself.  CMP shows some minor elevation creatinine 1.31, troponin was emotional 14, CBC showed some minor leukocytosis with white blood cell count of 11 [JK]   0603 XR Chest 1 View  Interpretation:  Imaging was directly visualized by myself, per my interpretations, chest x-ray showed some small bilateral pleural effusions. [JK]   0604 Patient continues to have some mild hypoxia despite additional DuoNeb therapy.  Findings consistent with respiratory failure secondary to COPD exacerbation.  Patient be admitted to hospital for further evaluation and treatment [JK]   0605 Based on the patient's presentation, history and diffuse work-up in the emergency department, the patient is deemed appropriate for admission to the hospital for further evaluation and treatment.  This was discussed with the patient at bedside.  They are in agreement with the current medical management.    Admitting physician: Dr. Laughlin    Discussion was had with admitting physician regarding the laboratory and imaging findings.  We did discuss current therapeutics in the emergency department and progression of the patient.  Working diagnosis was conveyed to the admitting physician, as well as current status and prognosis for the patient.  They are in agreement with these findings and have accepted admission.    Shared  decision making:   After full review of the patient's clinical presentation, review of any work-up including but not limited to laboratory studies and radiology obtained, I had a discussion with the patient.  Treatment options were discussed as well as the risks, benefits and consequences.  I discussed all findings with the patient and family members if available.  During the discussion, treatment goals were understood by all as well as any misconceptions which were addressed with the patient.  Ample time was given for any questions they may have had.  They are in agreement with the treatment plan as well as final disposition. [JK]      ED Course User Index  [JK] Clint Contreras MD                                                       Medical Decision Making  This is a a 80-year-old female presenting with some shortness of breath.  The patient is hypoxic on initial presentation and appears to be in moderate respiratory distress.  She does have diffuse wheezing.  Symptoms seem consistent with bronchitis possibly pneumonia with respiratory failure.  Patient's overall presentation is deemed critical.  Given the initial presentation and past medical history, the patient has a high risk of serious morbidity and mortality.  Patient was immediately evaluated in the Emergency Department.  IV access was established in the patient.  Placed on continuous telemetry monitoring.  Supplied supplemental oxygen.  Patient ministered DuoNeb therapy as well as supplemental steroids.  Differential is broad and will require extensive treatment and evaluation.  Workup initiated.      Differential diagnosis: COPD exacerbation, acute respiratory failure, bronchitis, pneumonia, CHF, CAD, dysrhythmia, acute kidney injury, electrolyte abnormality, anemia      Amount and/or Complexity of Data Reviewed  Independent Historian: caregiver  External Data Reviewed: labs, radiology, ECG and notes.     Details: External laboratories, imaging as  well as notes were reviewed personally by myself.  All relevant studies were used to guide decision making.     Date of previous record: 4/25/2025    Source of note: PCP    Summary:  Patient was seen and evaluated for routine visit.  I did review basic laboratory studies on file as well as a previous chest x-ray and EKG.  Records reviewed    Labs: ordered. Decision-making details documented in ED Course.  Radiology: ordered and independent interpretation performed. Decision-making details documented in ED Course.  ECG/medicine tests: ordered and independent interpretation performed. Decision-making details documented in ED Course.    Risk  Prescription drug management.    Critical Care  Total time providing critical care: 35 minutes (Authorized and performed by: Clint Contreras MD  I personally spent a total of 35 minutes of critical care time with the patient.  Due to the high probability of clinically significant, life-threatening deterioration, the patient required my highest level of care to intervene emergently.  These interventions, including, but not limited to, establishing IV access, continuous pulse oximeter and telemetry monitoring, frequent monitoring and reevaluations, management the patient's airway and cardiovascular system, discussion with other consultants as needed, which bear directly on the management the patient.  This also includes obtaining history, examining the patient, frequent reevaluations and coordinating high level of care.  Failure to emergently initiate these interventions would carry a high probability of resulting in sudden, clinically significant or life threatening deterioration in the patient's condition.  This does not include time spent on separately reported billable procedures.)        Final diagnoses:   Acute respiratory failure with hypoxia   COPD with acute exacerbation   Moderate mixed hyperlipidemia not requiring statin therapy   Primary hypertension       ED  Disposition  ED Disposition       ED Disposition   Decision to Admit    Condition   --    Comment   --               No follow-up provider specified.       Medication List      No changes were made to your prescriptions during this visit.            Clint Contreras MD  07/27/25 0606

## 2025-07-27 NOTE — Clinical Note
Level of Care: Telemetry [5]   Diagnosis: Acute exacerbation of chronic obstructive pulmonary disease (COPD) [447312]   Admitting Physician: LATANYA HAZEL III [[id number]]   Attending Physician: LATANYA HAZEL III [[id number]]   Bed Request Comments: tele obs

## 2025-07-27 NOTE — ED NOTES
Stefanie Russell    Nursing Report ED to Floor:  Mental status: a&ox4  Ambulatory status: non ambulatory in ed/ rolator  Oxygen Therapy:  3l nc, RA baseline  Cardiac Rhythm: nsr  Admitted from: home  Safety Concerns:  na  Precautions: na  Social Issues: na  ED Room #:  20    ED Nurse Phone Extension - [phone] or may call [phone].      HPI:   Chief Complaint   Patient presents with    Shortness of Breath       Past Medical History:  Past Medical History:   Diagnosis Date    High cholesterol     History of pelvic fracture 1/8/2018    Hyperlipidemia     Hypertension     Osteoarthritis     Osteoporosis         Past Surgical History:  Past Surgical History:   Procedure Laterality Date    COLON SURGERY      HYSTERECTOMY      SHOULDER SURGERY      rotator cuff (left)    TONSILLECTOMY          Admitting Doctor:   Charlie Laughlin III, DO    Consulting Provider(s):  Consults       No orders found from 6/28/2025 to 7/28/2025.             Admitting Diagnosis:   The primary encounter diagnosis was Acute respiratory failure with hypoxia. Diagnoses of COPD with acute exacerbation, Moderate mixed hyperlipidemia not requiring statin therapy, and Primary hypertension were also pertinent to this visit.    Most Recent Vitals:   Vitals:    07/27/25 0500 07/27/25 0530 07/27/25 0600 07/27/25 0630   BP: 152/84 158/85 142/82 147/84   Pulse: 99 84 89 92   Resp:       Temp:       SpO2: 90% 91% (!) 89% 90%   Weight:       Height:           Active LDAs/IV Access:   Lines, Drains & Airways       Active LDAs       Name Placement date Placement time Site Days    PICC Double Lumen 04/23/18 Right Basilic 04/23/18  1118  Basilic  2651    Peripheral IV 07/27/25 0439 20 G Right Antecubital 07/27/25  0439  Antecubital  less than 1    Peripheral IV 07/27/25 0440 20 G Right Antecubital 07/27/25  0440  Antecubital  less than 1                    Labs (abnormal labs have a star):   Labs Reviewed   COMPREHENSIVE METABOLIC PANEL - Abnormal; Notable for the  following components:       Result Value    Glucose 116 (*)     Creatinine 1.31 (*)     CO2 21.8 (*)     eGFR 41.8 (*)     All other components within normal limits    Narrative:     GFR Categories in Chronic Kidney Disease (CKD)              GFR Category          GFR (mL/min/1.73)    Interpretation  G1                    90 or greater        Normal or high (1)  G2                    60-89                Mild decrease (1)  G3a                   45-59                Mild to moderate decrease  G3b                   30-44                Moderate to severe decrease  G4                    15-29                Severe decrease  G5                    14 or less           Kidney failure    (1)In the absence of evidence of kidney disease, neither GFR category G1 or G2 fulfill the criteria for CKD.    eGFR calculation 2021 CKD-EPI creatinine equation, which does not include race as a factor   TROPONIN - Abnormal; Notable for the following components:    HS Troponin T 14 (*)     All other components within normal limits    Narrative:     High Sensitive Troponin T Reference Range:  <14.0 ng/L- Negative Female for AMI  <22.0 ng/L- Negative Male for AMI  >=14 - Abnormal Female indicating possible myocardial injury.  >=22 - Abnormal Male indicating possible myocardial injury.   Clinicians would have to utilize clinical acumen, EKG, Troponin, and serial changes to determine if it is an Acute Myocardial Infarction or myocardial injury due to an underlying chronic condition.        CBC WITH AUTO DIFFERENTIAL - Abnormal; Notable for the following components:    WBC 11.06 (*)     RBC 5.30 (*)     Hematocrit 49.2 (*)     Monocytes, Absolute 1.30 (*)     All other components within normal limits   BLOOD GAS, ARTERIAL W/CO-OXIMETRY - Abnormal; Notable for the following components:    pCO2, Arterial 33.2 (*)     pO2, Arterial 70.6 (*)     Base Excess, Arterial -0.8 (*)     Oxyhemoglobin 93.7 (*)     pCO2, Temperature Corrected 33.2 (*)      pO2, Temperature Corrected 70.6 (*)     All other components within normal limits   BNP (IN-HOUSE) - Normal    Narrative:     This assay is used as an aid in the diagnosis of individuals suspected of having heart failure. It can be used as an aid in the diagnosis of acute decompensated heart failure (ADHF) in patients presenting with signs and symptoms of ADHF to the emergency department (ED). In addition, NT-proBNP of <300 pg/mL indicates ADHF is not likely.    Age Range Result Interpretation  NT-proBNP Concentration (pg/mL:      <50             Positive            >450                   Gray                 300-450                    Negative             <300    50-75           Positive            >900                  Gray                300-900                  Negative            <300      >75             Positive            >1800                  Gray                300-1800                  Negative            <300   RAINBOW DRAW    Narrative:     The following orders were created for panel order [place] Draw.  Procedure                               Abnormality         Status                     ---------                               -----------         ------                     Green Top (Gel)[[id number]]                                  Final result               Lavender Top[[id number]]                                     Final result               Gold Top - SST[[id number]]                                   Final result               Haque Top[[id number]]                                         Final result               Light Blue Top[[id number]]                                   Final result                 Please view results for these tests on the individual orders.   BLOOD GAS, ARTERIAL   HIGH SENSITIVITIY TROPONIN T 1HR    Narrative:     High Sensitive Troponin T Reference Range:  <14.0 ng/L- Negative Female for AMI  <22.0 ng/L- Negative Male for AMI  >=14 - Abnormal Female indicating possible  myocardial injury.  >=22 - Abnormal Male indicating possible myocardial injury.   Clinicians would have to utilize clinical acumen, EKG, Troponin, and serial changes to determine if it is an Acute Myocardial Infarction or myocardial injury due to an underlying chronic condition.        PROCALCITONIN   TSH RFX ON ABNORMAL TO FREE T4   URINALYSIS W/ MICROSCOPIC IF INDICATED (NO CULTURE)   CBC AND DIFFERENTIAL    Narrative:     The following orders were created for panel order CBC & Differential.  Procedure                               Abnormality         Status                     ---------                               -----------         ------                     CBC Auto Differential[[id number]]        Abnormal            Final result                 Please view results for these tests on the individual orders.   GREEN TOP   LAVENDER TOP   GOLD TOP - SST   GRAY TOP   LIGHT BLUE TOP       Meds Given in ED:   Medications   sodium chloride 0.9 % flush 10 mL (has no administration in time range)   sodium chloride 0.9 % flush 10 mL (has no administration in time range)   sodium chloride 0.9 % flush 10 mL (has no administration in time range)   sodium chloride 0.9 % infusion 40 mL (has no administration in time range)   Potassium Replacement - Follow Nurse / BPA Driven Protocol (has no administration in time range)   Magnesium Standard Dose Replacement - Follow Nurse / BPA Driven Protocol (has no administration in time range)   Phosphorus Replacement - Follow Nurse / BPA Driven Protocol (has no administration in time range)   Calcium Replacement - Follow Nurse / BPA Driven Protocol (has no administration in time range)   acetaminophen (TYLENOL) tablet 650 mg (has no administration in time range)     Or   acetaminophen (TYLENOL) 160 MG/5ML oral solution 650 mg (has no administration in time range)     Or   acetaminophen (TYLENOL) suppository 650 mg (has no administration in time range)   sennosides-docusate (PERICOLACE)  8.6-50 MG per tablet 2 tablet (has no administration in time range)     And   polyethylene glycol (MIRALAX) packet 17 g (has no administration in time range)     And   bisacodyl (DULCOLAX) EC tablet 5 mg (has no administration in time range)     And   bisacodyl (DULCOLAX) suppository 10 mg (has no administration in time range)   ondansetron (ZOFRAN) injection 4 mg (has no administration in time range)   ipratropium-albuterol (DUO-NEB) nebulizer solution 3 mL (3 mL Nebulization Given 7/27/25 0440)   methylPREDNISolone sodium succinate (SOLU-Medrol) injection 125 mg (125 mg Intravenous Given 7/27/25 0440)   ipratropium-albuterol (DUO-NEB) nebulizer solution 3 mL (3 mL Nebulization Given 7/27/25 0646)           Last NIH score:                                                          Dysphagia screening results:        Romi Coma Scale:  No data recorded     CIWA:        Restraint Type:            Isolation Status:  No active isolations

## 2025-07-27 NOTE — H&P
[facility] Medicine Services  HISTORY AND PHYSICAL    Patient Name: Stefanie Russell  : 1945  MRN: [id number]  Primary Care Physician: Eric Brunson DO  Date of admission: 2025      Subjective   Subjective     Chief Complaint:  Shortness of breath    HPI:  Stefanie Russell is a 80 y.o. female with history of hypertension, mood disorder who developed worsening shortness of breath over the last 48 hours.  She states she having trouble catching her breath with normal activities.  She also notes some fullness in her abdomen but not sure if she is actually gained any weight.  She does not take a diuretic at baseline.  She recently had a significant stressful event about 3 months ago with the death of a great nephew.      Personal History     Past Medical History:   Diagnosis Date    High cholesterol     History of pelvic fracture 2018    Hyperlipidemia     Hypertension     Osteoarthritis     Osteoporosis            Past Surgical History:   Procedure Laterality Date    COLON SURGERY      HYSTERECTOMY      SHOULDER SURGERY      rotator cuff (left)    TONSILLECTOMY         Family History: family history includes Cancer in her mother; Heart attack in her father; Heart disease in her father; Heart failure in her father; Hypertension in her father and mother; Lymphoma in her mother; Melanoma in her mother; Osteoarthritis in her mother; Stroke in her father.     Social History:  reports that she has never smoked. She has never used smokeless tobacco. She reports that she does not drink alcohol and does not use drugs.  Social History     Social History Narrative    Not on file       Medications:  Available home medication information reviewed.  DULoxetine, QUEtiapine, alendronate, atenolol, busPIRone, calcium carb-cholecalciferol, cephalexin, citalopram, diclofenac, fluticasone, lisinopril, lovastatin, silver sulfadiazine, and venlafaxine XR    No Known Allergies    Objective    Objective     Vital Signs:   Temp:  [98.2 °F (36.8 °C)] 98.2 °F (36.8 °C)  Heart Rate:  [] 107  Resp:  [18] 18  BP: (141-169)/() 152/77  Flow (L/min) (Oxygen Therapy):  [2] 2       Physical Exam  Constitutional:       General: She is not in acute distress.     Appearance: Normal appearance.   HENT:      Mouth/Throat:      Mouth: Mucous membranes are dry.   Cardiovascular:      Rate and Rhythm: Normal rate and regular rhythm.   Pulmonary:      Effort: No respiratory distress.      Comments: Conversational dyspnea  Abdominal:      General: There is distension.      Palpations: Abdomen is soft.      Tenderness: There is no abdominal tenderness.   Musculoskeletal:      Comments: Trace LE edema   Skin:     General: Skin is warm and dry.   Neurological:      Mental Status: She is alert and oriented to person, place, and time.            Result Review:  I have personally reviewed the results from the time of this admission to 7/27/2025 07:59 EDT and agree with these findings:  [x]  Laboratory list / accordion  []  Microbiology  [x]  Radiology  []  EKG/Telemetry   []  Cardiology/Vascular   []  Pathology  [x]  Old records  []  Other:  Most notable findings include:       LAB RESULTS:      Lab 07/27/25  0545 07/27/25  0433   WBC  --  11.06*   HEMOGLOBIN  --  15.9   HEMATOCRIT  --  49.2*   PLATELETS  --  301   NEUTROS ABS  --  6.18   IMMATURE GRANS (ABS)  --  0.05   LYMPHS ABS  --  3.02   MONOS ABS  --  1.30*   EOS ABS  --  0.39   MCV  --  92.8   PROCALCITONIN 0.06  --          Lab 07/27/25  0545 07/27/25  0433   SODIUM  --  140   POTASSIUM  --  3.9   CHLORIDE  --  106   CO2  --  21.8*   ANION GAP  --  12.2   BUN  --  23.0   CREATININE  --  1.31*   EGFR  --  41.8*   GLUCOSE  --  116*   CALCIUM  --  8.9   TSH 1.050  --          Lab 07/27/25  0433   TOTAL PROTEIN 7.2   ALBUMIN 3.9   GLOBULIN 3.3   ALT (SGPT) 6   AST (SGOT) 17   BILIRUBIN 0.5   ALK PHOS 80         Lab 07/27/25  0545 07/27/25  0433   PROBNP  --   432.0   HSTROP T 11 14*                 Lab 07/27/25  0449   PH, ARTERIAL 7.440   PCO2, ARTERIAL 33.2*   PO2 ART 70.6*   FIO2 21   HCO3 ART 22.5   BASE EXCESS ART -0.8*   CARBOXYHEMOGLOBIN 1.3         Microbiology Results (last 10 days)       ** No results found for the last 240 hours. **            XR Chest 1 View  Result Date: 7/27/2025  XR CHEST 1 VW Date of Exam: 7/27/2025 4:47 AM EDT Indication: SOA triage protocol Comparison: 3/25/2022. Findings: There are no airspace consolidations. Small bilateral pleural effusions are present.. No pneumothorax. The pulmonary vasculature appears within normal limits. The cardiac and mediastinal silhouette appear unremarkable. No acute osseous abnormality identified.     Impression: Impression: Small bilateral pleural effusions. Electronically Signed: Tracey Reddy MD  7/27/2025 5:50 AM EDT  Workstation ID: [id number]          Assessment & Plan   Assessment & Plan       Acute exacerbation of chronic obstructive pulmonary disease (COPD)        80-year-old lady with history of hypertension admitted for worsening shortness of breath, suspected volume overload    Dyspnea  - Likely secondary to volume overload  - Lasix 60 mg IV x 1  - Check echo  - Daily weights  - Replace electrolytes per protocol  - Check TSH, T4    Hypertension  - Hold lisinopril, unsure what her baseline renal function is  - Continue atenolol with hold parameters    ?  PABLO versus CKD  - Monitor renal function for now  - Check UA    Mood disorder  - Continue home meds        VTE Prophylaxis:  Mechanical VTE prophylaxis orders are present.          CODE STATUS:    Code Status and Medical Interventions: CPR (Attempt to Resuscitate); Full Support   Ordered at: 07/27/25 0649     Code Status (Patient has no pulse and is not breathing):    CPR (Attempt to Resuscitate)     Medical Interventions (Patient has pulse or is breathing):    Full Support       Expected Discharge   Expected discharge date/ time has not been  documented.     Sayra Churchill,   07/27/25

## 2025-07-28 LAB
ANION GAP SERPL CALCULATED.3IONS-SCNC: 14.9 MMOL/L (ref 5–15)
BASOPHILS # BLD AUTO: 0.03 10*3/MM3 (ref 0–0.2)
BASOPHILS NFR BLD AUTO: 0.2 % (ref 0–1.5)
BUN SERPL-MCNC: 26.8 MG/DL (ref 8–23)
BUN/CREAT SERPL: 24.6 (ref 7–25)
CALCIUM SPEC-SCNC: 8.3 MG/DL (ref 8.6–10.5)
CHLORIDE SERPL-SCNC: 102 MMOL/L (ref 98–107)
CO2 SERPL-SCNC: 20.1 MMOL/L (ref 22–29)
CREAT SERPL-MCNC: 1.09 MG/DL (ref 0.57–1)
DEPRECATED RDW RBC AUTO: 49.1 FL (ref 37–54)
EGFRCR SERPLBLD CKD-EPI 2021: 52.1 ML/MIN/1.73
EOSINOPHIL # BLD AUTO: 0.01 10*3/MM3 (ref 0–0.4)
EOSINOPHIL NFR BLD AUTO: 0.1 % (ref 0.3–6.2)
ERYTHROCYTE [DISTWIDTH] IN BLOOD BY AUTOMATED COUNT: 14.6 % (ref 12.3–15.4)
GLUCOSE SERPL-MCNC: 162 MG/DL (ref 65–99)
HCT VFR BLD AUTO: 42.8 % (ref 34–46.6)
HGB BLD-MCNC: 14.3 G/DL (ref 12–15.9)
IMM GRANULOCYTES # BLD AUTO: 0.1 10*3/MM3 (ref 0–0.05)
IMM GRANULOCYTES NFR BLD AUTO: 0.7 % (ref 0–0.5)
LYMPHOCYTES # BLD AUTO: 0.97 10*3/MM3 (ref 0.7–3.1)
LYMPHOCYTES NFR BLD AUTO: 6.5 % (ref 19.6–45.3)
MAGNESIUM SERPL-MCNC: 2.2 MG/DL (ref 1.6–2.4)
MCH RBC QN AUTO: 30.7 PG (ref 26.6–33)
MCHC RBC AUTO-ENTMCNC: 33.4 G/DL (ref 31.5–35.7)
MCV RBC AUTO: 91.8 FL (ref 79–97)
MONOCYTES # BLD AUTO: 1.3 10*3/MM3 (ref 0.1–0.9)
MONOCYTES NFR BLD AUTO: 8.6 % (ref 5–12)
NEUTROPHILS NFR BLD AUTO: 12.62 10*3/MM3 (ref 1.7–7)
NEUTROPHILS NFR BLD AUTO: 83.9 % (ref 42.7–76)
NRBC BLD AUTO-RTO: 0 /100 WBC (ref 0–0.2)
PHOSPHATE SERPL-MCNC: 3.6 MG/DL (ref 2.5–4.5)
PLATELET # BLD AUTO: 251 10*3/MM3 (ref 140–450)
PMV BLD AUTO: 11.4 FL (ref 6–12)
POTASSIUM SERPL-SCNC: 4.3 MMOL/L (ref 3.5–5.2)
RBC # BLD AUTO: 4.66 10*6/MM3 (ref 3.77–5.28)
SODIUM SERPL-SCNC: 137 MMOL/L (ref 136–145)
WBC NRBC COR # BLD AUTO: 15.03 10*3/MM3 (ref 3.4–10.8)

## 2025-07-28 PROCEDURE — 94799 UNLISTED PULMONARY SVC/PX: CPT

## 2025-07-28 PROCEDURE — 80048 BASIC METABOLIC PNL TOTAL CA: CPT | Performed by: INTERNAL MEDICINE

## 2025-07-28 PROCEDURE — G0378 HOSPITAL OBSERVATION PER HR: HCPCS

## 2025-07-28 PROCEDURE — 94761 N-INVAS EAR/PLS OXIMETRY MLT: CPT

## 2025-07-28 PROCEDURE — 84100 ASSAY OF PHOSPHORUS: CPT | Performed by: INTERNAL MEDICINE

## 2025-07-28 PROCEDURE — 94664 DEMO&/EVAL PT USE INHALER: CPT

## 2025-07-28 PROCEDURE — 97162 PT EVAL MOD COMPLEX 30 MIN: CPT

## 2025-07-28 PROCEDURE — 85025 COMPLETE CBC W/AUTO DIFF WBC: CPT | Performed by: INTERNAL MEDICINE

## 2025-07-28 PROCEDURE — 63710000001 PREDNISONE PER 1 MG: Performed by: INTERNAL MEDICINE

## 2025-07-28 PROCEDURE — 25010000002 ENOXAPARIN PER 10 MG: Performed by: INTERNAL MEDICINE

## 2025-07-28 PROCEDURE — 99232 SBSQ HOSP IP/OBS MODERATE 35: CPT | Performed by: INTERNAL MEDICINE

## 2025-07-28 PROCEDURE — 83735 ASSAY OF MAGNESIUM: CPT | Performed by: INTERNAL MEDICINE

## 2025-07-28 RX ORDER — DOXYCYCLINE 100 MG/1
100 CAPSULE ORAL EVERY 12 HOURS SCHEDULED
Status: DISCONTINUED | OUTPATIENT
Start: 2025-07-28 | End: 2025-07-31 | Stop reason: HOSPADM

## 2025-07-28 RX ORDER — AMLODIPINE BESYLATE 5 MG/1
5 TABLET ORAL DAILY
COMMUNITY

## 2025-07-28 RX ORDER — IPRATROPIUM BROMIDE AND ALBUTEROL SULFATE 2.5; .5 MG/3ML; MG/3ML
3 SOLUTION RESPIRATORY (INHALATION)
Status: DISCONTINUED | OUTPATIENT
Start: 2025-07-28 | End: 2025-07-31 | Stop reason: HOSPADM

## 2025-07-28 RX ORDER — AMLODIPINE BESYLATE 5 MG/1
5 TABLET ORAL
Status: DISCONTINUED | OUTPATIENT
Start: 2025-07-28 | End: 2025-07-31 | Stop reason: HOSPADM

## 2025-07-28 RX ORDER — ENOXAPARIN SODIUM 100 MG/ML
40 INJECTION SUBCUTANEOUS EVERY 24 HOURS
Status: DISCONTINUED | OUTPATIENT
Start: 2025-07-28 | End: 2025-07-31 | Stop reason: HOSPADM

## 2025-07-28 RX ORDER — QUETIAPINE FUMARATE 25 MG/1
25 TABLET, FILM COATED ORAL NIGHTLY PRN
COMMUNITY

## 2025-07-28 RX ORDER — QUETIAPINE FUMARATE 25 MG/1
25 TABLET, FILM COATED ORAL NIGHTLY PRN
Status: DISCONTINUED | OUTPATIENT
Start: 2025-07-28 | End: 2025-07-31 | Stop reason: HOSPADM

## 2025-07-28 RX ORDER — PREDNISONE 20 MG/1
40 TABLET ORAL
Status: DISCONTINUED | OUTPATIENT
Start: 2025-07-28 | End: 2025-07-31 | Stop reason: HOSPADM

## 2025-07-28 RX ORDER — LISINOPRIL 40 MG/1
40 TABLET ORAL
Status: DISCONTINUED | OUTPATIENT
Start: 2025-07-28 | End: 2025-07-31 | Stop reason: HOSPADM

## 2025-07-28 RX ADMIN — AMLODIPINE BESYLATE 5 MG: 5 TABLET ORAL at 17:09

## 2025-07-28 RX ADMIN — BUSPIRONE HYDROCHLORIDE 10 MG: 10 TABLET ORAL at 06:16

## 2025-07-28 RX ADMIN — BUSPIRONE HYDROCHLORIDE 10 MG: 10 TABLET ORAL at 13:15

## 2025-07-28 RX ADMIN — IPRATROPIUM BROMIDE AND ALBUTEROL SULFATE 3 ML: 2.5; .5 SOLUTION RESPIRATORY (INHALATION) at 12:42

## 2025-07-28 RX ADMIN — IPRATROPIUM BROMIDE AND ALBUTEROL SULFATE 3 ML: .5; 3 SOLUTION RESPIRATORY (INHALATION) at 09:33

## 2025-07-28 RX ADMIN — ATENOLOL 25 MG: 25 TABLET ORAL at 08:10

## 2025-07-28 RX ADMIN — IPRATROPIUM BROMIDE AND ALBUTEROL SULFATE 3 ML: 2.5; .5 SOLUTION RESPIRATORY (INHALATION) at 19:11

## 2025-07-28 RX ADMIN — Medication 10 ML: at 20:52

## 2025-07-28 RX ADMIN — DOXYCYCLINE 100 MG: 100 CAPSULE ORAL at 20:52

## 2025-07-28 RX ADMIN — CITALOPRAM HYDROBROMIDE 20 MG: 20 TABLET ORAL at 08:10

## 2025-07-28 RX ADMIN — FLUTICASONE PROPIONATE 2 SPRAY: 50 SPRAY, METERED NASAL at 08:10

## 2025-07-28 RX ADMIN — PREDNISONE 40 MG: 20 TABLET ORAL at 13:15

## 2025-07-28 RX ADMIN — DULOXETINE 60 MG: 60 CAPSULE, DELAYED RELEASE ORAL at 08:11

## 2025-07-28 RX ADMIN — QUETIAPINE FUMARATE 25 MG: 25 TABLET ORAL at 20:52

## 2025-07-28 RX ADMIN — DOXYCYCLINE 100 MG: 100 CAPSULE ORAL at 13:15

## 2025-07-28 RX ADMIN — ENOXAPARIN SODIUM 40 MG: 100 INJECTION SUBCUTANEOUS at 08:18

## 2025-07-28 RX ADMIN — IPRATROPIUM BROMIDE AND ALBUTEROL SULFATE 3 ML: 2.5; .5 SOLUTION RESPIRATORY (INHALATION) at 23:47

## 2025-07-28 RX ADMIN — IPRATROPIUM BROMIDE AND ALBUTEROL SULFATE 3 ML: 2.5; .5 SOLUTION RESPIRATORY (INHALATION) at 16:58

## 2025-07-28 RX ADMIN — BUSPIRONE HYDROCHLORIDE 10 MG: 10 TABLET ORAL at 20:52

## 2025-07-28 RX ADMIN — LISINOPRIL 40 MG: 40 TABLET ORAL at 13:15

## 2025-07-28 RX ADMIN — Medication 10 ML: at 08:11

## 2025-07-28 NOTE — THERAPY EVALUATION
Patient Name: Stefanie Russell  : 1945    MRN: [id number]                              Today's Date: 2025       Admit Date: 2025    Visit Dx:     ICD-10-CM ICD-9-CM   1. Acute respiratory failure with hypoxia  J96.01 518.81   2. COPD with acute exacerbation  J44.1 491.21   3. Moderate mixed hyperlipidemia not requiring statin therapy  E78.2 272.2   4. Primary hypertension  I10 401.9     Patient Active Problem List   Diagnosis    Multiple closed fractures of pelvis with stable disruption of pelvic [place]    History of pelvic fracture    Fall at home, initial encounter    Osteoporosis    Frequent falls    Head trauma    Hypertension    Hyperlipidemia    Sepsis due to group A Streptococcus    Dysphagia    Respiratory distress    Acute streptococcal pharyngitis    Bacteremia due to Streptococcus    Acute exacerbation of chronic obstructive pulmonary disease (COPD)     Past Medical History:   Diagnosis Date    High cholesterol     History of pelvic fracture 2018    Hyperlipidemia     Hypertension     Osteoarthritis     Osteoporosis      Past Surgical History:   Procedure Laterality Date    COLON SURGERY      HYSTERECTOMY      SHOULDER SURGERY      rotator cuff (left)    TONSILLECTOMY        General Information       Row Name 25 0858          Physical Therapy Time and Intention    Document Type evaluation  -ML     Mode of Treatment physical therapy  -ML       Row Name 25 0858          General Information    Patient Profile Reviewed yes  -ML     Prior Level of Function independent:;all household mobility;community mobility;gait;transfer;bed mobility;ADL's  patient ambulates with 3 wheel walker in community, ambulates with rollator for household distances, uses shower chair  -ML     Existing Precautions/Restrictions fall;oxygen therapy device and L/min  -ML     Barriers to Rehab none identified  -ML       Row Name 25 0858          Living Environment    Current Living Arrangements home   -ML     People in Home child(idalia), adult  dtr  -ML       Row Name 07/28/25 0858          Home Main Entrance    Number of Stairs, Main Entrance other (see comments)  ramp  -ML     Stair Railings, Main Entrance none  -ML       Row Name 07/28/25 0858          Stairs Within Home, Primary    Number of Stairs, Within Home, Primary none  -ML       Row Name 07/28/25 0858          Cognition    Orientation Status (Cognition) oriented x 4  -ML       Row Name 07/28/25 0858          Safety Issues/Impairments Affecting Functional Mobility    Safety Issues Affecting Function (Mobility) safety precaution awareness  -ML     Impairments Affecting Function (Mobility) endurance/activity tolerance;strength;shortness of breath  -ML               User Key  (r) = Recorded By, (t) = Taken By, (c) = Cosigned By      Initials Name Provider Type    ML Precious Cruz Physical Therapist                   Mobility       Row Name 07/28/25 0951          Bed Mobility    Bed Mobility supine-sit  -ML     Supine-Sit [place] (Bed Mobility) modified independence  -ML     Assistive Device (Bed Mobility) head of bed elevated  -ML       Row Name 07/28/25 0951          Sit-Stand Transfer    Sit-Stand [place] (Transfers) standby assist;verbal cues  -ML     Assistive Device (Sit-Stand Transfers) walker, front-wheeled  -ML     Comment, (Sit-Stand Transfer) x2 sit to stand transfers from EOB, cues to wait for setup  -ML       Row Name 07/28/25 0951          Gait/Stairs (Locomotion)    [place] Level (Gait) contact guard  progressing to SBA  -ML     Assistive Device (Gait) walker, front-wheeled  -ML     Distance in Feet (Gait) 220  -ML     Deviations/Abnormal Patterns (Gait) gait speed decreased  -ML     Bilateral Gait Deviations forward flexed posture  -ML     Comment, (Gait/Stairs) Patient demonstrates step through gait pattern, no rest breaks required. Patient limited in ambulation distance by dyspnea. Patient remained on 4 LPM during ambulation  with oxygen saturation 94%.  -ML               User Key  (r) = Recorded By, (t) = Taken By, (c) = Cosigned By      Initials Name Provider Type    ML Precious Cruz Physical Therapist                   Obj/Interventions       Row Name 07/28/25 0952          Range of Motion Comprehensive    General Range of Motion bilateral lower extremity ROM WFL  -ML       Row Name 07/28/25 0952          Strength Comprehensive (MMT)    General Manual Muscle Testing (MMT) Assessment lower extremity strength deficits identified  -ML     Comment, General Manual Muscle Testing (MMT) Assessment BLE grossly 4/5  -ML       Row Name 07/28/25 0952          Balance    Balance Assessment sitting static balance;sitting dynamic balance;standing static balance;standing dynamic balance  -ML     Static Sitting Balance independent  -ML     Dynamic Sitting Balance standby assist  -ML     Position, Sitting Balance unsupported;sitting edge of bed  -ML     Static Standing Balance standby assist  -ML     Dynamic Standing Balance contact guard  -ML     Position/Device Used, Standing Balance supported;walker, front-wheeled  -ML     Balance Interventions sitting;standing;sit to stand;supported;occupation based/functional task  -ML       Row Name 07/28/25 0952          Sensory Assessment (Somatosensory)    Sensory Assessment (Somatosensory) sensation intact  -ML               User Key  (r) = Recorded By, (t) = Taken By, (c) = Cosigned By      Initials Name Provider Type    Precious Colunga Physical Therapist                   Goals/Plan       Row Name 07/28/25 0955          Transfer Goal 1 (PT)    Activity/Assistive Device (Transfer Goal 1, PT) sit-to-stand/stand-to-sit;bed-to-chair/chair-to-bed;walker, rolling  -ML     [place] Level/Cues Needed (Transfer Goal 1, PT) modified independence  -ML     Time Frame (Transfer Goal 1, PT) short term goal (STG);5 days  -ML       Row Name 07/28/25 0955          Gait Training Goal 1 (PT)    Activity/Assistive Device  (Gait Training Goal 1, PT) gait (walking locomotion);improve balance and speed;increase endurance/gait distance;walker, rolling  -ML     [place] Level (Gait Training Goal 1, PT) modified independence  -ML     Distance (Gait Training Goal 1, PT) 400  -ML     Time Frame (Gait Training Goal 1, PT) long term goal (LTG);10 days  -ML       Row Name 07/28/25 0955          Therapy Assessment/Plan (PT)    Planned Therapy Interventions (PT) balance training;gait training;home exercise program;patient/family education;postural re-education;strengthening;transfer training  -ML               User Key  (r) = Recorded By, (t) = Taken By, (c) = Cosigned By      Initials Name Provider Type    ML Precious Cruz Physical Therapist                   Clinical Impression       Row Name 07/28/25 0953          Pain    Pretreatment Pain Rating 0/10 - no pain  -ML     Posttreatment Pain Rating 0/10 - no pain  -ML       Row Name 07/28/25 0953          Plan of Care Review    Plan of Care Reviewed With patient  -ML     Outcome Evaluation Physical therapy evaluation complete. The patient limited in ambulation distance by dyspnea, patient remained on 4LPM with oxygen saturation 94% at end of walk. Patient presents below baseline for mobility and would continue to benefit from skilled PT to address strength and activity tolerance deficits. Patient owns needed DME.  -ML       Row Name 07/28/25 0953          Therapy Assessment/Plan (PT)    Patient/Family Therapy Goals Statement (PT) return home  -ML     Rehab Potential (PT) good  -ML     Criteria for Skilled Interventions Met (PT) yes;meets criteria  -ML     Therapy Frequency (PT) daily  -ML     Predicted Duration of Therapy Intervention (PT) 5 days  -ML       Row Name 07/28/25 0953          Vital Signs    Pre Systolic BP Rehab 153  -ML     Pre Treatment Diastolic BP 80  -ML     Pretreatment Heart Rate (beats/min) 77  -ML     Posttreatment Heart Rate (beats/min) 73  -ML     Pre SpO2 (%) 96  -ML      Intra SpO2 (%) 94  -ML     Post SpO2 (%) 97  -ML     Pre Patient Position Supine  -ML     Intra Patient Position Standing  -ML     Post Patient Position Sitting  -ML       Row Name 07/28/25 0953          Positioning and Restraints    Pre-Treatment Position in bed  -ML     Post Treatment Position chair  -ML     In Chair notified nsg;reclined;call light within reach;encouraged to call for assist;exit alarm on;waffle cushion;legs elevated  -ML               User Key  (r) = Recorded By, (t) = Taken By, (c) = Cosigned By      Initials Name Provider Type    Precious Colunga Physical Therapist                   Outcome Measures       Row Name 07/28/25 0957          How much help from another person do you currently need...    Turning from your back to your side while in flat bed without using bedrails? 4  -ML     Moving from lying on back to sitting on the side of a flat bed without bedrails? 4  -ML     Moving to and from a bed to a chair (including a wheelchair)? 3  -ML     Standing up from a chair using your arms (e.g., wheelchair, bedside chair)? 4  -ML     Climbing 3-5 steps with a railing? 3  -ML     To walk in hospital room? 3  -ML     AM-PAC 6 Clicks Score (PT) 21  -ML     Highest Level of Mobility Goal Walk 10 Steps or More-6  -ML       Row Name 07/28/25 0957          Functional Assessment    Outcome Measure Options AM-PAC 6 Clicks Basic Mobility (PT)  -ML               User Key  (r) = Recorded By, (t) = Taken By, (c) = Cosigned By      Initials Name Provider Type    Precious Colunga Physical Therapist                                 Physical Therapy Education       Title: PT OT SLP Therapies (In Progress)       Topic: Physical Therapy (In Progress)       Point: Mobility training (Done)       Learning Progress Summary            Patient Acceptance, E, VU by JEANNE at 7/28/2025 0957                      Point: Home exercise program (Not Started)       Learner Progress:  Not documented in this visit.              Point:  Body mechanics (Not Started)       Learner Progress:  Not documented in this visit.              Point: Precautions (Done)       Learning Progress Summary            Patient Acceptance, E, VU by  at 7/28/2025 0957                                      User Key       Initials Effective Dates Name Provider Type Discipline     04/22/21 -  Precious Cruz Physical Therapist PT                  PT Recommendation and Plan  Planned Therapy Interventions (PT): balance training, gait training, home exercise program, patient/family education, postural re-education, strengthening, transfer training  Outcome Evaluation: Physical therapy evaluation complete. The patient limited in ambulation distance by dyspnea, patient remained on 4LPM with oxygen saturation 94% at end of walk. Patient presents below baseline for mobility and would continue to benefit from skilled PT to address strength and activity tolerance deficits. Patient owns needed DME.     Time Calculation:   PT Evaluation Complexity  History, PT Evaluation Complexity: 1-2 personal factors and/or comorbidities  Examination of Body Systems (PT Eval Complexity): total of 3 or more elements  Clinical Presentation (PT Evaluation Complexity): evolving  Clinical Decision Making (PT Evaluation Complexity): moderate complexity  Overall Complexity (PT Evaluation Complexity): moderate complexity     PT Charges       Row Name 07/28/25 0958             Time Calculation    Start Time 0830  -ML      PT Received On 07/28/25  -ML      PT Goal Re-Cert Due Date 08/07/25  -ML         Untimed Charges    PT Eval/Re-eval Minutes 46  -ML         Total Minutes    Untimed Charges Total Minutes 46  -ML       Total Minutes 46  -ML                User Key  (r) = Recorded By, (t) = Taken By, (c) = Cosigned By      Initials Name Provider Type     Precious Cruz Physical Therapist                  Therapy Charges for Today       Code Description Service Date Service Provider Modifiers Qty     [id number] HC PT EVAL MOD COMPLEXITY 4 7/28/2025 Precious Cruz GP 1            PT G-Codes  Outcome Measure Options: AM-PAC 6 Clicks Basic Mobility (PT)  AM-PAC 6 Clicks Score (PT): 21  PT Discharge Summary  Anticipated Discharge Disposition (PT): home with assist    Precious Cruz  7/28/2025

## 2025-07-28 NOTE — PLAN OF CARE
Goal Outcome Evaluation:  Plan of Care Reviewed With: patient        Progress: no change  Outcome Evaluation: 4L NC. SR on tele. No c/o SOA. PRN melatonin given to try to help pt sleep. Pt had a difficult time trying to rest tonight even with PRN.

## 2025-07-28 NOTE — PROGRESS NOTES
[facility] Medicine Services  PROGRESS NOTE    Patient Name: Stefanie Russell  : 1945  MRN: [id number]    Date of Admission: 2025  Primary Care Physician: Eric Brunson DO    Subjective   Subjective     CC:  F/u respiratory failure    HPI:  Patient sitting up in bedside chair, reports that her breathing is better, however still wheezing. Reports she is on no baseline oxygen at home, currently requiring 4L      Objective   Objective     Vital Signs:   Temp:  [97.9 °F (36.6 °C)-98.4 °F (36.9 °C)] 97.9 °F (36.6 °C)  Heart Rate:  [76-86] 81  Resp:  [16-20] 16  BP: (135-164)/(73-85) 164/85  Flow (L/min) (Oxygen Therapy):  [4] 4     Physical Exam:  Constitutional: No acute distress, awake, alert  HENT: NCAT, mucous membranes moist  Respiratory: bilateral expiratory wheezing present, non-labored on 4L  Cardiovascular: RRR, no murmurs, rubs, or gallops  Gastrointestinal: soft, nontender, nondistended  Musculoskeletal: No bilateral ankle edema  Psychiatric: Appropriate affect, cooperative  Neurologic: Oriented x 3, speech clear, no focal deficits  Skin: No rashes      Results Reviewed:  LAB RESULTS:      Lab 2545 25   WBC 15.03*  --  11.06*   HEMOGLOBIN 14.3  --  15.9   HEMATOCRIT 42.8  --  49.2*   PLATELETS 251  --  301   NEUTROS ABS 12.62*  --  6.18   IMMATURE GRANS (ABS) 0.10*  --  0.05   LYMPHS ABS 0.97  --  3.02   MONOS ABS 1.30*  --  1.30*   EOS ABS 0.01  --  0.39   MCV 91.8  --  92.8   PROCALCITONIN  --  0.06  --    HSTROP T  --  11 14*         Lab 25  0545 25  0433   SODIUM 137  --  140   POTASSIUM 4.3  --  3.9   CHLORIDE 102  --  106   CO2 20.1*  --  21.8*   ANION GAP 14.9  --  12.2   BUN 26.8*  --  23.0   CREATININE 1.09*  --  1.31*   EGFR 52.1*  --  41.8*   GLUCOSE 162*  --  116*   CALCIUM 8.3*  --  8.9   MAGNESIUM 2.2  --   --    PHOSPHORUS 3.6  --   --    TSH  --  1.050  --          Lab 25  8297    TOTAL PROTEIN 7.2   ALBUMIN 3.9   GLOBULIN 3.3   ALT (SGPT) 6   AST (SGOT) 17   BILIRUBIN 0.5   ALK PHOS 80         Lab 07/27/25  0545 07/27/25  0433   PROBNP  --  432.0   HSTROP T 11 14*                 Lab 07/27/25  0449   PH, ARTERIAL 7.440   PCO2, ARTERIAL 33.2*   PO2 ART 70.6*   FIO2 21   HCO3 ART 22.5   BASE EXCESS ART -0.8*   CARBOXYHEMOGLOBIN 1.3     Brief Urine Lab Results  (Last result in the past 365 days)        Color   Clarity   Blood   Leuk Est   Nitrite   Protein   CREAT   Urine HCG        07/27/25 1614 Yellow   Clear   Negative   Negative   Negative   Negative                   Microbiology Results Abnormal       None            XR Chest 1 View  Result Date: 7/27/2025  XR CHEST 1 VW Date of Exam: 7/27/2025 4:47 AM EDT Indication: SOA triage protocol Comparison: 3/25/2022. Findings: There are no airspace consolidations. Small bilateral pleural effusions are present.. No pneumothorax. The pulmonary vasculature appears within normal limits. The cardiac and mediastinal silhouette appear unremarkable. No acute osseous abnormality identified.     Impression: Impression: Small bilateral pleural effusions. Electronically Signed: Tracey Reddy MD  7/27/2025 5:50 AM EDT  Workstation ID: [id number]      Results for orders placed during the hospital encounter of 07/27/25    Adult Transthoracic Echo Complete W/ Cont if Necessary Per Protocol 07/27/2025  1:42 PM    Interpretation Summary    Left ventricular systolic function is normal. Left ventricular ejection fraction appears to be 66 - 70%.    Left ventricular wall thickness is consistent with hypertrophy. Sigmoid-shaped ventricular septum is present.    Left ventricular diastolic function is consistent with (grade I) impaired relaxation.    Trace aortic regurgitation.    Trace mitral regurgitation.    Left ventricular intracavitary gradient is noted noted any evidence of outflow tract gradient.      Current medications:  Scheduled Meds:atenolol, 25 mg, Oral,  Daily  busPIRone, 10 mg, Oral, Q8H  citalopram, 20 mg, Oral, Daily  DULoxetine, 60 mg, Oral, Daily  enoxaparin sodium, 40 mg, Subcutaneous, Q24H  fluticasone, 2 spray, Nasal, Daily  ipratropium-albuterol, 3 mL, Nebulization, Q4H - RT  predniSONE, 40 mg, Oral, Daily With Breakfast  sodium chloride, 10 mL, Intravenous, Q12H      Continuous Infusions:   PRN Meds:.  acetaminophen **OR** acetaminophen **OR** acetaminophen    senna-docusate sodium **AND** polyethylene glycol **AND** bisacodyl **AND** bisacodyl    Calcium Replacement - Follow Nurse / BPA Driven Protocol    Magnesium Standard Dose Replacement - Follow Nurse / BPA Driven Protocol    melatonin    ondansetron    Phosphorus Replacement - Follow Nurse / BPA Driven Protocol    Potassium Replacement - Follow Nurse / BPA Driven Protocol    sodium chloride    sodium chloride    sodium chloride    Assessment & Plan   Assessment & Plan     Active Hospital Problems    Diagnosis  POA    **Acute exacerbation of chronic obstructive pulmonary disease (COPD) [J44.1]  Yes    Hypertension [I10]  Yes    Hyperlipidemia [E78.5]  Yes    Dysphagia [R13.10]  Yes    Frequent falls [R29.6]  Not Applicable      Resolved Hospital Problems   No resolved problems to display.        Brief Hospital Course to date:  Stefanie Russell is a 80 y.o. female with PMHx significant for HTN, HLD, COPD, anxiety/depression, GERD, CKDII, osteoporosis who presents with increasing SOA.    Acute Respiratory Failure w/Hypoxia  AECOPD  - CXR without acute fidings, small effusions, no pneumonia. consider CT chest if no improvement  - on no baseline O2, currently on 4L  - schedule duo-nebs  - empiric doxy x 5 days, mucinex  - short steroid burst, prednisone x 5 days  - Echo showing normal EF 66-70% and in setting of normal BNP doubt significant volume overload contributing, hold further diuretics and monitor    HTN  - continue atenolol, resume lisinopril    HLD:  - continue statin    CKDII:  - creatinine at  baseline    Anxiety/Depression:  - continue home regimen    Expected Discharge Location and Transportation: home  Expected Discharge   Expected discharge date/ time has not been documented.     VTE Prophylaxis:  Pharmacologic & mechanical VTE prophylaxis orders are present.         AM-PAC 6 Clicks Score (PT): 21 (07/28/25 2652)    CODE STATUS:   Code Status and Medical Interventions: CPR (Attempt to Resuscitate); Full Support   Ordered at: 07/27/25 0649     Code Status (Patient has no pulse and is not breathing):    CPR (Attempt to Resuscitate)     Medical Interventions (Patient has pulse or is breathing):    Full Support       Dasha Spangler,   07/28/25

## 2025-07-28 NOTE — PAYOR COMM NOTE
"Stefanie Clayton (80 y.o. Female)     Bryanna Rodrigues, KARISSA  Utilization Review  [phone]  [phone]      Observation status request           Date of Birth   1945    Social Security Number       Address   [address] [address]    Home Phone   [phone]    MRN   [id number]       [facility]    Marital Status                               Admission Date   2025    Admission Type   Emergency    Admitting Provider   Dasha Spangler DO    Attending Provider   Dasha Spangler DO    Department, Room/Bed   [facility] 6B, N640/1       Discharge Date       Discharge Disposition       Discharge Destination                                 Attending Provider: Dasha Spangler DO    Allergies: No Known Allergies    Isolation: None   Infection: None   Code Status: CPR    Ht: 144.8 cm (57.01\")   Wt: 69.7 kg (153 lb 10.6 oz)    Admission Cmt: None   Principal Problem: Acute exacerbation of chronic obstructive pulmonary disease (COPD) [J44.1]                   Active Insurance as of 2025       Primary Coverage       Payor Plan Insurance Group Employer/Plan Group    [facility] MEDICARE [facility]PLACEMENT [facility]        Payor Plan Address Payor Plan Phone Number Payor Plan Fax Number Effective Dates    [address] [address]   2025 - None Entered    [facility] [id number]         Subscriber Name Subscriber Birth Date Member ID       STEFANIE CLAYTON 1945 [id number]                     Emergency Contacts        (Rel.) Home Phone Work Phone Mobile Phone    Inez Alvarez (Daughter) [phone] -- [phone]    Drew,Sister [phone] -- --                 History & Physical        Sayra Churchill DO at 25 0649              [facility] Medicine Services  HISTORY AND PHYSICAL    Patient Name: Stefanie Clayton  : 1945  MRN: [id number]  Primary Care Physician: " Eric Brunson DO  Date of admission: 7/27/2025      Subjective  Subjective     Chief Complaint:  Shortness of breath    HPI:  Stefanie Russell is a 80 y.o. female with history of hypertension, mood disorder who developed worsening shortness of breath over the last 48 hours.  She states she having trouble catching her breath with normal activities.  She also notes some fullness in her abdomen but not sure if she is actually gained any weight.  She does not take a diuretic at baseline.  She recently had a significant stressful event about 3 months ago with the death of a great nephew.      Personal History     Past Medical History:   Diagnosis Date    High cholesterol     History of pelvic fracture 1/8/2018    Hyperlipidemia     Hypertension     Osteoarthritis     Osteoporosis            Past Surgical History:   Procedure Laterality Date    COLON SURGERY      HYSTERECTOMY      SHOULDER SURGERY      rotator cuff (left)    TONSILLECTOMY         Family History: family history includes Cancer in her mother; Heart attack in her father; Heart disease in her father; Heart failure in her father; Hypertension in her father and mother; Lymphoma in her mother; Melanoma in her mother; Osteoarthritis in her mother; Stroke in her father.     Social History:  reports that she has never smoked. She has never used smokeless tobacco. She reports that she does not drink alcohol and does not use drugs.  Social History     Social History Narrative    Not on file       Medications:  Available home medication information reviewed.  DULoxetine, QUEtiapine, alendronate, atenolol, busPIRone, calcium carb-cholecalciferol, cephalexin, citalopram, diclofenac, fluticasone, lisinopril, lovastatin, silver sulfadiazine, and venlafaxine XR    No Known Allergies    Objective  Objective     Vital Signs:   Temp:  [98.2 °F (36.8 °C)] 98.2 °F (36.8 °C)  Heart Rate:  [] 107  Resp:  [18] 18  BP: (141-169)/() 152/77  Flow (L/min) (Oxygen  Therapy):  [2] 2       Physical Exam  Constitutional:       General: She is not in acute distress.     Appearance: Normal appearance.   HENT:      Mouth/Throat:      Mouth: Mucous membranes are dry.   Cardiovascular:      Rate and Rhythm: Normal rate and regular rhythm.   Pulmonary:      Effort: No respiratory distress.      Comments: Conversational dyspnea  Abdominal:      General: There is distension.      Palpations: Abdomen is soft.      Tenderness: There is no abdominal tenderness.   Musculoskeletal:      Comments: Trace LE edema   Skin:     General: Skin is warm and dry.   Neurological:      Mental Status: She is alert and oriented to person, place, and time.            Result Review:  I have personally reviewed the results from the time of this admission to 7/27/2025 07:59 EDT and agree with these findings:  [x]  Laboratory list / accordion  []  Microbiology  [x]  Radiology  []  EKG/Telemetry   []  Cardiology/Vascular   []  Pathology  [x]  Old records  []  Other:  Most notable findings include:       LAB RESULTS:      Lab 07/27/25  0545 07/27/25 0433   WBC  --  11.06*   HEMOGLOBIN  --  15.9   HEMATOCRIT  --  49.2*   PLATELETS  --  301   NEUTROS ABS  --  6.18   IMMATURE GRANS (ABS)  --  0.05   LYMPHS ABS  --  3.02   MONOS ABS  --  1.30*   EOS ABS  --  0.39   MCV  --  92.8   PROCALCITONIN 0.06  --          Lab 07/27/25  0545 07/27/25  0433   SODIUM  --  140   POTASSIUM  --  3.9   CHLORIDE  --  106   CO2  --  21.8*   ANION GAP  --  12.2   BUN  --  23.0   CREATININE  --  1.31*   EGFR  --  41.8*   GLUCOSE  --  116*   CALCIUM  --  8.9   TSH 1.050  --          Lab 07/27/25 0433   TOTAL PROTEIN 7.2   ALBUMIN 3.9   GLOBULIN 3.3   ALT (SGPT) 6   AST (SGOT) 17   BILIRUBIN 0.5   ALK PHOS 80         Lab 07/27/25  0545 07/27/25 0433   PROBNP  --  432.0   HSTROP T 11 14*                 Lab 07/27/25 0449   PH, ARTERIAL 7.440   PCO2, ARTERIAL 33.2*   PO2 ART 70.6*   FIO2 21   HCO3 ART 22.5   BASE EXCESS ART -0.8*    CARBOXYHEMOGLOBIN 1.3         Microbiology Results (last 10 days)       ** No results found for the last 240 hours. **            XR Chest 1 View  Result Date: 7/27/2025  XR CHEST 1 VW Date of Exam: 7/27/2025 4:47 AM EDT Indication: SOA triage protocol Comparison: 3/25/2022. Findings: There are no airspace consolidations. Small bilateral pleural effusions are present.. No pneumothorax. The pulmonary vasculature appears within normal limits. The cardiac and mediastinal silhouette appear unremarkable. No acute osseous abnormality identified.     Impression: Impression: Small bilateral pleural effusions. Electronically Signed: Tracey Reddy MD  7/27/2025 5:50 AM EDT  Workstation ID: [id number]          Assessment & Plan  Assessment & Plan       Acute exacerbation of chronic obstructive pulmonary disease (COPD)        80-year-old lady with history of hypertension admitted for worsening shortness of breath, suspected volume overload    Dyspnea  - Likely secondary to volume overload  - Lasix 60 mg IV x 1  - Check echo  - Daily weights  - Replace electrolytes per protocol  - Check TSH, T4    Hypertension  - Hold lisinopril, unsure what her baseline renal function is  - Continue atenolol with hold parameters    ?  PABLO versus CKD  - Monitor renal function for now  - Check UA    Mood disorder  - Continue home meds        VTE Prophylaxis:  Mechanical VTE prophylaxis orders are present.          CODE STATUS:    Code Status and Medical Interventions: CPR (Attempt to Resuscitate); Full Support   Ordered at: 07/27/25 0649     Code Status (Patient has no pulse and is not breathing):    CPR (Attempt to Resuscitate)     Medical Interventions (Patient has pulse or is breathing):    Full Support       Expected Discharge   Expected discharge date/ time has not been documented.     Sayra Churchill DO  07/27/25     Electronically signed by Sayra Churchill DO at 07/27/25 0800          Emergency Department Notes        Garrick  Kari, RN at 07/27/25 0651           Stefanie Russell    Nursing Report ED to Floor:  Mental status: a&ox4  Ambulatory status: non ambulatory in ed/ rolator  Oxygen Therapy:  3l nc, RA baseline  Cardiac Rhythm: nsr  Admitted from: home  Safety Concerns:  na  Precautions: na  Social Issues: na  ED Room #:  20    ED Nurse Phone Extension - [phone] or may call [phone].      HPI:   Chief Complaint   Patient presents with    Shortness of Breath       Past Medical History:  Past Medical History:   Diagnosis Date    High cholesterol     History of pelvic fracture 1/8/2018    Hyperlipidemia     Hypertension     Osteoarthritis     Osteoporosis         Past Surgical History:  Past Surgical History:   Procedure Laterality Date    COLON SURGERY      HYSTERECTOMY      SHOULDER SURGERY      rotator cuff (left)    TONSILLECTOMY          Admitting Doctor:   Charlie Laughlin III, DO    Consulting Provider(s):  Consults       No orders found from 6/28/2025 to 7/28/2025.             Admitting Diagnosis:   The primary encounter diagnosis was Acute respiratory failure with hypoxia. Diagnoses of COPD with acute exacerbation, Moderate mixed hyperlipidemia not requiring statin therapy, and Primary hypertension were also pertinent to this visit.    Most Recent Vitals:   Vitals:    07/27/25 0500 07/27/25 0530 07/27/25 0600 07/27/25 0630   BP: 152/84 158/85 142/82 147/84   Pulse: 99 84 89 92   Resp:       Temp:       SpO2: 90% 91% (!) 89% 90%   Weight:       Height:           Active LDAs/IV Access:   Lines, Drains & Airways       Active LDAs       Name Placement date Placement time Site Days    PICC Double Lumen 04/23/18 Right Basilic 04/23/18  1118  Basilic  2651    Peripheral IV 07/27/25 0439 20 G Right Antecubital 07/27/25  0439  Antecubital  less than 1    Peripheral IV 07/27/25 0440 20 G Right Antecubital 07/27/25  0440  Antecubital  less than 1                    Labs (abnormal labs have a star):   Labs Reviewed   COMPREHENSIVE  METABOLIC PANEL - Abnormal; Notable for the following components:       Result Value    Glucose 116 (*)     Creatinine 1.31 (*)     CO2 21.8 (*)     eGFR 41.8 (*)     All other components within normal limits    Narrative:     GFR Categories in Chronic Kidney Disease (CKD)              GFR Category          GFR (mL/min/1.73)    Interpretation  G1                    90 or greater        Normal or high (1)  G2                    60-89                Mild decrease (1)  G3a                   45-59                Mild to moderate decrease  G3b                   30-44                Moderate to severe decrease  G4                    15-29                Severe decrease  G5                    14 or less           Kidney failure    (1)In the absence of evidence of kidney disease, neither GFR category G1 or G2 fulfill the criteria for CKD.    eGFR calculation 2021 CKD-EPI creatinine equation, which does not include race as a factor   TROPONIN - Abnormal; Notable for the following components:    HS Troponin T 14 (*)     All other components within normal limits    Narrative:     High Sensitive Troponin T Reference Range:  <14.0 ng/L- Negative Female for AMI  <22.0 ng/L- Negative Male for AMI  >=14 - Abnormal Female indicating possible myocardial injury.  >=22 - Abnormal Male indicating possible myocardial injury.   Clinicians would have to utilize clinical acumen, EKG, Troponin, and serial changes to determine if it is an Acute Myocardial Infarction or myocardial injury due to an underlying chronic condition.        CBC WITH AUTO DIFFERENTIAL - Abnormal; Notable for the following components:    WBC 11.06 (*)     RBC 5.30 (*)     Hematocrit 49.2 (*)     Monocytes, Absolute 1.30 (*)     All other components within normal limits   BLOOD GAS, ARTERIAL W/CO-OXIMETRY - Abnormal; Notable for the following components:    pCO2, Arterial 33.2 (*)     pO2, Arterial 70.6 (*)     Base Excess, Arterial -0.8 (*)     Oxyhemoglobin 93.7 (*)      pCO2, Temperature Corrected 33.2 (*)     pO2, Temperature Corrected 70.6 (*)     All other components within normal limits   BNP (IN-HOUSE) - Normal    Narrative:     This assay is used as an aid in the diagnosis of individuals suspected of having heart failure. It can be used as an aid in the diagnosis of acute decompensated heart failure (ADHF) in patients presenting with signs and symptoms of ADHF to the emergency department (ED). In addition, NT-proBNP of <300 pg/mL indicates ADHF is not likely.    Age Range Result Interpretation  NT-proBNP Concentration (pg/mL:      <50             Positive            >450                   Gray                 300-450                    Negative             <300    50-75           Positive            >900                  Gray                300-900                  Negative            <300      >75             Positive            >1800                  Gray                300-1800                  Negative            <300   RAINBOW DRAW    Narrative:     The following orders were created for panel order [place] Draw.  Procedure                               Abnormality         Status                     ---------                               -----------         ------                     Green Top (Gel)[[id number]]                                  Final result               Lavender Top[[id number]]                                     Final result               Gold Top - SST[[id number]]                                   Final result               Haque Top[[id number]]                                         Final result               Light Blue Top[[id number]]                                   Final result                 Please view results for these tests on the individual orders.   BLOOD GAS, ARTERIAL   HIGH SENSITIVITIY TROPONIN T 1HR    Narrative:     High Sensitive Troponin T Reference Range:  <14.0 ng/L- Negative Female for AMI  <22.0 ng/L- Negative Male for AMI  >=14 -  Abnormal Female indicating possible myocardial injury.  >=22 - Abnormal Male indicating possible myocardial injury.   Clinicians would have to utilize clinical acumen, EKG, Troponin, and serial changes to determine if it is an Acute Myocardial Infarction or myocardial injury due to an underlying chronic condition.        PROCALCITONIN   TSH RFX ON ABNORMAL TO FREE T4   URINALYSIS W/ MICROSCOPIC IF INDICATED (NO CULTURE)   CBC AND DIFFERENTIAL    Narrative:     The following orders were created for panel order CBC & Differential.  Procedure                               Abnormality         Status                     ---------                               -----------         ------                     CBC Auto Differential[[id number]]        Abnormal            Final result                 Please view results for these tests on the individual orders.   GREEN TOP   LAVENDER TOP   GOLD TOP - SST   GRAY TOP   LIGHT BLUE TOP       Meds Given in ED:   Medications   sodium chloride 0.9 % flush 10 mL (has no administration in time range)   sodium chloride 0.9 % flush 10 mL (has no administration in time range)   sodium chloride 0.9 % flush 10 mL (has no administration in time range)   sodium chloride 0.9 % infusion 40 mL (has no administration in time range)   Potassium Replacement - Follow Nurse / BPA Driven Protocol (has no administration in time range)   Magnesium Standard Dose Replacement - Follow Nurse / BPA Driven Protocol (has no administration in time range)   Phosphorus Replacement - Follow Nurse / BPA Driven Protocol (has no administration in time range)   Calcium Replacement - Follow Nurse / BPA Driven Protocol (has no administration in time range)   acetaminophen (TYLENOL) tablet 650 mg (has no administration in time range)     Or   acetaminophen (TYLENOL) 160 MG/5ML oral solution 650 mg (has no administration in time range)     Or   acetaminophen (TYLENOL) suppository 650 mg (has no administration in time range)    sennosides-docusate (PERICOLACE) 8.6-50 MG per tablet 2 tablet (has no administration in time range)     And   polyethylene glycol (MIRALAX) packet 17 g (has no administration in time range)     And   bisacodyl (DULCOLAX) EC tablet 5 mg (has no administration in time range)     And   bisacodyl (DULCOLAX) suppository 10 mg (has no administration in time range)   ondansetron (ZOFRAN) injection 4 mg (has no administration in time range)   ipratropium-albuterol (DUO-NEB) nebulizer solution 3 mL (3 mL Nebulization Given 7/27/25 0440)   methylPREDNISolone sodium succinate (SOLU-Medrol) injection 125 mg (125 mg Intravenous Given 7/27/25 0440)   ipratropium-albuterol (DUO-NEB) nebulizer solution 3 mL (3 mL Nebulization Given 7/27/25 0646)           Last NIH score:                                                          Dysphagia screening results:        Romi Coma Scale:  No data recorded     CIWA:        Restraint Type:            Isolation Status:  No active isolations           Electronically signed by Kari Mccauley RN at 07/27/25 0652       Clint Contreras MD at 07/27/25 0452        Procedure Orders    1. ECG 12 Lead Dyspnea [[id number]] ordered by Clint Contreras MD                 Subjective   History of Present Illness  80-year-old female with a history of COPD presenting to the emergency department with cough and shortness of breath.  Patient has had some shortness of breath for the last week, however it has been intensifying over the last 24 hours.  She was unable to catch her breath this morning which prompted her to come to the emergency department.  Patient has had a headache mild nonproductive cough.  She has had some wheezing.  She does not normally wear oxygen at home.  Denies any fevers or chills.  No headache or change in vision.  No focal weakness.  No abdominal pain or vomiting.  No chest pain    History provided by:  Patient   used: No        Review of Systems    Constitutional:  Negative for chills and fever.   HENT:  Negative for congestion, ear pain and sore throat.    Eyes:  Negative for visual disturbance.   Respiratory:  Positive for cough, shortness of breath and wheezing.    Cardiovascular:  Negative for chest pain.   Gastrointestinal:  Negative for abdominal pain.   Genitourinary:  Negative for difficulty urinating.   Musculoskeletal:  Negative for arthralgias.   Skin:  Negative for rash.   Neurological:  Negative for dizziness, weakness and numbness.   Psychiatric/Behavioral:  Negative for agitation.        Past Medical History:   Diagnosis Date    High cholesterol     History of pelvic fracture 1/8/2018    Hyperlipidemia     Hypertension     Osteoarthritis     Osteoporosis        No Known Allergies    Past Surgical History:   Procedure Laterality Date    COLON SURGERY      HYSTERECTOMY      SHOULDER SURGERY      rotator cuff (left)    TONSILLECTOMY         Family History   Problem Relation Age of Onset    Lymphoma Mother     Melanoma Mother     Cancer Mother     Osteoarthritis Mother     Hypertension Mother     Stroke Father     Heart disease Father     Heart failure Father     Hypertension Father     Heart attack Father     Breast cancer Neg Hx     Ovarian cancer Neg Hx        Social History     Socioeconomic History    Marital status:    Tobacco Use    Smoking status: Never    Smokeless tobacco: Never   Vaping Use    Vaping status: Never Used   Substance and Sexual Activity    Alcohol use: No    Drug use: No    Sexual activity: Defer     Partners: Male           Objective   Physical Exam  Vitals and nursing note reviewed.   Constitutional:       General: She is not in acute distress.     Appearance: She is not ill-appearing or toxic-appearing.   Eyes:      Conjunctiva/sclera: Conjunctivae normal.   Cardiovascular:      Rate and Rhythm: Normal rate and regular rhythm.   Pulmonary:      Effort: Tachypnea and respiratory distress present.      Breath  sounds: Wheezing present.   Abdominal:      General: Abdomen is flat. There is no distension.      Palpations: There is no mass.      Tenderness: There is no abdominal tenderness. There is no guarding or rebound.   Musculoskeletal:         General: No deformity. Normal range of motion.   Skin:     General: Skin is warm.      Findings: No rash.   Neurological:      General: No focal deficit present.      Mental Status: She is alert and oriented to person, place, and time.      Motor: No weakness.         ECG 12 Lead Dyspnea      Date/Time: 7/27/2025 4:56 AM    Performed by: Clint Contreras MD  Authorized by: Clint Contreras MD  Interpreted by ED physician  Comparison: compared with previous ECG   Similar to previous ECG  Rhythm: sinus rhythm  Rate: normal  BPM: 87  QRS axis: normal  Conduction: conduction normal  ST Segments: ST segments normal  Other: no other findings  Clinical impression: normal ECG  Comments: Interpretation:  EKG was directly visualized by myself, interpretations as documented in hospital course.              ED Course  ED Course as of 07/27/25 0606   Sun Jul 27, 2025   0458 BP(!): 169/105 [JK]   0458 Temp: 98.2 °F (36.8 °C) [JK]   0458 Heart Rate: 96 [JK]   0458 Resp: 18 [JK]   0458 SpO2(!): 89 %  Interpretation:  Patient's repeat vitals, telemetry tracing, and pulse oximetry tracing were directly viewed and interpreted by myself.   O2 sat 89% on room air, interpreted as hypoxia.  Telemetry rhythm strip revealed a rate of 96 bpm, interpreted as normal sinus rhythm [JK]   0458 Blood Gas, Arterial With Co-Ox(!)  Interpretation:  Patient's Blood Gas was directly viewed and interpreted by myself.   Hypoxia [JK]   0547 High Sensitivity Troponin T(!) [JK]   0547 Comprehensive Metabolic Panel(!) [JK]   0547 CBC & Differential(!)  Interpretation:  Laboratory studies were reviewed and interpreted directly by myself.  CMP shows some minor elevation creatinine 1.31, troponin was emotional 14, CBC  showed some minor leukocytosis with white blood cell count of 11 [JK]   0603 XR Chest 1 View  Interpretation:  Imaging was directly visualized by myself, per my interpretations, chest x-ray showed some small bilateral pleural effusions. [JK]   0604 Patient continues to have some mild hypoxia despite additional DuoNeb therapy.  Findings consistent with respiratory failure secondary to COPD exacerbation.  Patient be admitted to hospital for further evaluation and treatment [JK]   0605 Based on the patient's presentation, history and diffuse work-up in the emergency department, the patient is deemed appropriate for admission to the hospital for further evaluation and treatment.  This was discussed with the patient at bedside.  They are in agreement with the current medical management.    Admitting physician: Dr. Laughlin    Discussion was had with admitting physician regarding the laboratory and imaging findings.  We did discuss current therapeutics in the emergency department and progression of the patient.  Working diagnosis was conveyed to the admitting physician, as well as current status and prognosis for the patient.  They are in agreement with these findings and have accepted admission.    Shared decision making:   After full review of the patient's clinical presentation, review of any work-up including but not limited to laboratory studies and radiology obtained, I had a discussion with the patient.  Treatment options were discussed as well as the risks, benefits and consequences.  I discussed all findings with the patient and family members if available.  During the discussion, treatment goals were understood by all as well as any misconceptions which were addressed with the patient.  Ample time was given for any questions they may have had.  They are in agreement with the treatment plan as well as final disposition. [JK]      ED Course User Index  [JK] Clint Contreras MD                                                        Medical Decision Making  This is a a 80-year-old female presenting with some shortness of breath.  The patient is hypoxic on initial presentation and appears to be in moderate respiratory distress.  She does have diffuse wheezing.  Symptoms seem consistent with bronchitis possibly pneumonia with respiratory failure.  Patient's overall presentation is deemed critical.  Given the initial presentation and past medical history, the patient has a high risk of serious morbidity and mortality.  Patient was immediately evaluated in the Emergency Department.  IV access was established in the patient.  Placed on continuous telemetry monitoring.  Supplied supplemental oxygen.  Patient ministered DuoNeb therapy as well as supplemental steroids.  Differential is broad and will require extensive treatment and evaluation.  Workup initiated.      Differential diagnosis: COPD exacerbation, acute respiratory failure, bronchitis, pneumonia, CHF, CAD, dysrhythmia, acute kidney injury, electrolyte abnormality, anemia      Amount and/or Complexity of Data Reviewed  Independent Historian: caregiver  External Data Reviewed: labs, radiology, ECG and notes.     Details: External laboratories, imaging as well as notes were reviewed personally by myself.  All relevant studies were used to guide decision making.     Date of previous record: 4/25/2025    Source of note: PCP    Summary:  Patient was seen and evaluated for routine visit.  I did review basic laboratory studies on file as well as a previous chest x-ray and EKG.  Records reviewed    Labs: ordered. Decision-making details documented in ED Course.  Radiology: ordered and independent interpretation performed. Decision-making details documented in ED Course.  ECG/medicine tests: ordered and independent interpretation performed. Decision-making details documented in ED Course.    Risk  Prescription drug management.    Critical Care  Total time providing critical care:  35 minutes (Authorized and performed by: Clint Contreras MD  I personally spent a total of 35 minutes of critical care time with the patient.  Due to the high probability of clinically significant, life-threatening deterioration, the patient required my highest level of care to intervene emergently.  These interventions, including, but not limited to, establishing IV access, continuous pulse oximeter and telemetry monitoring, frequent monitoring and reevaluations, management the patient's airway and cardiovascular system, discussion with other consultants as needed, which bear directly on the management the patient.  This also includes obtaining history, examining the patient, frequent reevaluations and coordinating high level of care.  Failure to emergently initiate these interventions would carry a high probability of resulting in sudden, clinically significant or life threatening deterioration in the patient's condition.  This does not include time spent on separately reported billable procedures.)        Final diagnoses:   Acute respiratory failure with hypoxia   COPD with acute exacerbation   Moderate mixed hyperlipidemia not requiring statin therapy   Primary hypertension       ED Disposition  ED Disposition       ED Disposition   Decision to Admit    Condition   --    Comment   --               No follow-up provider specified.       Medication List      No changes were made to your prescriptions during this visit.            Clint Contreras MD  07/27/25 0606      Electronically signed by Clint Contreras MD at 07/27/25 0606       Vital Signs (last day)       Date/Time Temp Temp src Pulse Resp BP Patient Position SpO2    07/28/25 1242 -- -- 76 16 -- -- 97    07/28/25 1122 97.9 (36.6) Oral 81 16 164/85 Lying 95    07/28/25 0933 -- -- 77 20 -- -- 95    07/28/25 0810 98 (36.7) Oral 81 20 153/80 Sitting --    07/28/25 0809 -- -- 80 -- -- -- 92    07/28/25 0415 98.4 (36.9) Oral 81 18 147/73 Lying 93     07/27/25 2343 98.3 (36.8) Oral 76 18 138/78 Lying 92    07/27/25 2034 98.4 (36.9) Oral 78 18 138/74 Lying 90    07/27/25 1648 -- -- 77 16 -- -- 92    07/27/25 1410 98.1 (36.7) Oral 86 16 135/82 Sitting 93    07/27/25 0929 -- -- 101 -- -- -- 92    07/27/25 0821 97.9 (36.6) Oral 119 20 153/100 Lying 90    07/27/25 0730 -- -- 107 -- 152/77 -- 91    07/27/25 0700 -- -- 98 -- 162/68 -- 90    07/27/25 0630 -- -- 92 -- 147/84 -- 90    07/27/25 0600 -- -- 89 -- 142/82 -- 89    07/27/25 0530 -- -- 84 -- 158/85 -- 91    07/27/25 0500 -- -- 99 -- 152/84 -- 90    07/27/25 0440 -- -- 89 18 -- -- 89    07/27/25 0430 -- -- 92 -- 141/108 -- 89    07/27/25 0410 98.2 (36.8) -- 96 18 169/105 -- 93          Oxygen Therapy (last day)       Date/Time SpO2 Device (Oxygen Therapy) Flow (L/min) (Oxygen Therapy) Oxygen Concentration (%) ETCO2 (mmHg)    07/28/25 1242 97 humidified;nasal cannula 4 -- --    07/28/25 1122 95 -- -- -- --    07/28/25 0933 95 humidified;nasal cannula 4 -- --    07/28/25 0810 -- humidified;nasal cannula 4 -- --    07/28/25 0809 92 -- -- -- --    07/28/25 0600 -- humidified;nasal cannula 4 -- --    07/28/25 0415 93 -- -- -- --    07/28/25 0400 -- humidified;nasal cannula 4 -- --    07/28/25 0200 -- humidified;nasal cannula 4 -- --    07/28/25 0000 -- nasal cannula;humidified 4 -- --    07/27/25 2343 92 -- -- -- --    07/27/25 2200 -- humidified;nasal cannula 4 -- --    07/27/25 2034 90 humidified;nasal cannula 4 -- --    07/27/25 1800 -- humidified;nasal cannula 4 -- --    07/27/25 1648 92 humidified;nasal cannula 4 -- --    07/27/25 1600 -- humidified;nasal cannula 4 -- --    07/27/25 1410 93 -- -- -- --    07/27/25 1400 -- humidified;nasal cannula 4 -- --    07/27/25 1200 -- humidified;nasal cannula 4 -- --    07/27/25 1000 -- humidified;nasal cannula 4 -- --    07/27/25 0929 92 -- -- -- --    07/27/25 0822 -- humidified;nasal cannula 4 -- --    07/27/25 0821 90 -- -- -- --    07/27/25 0730 91 -- -- -- --     07/27/25 0700 90 -- -- -- --    07/27/25 0646 -- nasal cannula 2 -- --    07/27/25 0630 90 -- -- -- --    07/27/25 0600 89 -- -- -- --    07/27/25 0530 91 -- -- -- --    07/27/25 0500 90 nasal cannula 2 -- --    07/27/25 0440 89 room air -- -- --    07/27/25 0430 89 -- -- -- --    07/27/25 0410 93 -- -- -- --          Lines, Drains & Airways       Active LDAs       Name Placement date Placement time Site Days    Peripheral IV 07/27/25 0440 20 G Right Antecubital 07/27/25  0440  Antecubital  1                  Current Facility-Administered Medications   Medication Dose Route Frequency Provider Last Rate Last Admin    acetaminophen (TYLENOL) tablet 650 mg  650 mg Oral Q4H PRN Sayra Churchill DO        Or    acetaminophen (TYLENOL) 160 MG/5ML oral solution 650 mg  650 mg Oral Q4H PRN Sayra Churchill DO        Or    acetaminophen (TYLENOL) suppository 650 mg  650 mg Rectal Q4H PRN Sayra Churchill DO        atenolol (TENORMIN) tablet 25 mg  25 mg Oral Daily Sayra Churchill DO   25 mg at 07/28/25 0810    sennosides-docusate (PERICOLACE) 8.6-50 MG per tablet 2 tablet  2 tablet Oral BID PRN Sayra Churchill DO        And    polyethylene glycol (MIRALAX) packet 17 g  17 g Oral Daily PRN Sayra Churchill DO        And    bisacodyl (DULCOLAX) EC tablet 5 mg  5 mg Oral Daily PRN Sayra Churchill DO        And    bisacodyl (DULCOLAX) suppository 10 mg  10 mg Rectal Daily PRN Sayra Churchill DO        busPIRone (BUSPAR) tablet 10 mg  10 mg Oral Q8H Sayra Churchill DO   10 mg at 07/28/25 0616    Calcium Replacement - Follow Nurse / BPA Driven Protocol   Not Applicable PRN Gilbert, Sayra Dowd, DO        citalopram (CeleXA) tablet 20 mg  20 mg Oral Daily Sayra Churchill DO   20 mg at 07/28/25 0810    doxycycline (MONODOX) capsule 100 mg  100 mg Oral Q12H Dasha Spangler,         DULoxetine (CYMBALTA) DR capsule 60 mg  60 mg Oral Daily  Sayra Churchill DO   60 mg at 07/28/25 0811    enoxaparin sodium (LOVENOX) syringe 40 mg  40 mg Subcutaneous Q24H Dasha Spangler DO   40 mg at 07/28/25 0818    fluticasone (FLONASE) 50 MCG/ACT nasal spray 2 spray  2 spray Nasal Daily Sayra Churchill DO   2 spray at 07/28/25 0810    ipratropium-albuterol (DUO-NEB) nebulizer solution 3 mL  3 mL Nebulization Q4H - RT Dasha Spangler DO   3 mL at 07/28/25 1242    lisinopril (PRINIVIL,ZESTRIL) tablet 40 mg  40 mg Oral Q24H Dasha Spangler DO        Magnesium Standard Dose Replacement - Follow Nurse / BPA Driven Protocol   Not Applicable PRN Sayra Churchill DO        melatonin tablet 5 mg  5 mg Oral Nightly PRN Heidy Celestin APRN   5 mg at 07/27/25 2035    ondansetron (ZOFRAN) injection 4 mg  4 mg Intravenous Q6H PRN Sayra Churchill DO        Phosphorus Replacement - Follow Nurse / BPA Driven Protocol   Not Applicable PRN Sayra Churchill DO        Potassium Replacement - Follow Nurse / BPA Driven Protocol   Not Applicable PRN Sayra Churchill DO        predniSONE (DELTASONE) tablet 40 mg  40 mg Oral Daily With Breakfast Dasha Spangler DO        QUEtiapine (SEROquel) tablet 25 mg  25 mg Oral Nightly PRN Dasha Spangler DO        sodium chloride 0.9 % flush 10 mL  10 mL Intravenous PRN Clint Contreras MD        sodium chloride 0.9 % flush 10 mL  10 mL Intravenous Q12H Sayra Churchill DO   10 mL at 07/28/25 0811    sodium chloride 0.9 % flush 10 mL  10 mL Intravenous PRN Sayra Churchill DO        sodium chloride 0.9 % infusion 40 mL  40 mL Intravenous PRN Sayra Churchill DO         Lab Results (last 24 hours)       Procedure Component Value Units Date/Time    Basic Metabolic Panel [[id number]]  (Abnormal) Collected: 07/28/25 0414    Specimen: Blood Updated: 07/28/25 0514     Glucose 162 mg/dL      BUN 26.8 mg/dL      Creatinine 1.09 mg/dL      Sodium 137 mmol/L       Potassium 4.3 mmol/L      Comment: Specimen hemolyzed.  Result may be falsely elevated.        Chloride 102 mmol/L      CO2 20.1 mmol/L      Calcium 8.3 mg/dL      BUN/Creatinine Ratio 24.6     Anion Gap 14.9 mmol/L      eGFR 52.1 mL/min/1.73     Narrative:      GFR Categories in Chronic Kidney Disease (CKD)              GFR Category          GFR (mL/min/1.73)    Interpretation  G1                    90 or greater        Normal or high (1)  G2                    60-89                Mild decrease (1)  G3a                   45-59                Mild to moderate decrease  G3b                   30-44                Moderate to severe decrease  G4                    15-29                Severe decrease  G5                    14 or less           Kidney failure    (1)In the absence of evidence of kidney disease, neither GFR category G1 or G2 fulfill the criteria for CKD.    eGFR calculation 2021 CKD-EPI creatinine equation, which does not include race as a factor    Magnesium [[id number]]  (Normal) Collected: 07/28/25 0414    Specimen: Blood Updated: 07/28/25 0514     Magnesium 2.2 mg/dL     Phosphorus [[id number]]  (Normal) Collected: 07/28/25 0414    Specimen: Blood Updated: 07/28/25 0511     Phosphorus 3.6 mg/dL     CBC Auto Differential [[id number]]  (Abnormal) Collected: 07/28/25 0414    Specimen: Blood Updated: 07/28/25 0446     WBC 15.03 10*3/mm3      RBC 4.66 10*6/mm3      Hemoglobin 14.3 g/dL      Hematocrit 42.8 %      MCV 91.8 fL      MCH 30.7 pg      MCHC 33.4 g/dL      RDW 14.6 %      RDW-SD 49.1 fl      MPV 11.4 fL      Platelets 251 10*3/mm3      Neutrophil % 83.9 %      Lymphocyte % 6.5 %      Monocyte % 8.6 %      Eosinophil % 0.1 %      Basophil % 0.2 %      Immature Grans % 0.7 %      Neutrophils, Absolute 12.62 10*3/mm3      Lymphocytes, Absolute 0.97 10*3/mm3      Monocytes, Absolute 1.30 10*3/mm3      Eosinophils, Absolute 0.01 10*3/mm3      Basophils, Absolute 0.03 10*3/mm3      Immature Grans,  Absolute 0.10 10*3/mm3      nRBC 0.0 /100 WBC     Urinalysis With Microscopic If Indicated (No Culture) - Urine, Clean Catch [[id number]]  (Abnormal) Collected: 07/27/25 1614    Specimen: Urine, Clean Catch Updated: 07/27/25 1626     Color, UA Yellow     Appearance, UA Clear     pH, UA <=5.0     Specific Gravity, UA 1.010     Glucose,  mg/dL (1+)     Ketones, UA Negative     Bilirubin, UA Negative     Blood, UA Negative     Protein, UA Negative     Leuk Esterase, UA Negative     Nitrite, UA Negative     Urobilinogen, UA 0.2 E.U./dL    Narrative:      Urine microscopic not indicated.          Imaging Results (Last 24 Hours)       ** No results found for the last 24 hours. **          ECG/EMG Results (last 24 hours)       Procedure Component Value Units Date/Time    Adult Transthoracic Echo Complete W/ Cont if Necessary Per Protocol [[id number]] Resulted: 07/27/25 1342     Updated: 07/27/25 1342     EF(MOD-bp) 79.3 %      LVIDd 4.2 cm      LVIDs 2.5 cm      IVSd 1.00 cm      LVPWd 1.00 cm      FS 40.5 %      IVS/LVPW 1.00 cm      ESV(cubed) 15.6 ml      LV Sys Vol (BSA corrected) 6.2 cm2      EDV(cubed) 74.1 ml      LV Mccollum Vol (BSA corrected) 30.6 cm2      LV mass(C)d 137.2 grams      LVOT area 2.27 cm2      LVOT diam 1.70 cm      EDV(MOD-sp2) 47.2 ml      EDV(MOD-sp4) 49.1 ml      ESV(MOD-sp2) 9.9 ml      ESV(MOD-sp4) 9.9 ml      SV(MOD-sp2) 37.3 ml      SV(MOD-sp4) 39.2 ml      SVi(MOD-SP2) 23.3 ml/m2      SVi(MOD-SP4) 24.4 ml/m2      SVi (LVOT) 29.8 ml/m2      EF(MOD-sp2) 79.1 %      EF(MOD-sp4) 79.8 %      MV E max balta 61.8 cm/sec      MV A max balta 101.0 cm/sec      MV dec time 0.16 sec      MV E/A 0.61     LA ESV Index (BP) 9.5 ml/m2      Med Peak E' Balta 7.2 cm/sec      Lat Peak E' Balta 8.2 cm/sec      Avg E/e' ratio 8.03     SV(LVOT) 47.9 ml      RV Base 2.40 cm      RV Mid 2.10 cm      RV Length 5.8 cm      TAPSE (>1.6) 1.35 cm      RV S' 23.3 cm/sec      LA dimension (2D)  2.40 cm      LV V1 max 144.0  cm/sec      LV V1 max PG 8.3 mmHg      LV V1 mean PG 5.0 mmHg      LV V1 VTI 21.1 cm      Ao pk melvin 158.0 cm/sec      Ao max PG 10.0 mmHg      Ao mean PG 5.0 mmHg      Ao V2 VTI 24.2 cm      JAIRO(I,D) 1.98 cm2      Dimensionless Index 0.87 (DI)      MV max PG 4.1 mmHg      MV mean PG 3.0 mmHg      MV V2 VTI 15.1 cm      MV P1/2t 60.9 msec      MVA(P1/2t) 3.6 cm2      MVA(VTI) 3.2 cm2      MV dec slope 385.0 cm/sec2      Ao root diam 3.2 cm      RAP systole 3 mmHg      Ascending aorta 3.7 cm     Narrative:        Left ventricular systolic function is normal. Left ventricular ejection   fraction appears to be 66 - 70%.    Left ventricular wall thickness is consistent with hypertrophy.   Sigmoid-shaped ventricular septum is present.    Left ventricular diastolic function is consistent with (grade I)   impaired relaxation.    Trace aortic regurgitation.    Trace mitral regurgitation.    Left ventricular intracavitary gradient is noted noted any evidence of   outflow tract gradient.               Physician Progress Notes (last 24 hours)        Dasha Spangler DO at 25 1203              [facility]  PROGRESS NOTE    Patient Name: Stefanie Russell  : 1945  MRN: [id number]    Date of Admission: 2025  Primary Care Physician: Eric Brunson DO    Subjective   Subjective     CC:  F/u respiratory failure    HPI:  Patient sitting up in bedside chair, reports that her breathing is better, however still wheezing. Reports she is on no baseline oxygen at home, currently requiring 4L      Objective   Objective     Vital Signs:   Temp:  [97.9 °F (36.6 °C)-98.4 °F (36.9 °C)] 97.9 °F (36.6 °C)  Heart Rate:  [76-86] 81  Resp:  [16-20] 16  BP: (135-164)/(73-85) 164/85  Flow (L/min) (Oxygen Therapy):  [4] 4     Physical Exam:  Constitutional: No acute distress, awake, alert  HENT: NCAT, mucous membranes moist  Respiratory: bilateral expiratory wheezing present, non-labored on  4L  Cardiovascular: RRR, no murmurs, rubs, or gallops  Gastrointestinal: soft, nontender, nondistended  Musculoskeletal: No bilateral ankle edema  Psychiatric: Appropriate affect, cooperative  Neurologic: Oriented x 3, speech clear, no focal deficits  Skin: No rashes      Results Reviewed:  LAB RESULTS:      Lab 07/28/25  0414 07/27/25  0545 07/27/25  0433   WBC 15.03*  --  11.06*   HEMOGLOBIN 14.3  --  15.9   HEMATOCRIT 42.8  --  49.2*   PLATELETS 251  --  301   NEUTROS ABS 12.62*  --  6.18   IMMATURE GRANS (ABS) 0.10*  --  0.05   LYMPHS ABS 0.97  --  3.02   MONOS ABS 1.30*  --  1.30*   EOS ABS 0.01  --  0.39   MCV 91.8  --  92.8   PROCALCITONIN  --  0.06  --    HSTROP T  --  11 14*         Lab 07/28/25  0414 07/27/25  0545 07/27/25  0433   SODIUM 137  --  140   POTASSIUM 4.3  --  3.9   CHLORIDE 102  --  106   CO2 20.1*  --  21.8*   ANION GAP 14.9  --  12.2   BUN 26.8*  --  23.0   CREATININE 1.09*  --  1.31*   EGFR 52.1*  --  41.8*   GLUCOSE 162*  --  116*   CALCIUM 8.3*  --  8.9   MAGNESIUM 2.2  --   --    PHOSPHORUS 3.6  --   --    TSH  --  1.050  --          Lab 07/27/25  0433   TOTAL PROTEIN 7.2   ALBUMIN 3.9   GLOBULIN 3.3   ALT (SGPT) 6   AST (SGOT) 17   BILIRUBIN 0.5   ALK PHOS 80         Lab 07/27/25  0545 07/27/25  0433   PROBNP  --  432.0   HSTROP T 11 14*                 Lab 07/27/25  0449   PH, ARTERIAL 7.440   PCO2, ARTERIAL 33.2*   PO2 ART 70.6*   FIO2 21   HCO3 ART 22.5   BASE EXCESS ART -0.8*   CARBOXYHEMOGLOBIN 1.3     Brief Urine Lab Results  (Last result in the past 365 days)        Color   Clarity   Blood   Leuk Est   Nitrite   Protein   CREAT   Urine HCG        07/27/25 1614 Yellow   Clear   Negative   Negative   Negative   Negative                   Microbiology Results Abnormal       None            XR Chest 1 View  Result Date: 7/27/2025  XR CHEST 1 VW Date of Exam: 7/27/2025 4:47 AM EDT Indication: SOA triage protocol Comparison: 3/25/2022. Findings: There are no airspace consolidations.  Small bilateral pleural effusions are present.. No pneumothorax. The pulmonary vasculature appears within normal limits. The cardiac and mediastinal silhouette appear unremarkable. No acute osseous abnormality identified.     Impression: Impression: Small bilateral pleural effusions. Electronically Signed: Tracey Reddy MD  7/27/2025 5:50 AM EDT  Workstation ID: [id number]      Results for orders placed during the hospital encounter of 07/27/25    Adult Transthoracic Echo Complete W/ Cont if Necessary Per Protocol 07/27/2025  1:42 PM    Interpretation Summary    Left ventricular systolic function is normal. Left ventricular ejection fraction appears to be 66 - 70%.    Left ventricular wall thickness is consistent with hypertrophy. Sigmoid-shaped ventricular septum is present.    Left ventricular diastolic function is consistent with (grade I) impaired relaxation.    Trace aortic regurgitation.    Trace mitral regurgitation.    Left ventricular intracavitary gradient is noted noted any evidence of outflow tract gradient.      Current medications:  Scheduled Meds:atenolol, 25 mg, Oral, Daily  busPIRone, 10 mg, Oral, Q8H  citalopram, 20 mg, Oral, Daily  DULoxetine, 60 mg, Oral, Daily  enoxaparin sodium, 40 mg, Subcutaneous, Q24H  fluticasone, 2 spray, Nasal, Daily  ipratropium-albuterol, 3 mL, Nebulization, Q4H - RT  predniSONE, 40 mg, Oral, Daily With Breakfast  sodium chloride, 10 mL, Intravenous, Q12H      Continuous Infusions:   PRN Meds:.  acetaminophen **OR** acetaminophen **OR** acetaminophen    senna-docusate sodium **AND** polyethylene glycol **AND** bisacodyl **AND** bisacodyl    Calcium Replacement - Follow Nurse / BPA Driven Protocol    Magnesium Standard Dose Replacement - Follow Nurse / BPA Driven Protocol    melatonin    ondansetron    Phosphorus Replacement - Follow Nurse / BPA Driven Protocol    Potassium Replacement - Follow Nurse / BPA Driven Protocol    sodium chloride    sodium chloride    sodium  chloride    Assessment & Plan   Assessment & Plan     Active Hospital Problems    Diagnosis  POA    **Acute exacerbation of chronic obstructive pulmonary disease (COPD) [J44.1]  Yes    Hypertension [I10]  Yes    Hyperlipidemia [E78.5]  Yes    Dysphagia [R13.10]  Yes    Frequent falls [R29.6]  Not Applicable      Resolved Hospital Problems   No resolved problems to display.        Brief Hospital Course to date:  Stefanie Russell is a 80 y.o. female with PMHx significant for HTN, HLD, COPD, anxiety/depression, GERD, CKDII, osteoporosis who presents with increasing SOA.    Acute Respiratory Failure w/Hypoxia  AECOPD  - CXR without acute fidings, small effusions, no pneumonia. Will get CT chest   - on no baseline O2, currently on 4L  - schedule duo-nebs  - empiric doxy x 5 days, mucinex  - short steroid burst, prednisone x 5 days  - Echo showing normal EF 66-70% and in setting of normal BNP doubt significant volume overload contributing, hold further diuretics and monitor    HTN  - continue atenolol, resume lisinopril    HLD:  - continue statin    CKDII:  - creatinine at baseline    Anxiety/Depression:  - continue home regimen    Expected Discharge Location and Transportation: home  Expected Discharge   Expected discharge date/ time has not been documented.     VTE Prophylaxis:  Pharmacologic & mechanical VTE prophylaxis orders are present.         AM-PAC 6 Clicks Score (PT): 21 (07/28/25 5585)    CODE STATUS:   Code Status and Medical Interventions: CPR (Attempt to Resuscitate); Full Support   Ordered at: 07/27/25 0635     Code Status (Patient has no pulse and is not breathing):    CPR (Attempt to Resuscitate)     Medical Interventions (Patient has pulse or is breathing):    Full Support       Dasha Spangler DO  07/28/25        Electronically signed by Dasha Spangler DO at 07/28/25 1212       Consult Notes (last 24 hours)  Notes from 07/27/25 1307 through 07/28/25 1307   No notes of this type exist for this  encounter.

## 2025-07-28 NOTE — CASE MANAGEMENT/SOCIAL WORK
Discharge Planning Assessment  [facility]     Patient Name: Stefanie Russell  MRN: [id number]  Today's Date: 7/28/2025    Admit Date: 7/27/2025    Plan: Home   Discharge Needs Assessment       Row Name 07/28/25 1353       Living Environment    People in Home child(idalia), adult    Current Living Arrangements home    Primary Care Provided by self    Provides Primary Care For no one    Family Caregiver if Needed child(idalia), adult    Family Caregiver Names Inez Alvarez, daughter and POA, at bedside    Quality of Family Relationships helpful;involved;supportive    Able to Return to Prior Arrangements yes       Transition Planning    Patient/Family Anticipates Transition to home with family    Patient/Family Anticipated Services at Transition     Transportation Anticipated family or friend will provide       Discharge Needs Assessment    Equipment Currently Used at Home nebulizer;shower chair;grab bar;cane, straight;walker, rolling;wheelchair;glucometer;bp cuff;pulse ox;commode    Concerns to be Addressed denies needs/concerns at this time    Discharge Coordination/Progress Lives in a house in [place] with her daughter Inez, normally independent with personal ADLs.  Needs assistance with laundry, shopping and housekeeping. Has a nebulizer, shower chair, grab bars, cane, walker, wheelchair, glucometer, blood pressure cuff, pulse ox and bedside commode. No home health or outpatient physical therapy.  Has an advanced directive.                   Discharge Plan       Row Name 07/28/25 1356       Plan    Plan Home    Patient/Family in Agreement with Plan yes    Plan Comments I spoke with Ms Russell and her daughter Inez at bedside.  Ms Russell resides at the address on file in a one story house in [place] with Inez and is mostly independent with personal ADLs, however needs assistance with shopping and housekeeping.  She has multiple DME including a nebulizer machine, shower chair, grab bars,  cane, walker, wheelchair, glucometer, blood pressure cuff, pulse ox and bedside commode.  She is not current with any home health or outpatient therapy services. Her daughter Inez is her POA.  Her insurance is [employer] PPO, and she does have prescription coverage.  Her PCP is Eric Brunson, and she gets her prescriptions filled at [facility] off of [facility].  At this time there are no discharge needs.  Family will be able to transport at discharge.    Final Discharge Disposition Code 01 - home or self-care                  Continued Care and Services - Admitted Since 7/27/2025    No active coordination exists.          Demographic Summary    No documentation.                  Functional Status       Row Name 07/28/25 1353       Functional Status    Usual Activity Tolerance good    Current Activity Tolerance moderate       Functional Status, IADL    Medications independent    Meal Preparation independent    Housekeeping assistive person    Laundry assistive person    Shopping assistive person       Mental Status    General Appearance WDL WDL                   Psychosocial    No documentation.                  Abuse/Neglect    No documentation.                  Legal    No documentation.                  Substance Abuse    No documentation.                  Patient Forms    No documentation.                     Ila Quiroga RN

## 2025-07-28 NOTE — PLAN OF CARE
Goal Outcome Evaluation:  Plan of Care Reviewed With: patient           Outcome Evaluation: Physical therapy evaluation complete. The patient limited in ambulation distance by dyspnea, patient remained on 4LPM with oxygen saturation 94% at end of walk. Patient presents below baseline for mobility and would continue to benefit from skilled PT to address strength and activity tolerance deficits. Patient owns needed DME.    Anticipated Discharge Disposition (PT): home with assist

## 2025-07-29 ENCOUNTER — APPOINTMENT (OUTPATIENT)
Dept: CT IMAGING | Facility: HOSPITAL | Age: 80
End: 2025-07-29
Payer: MEDICARE

## 2025-07-29 PROBLEM — J44.1 ACUTE EXACERBATION OF CHRONIC OBSTRUCTIVE AIRWAYS DISEASE: Status: ACTIVE | Noted: 2025-07-29

## 2025-07-29 PROCEDURE — 97110 THERAPEUTIC EXERCISES: CPT

## 2025-07-29 PROCEDURE — 63710000001 PREDNISONE PER 1 MG: Performed by: INTERNAL MEDICINE

## 2025-07-29 PROCEDURE — 94799 UNLISTED PULMONARY SVC/PX: CPT

## 2025-07-29 PROCEDURE — 94761 N-INVAS EAR/PLS OXIMETRY MLT: CPT

## 2025-07-29 PROCEDURE — 99232 SBSQ HOSP IP/OBS MODERATE 35: CPT | Performed by: INTERNAL MEDICINE

## 2025-07-29 PROCEDURE — 94664 DEMO&/EVAL PT USE INHALER: CPT

## 2025-07-29 PROCEDURE — 97116 GAIT TRAINING THERAPY: CPT

## 2025-07-29 PROCEDURE — 25010000002 ENOXAPARIN PER 10 MG: Performed by: INTERNAL MEDICINE

## 2025-07-29 PROCEDURE — 25010000002 FUROSEMIDE PER 20 MG: Performed by: INTERNAL MEDICINE

## 2025-07-29 PROCEDURE — 71250 CT THORAX DX C-: CPT

## 2025-07-29 PROCEDURE — 25010000002 CEFTRIAXONE PER 250 MG: Performed by: INTERNAL MEDICINE

## 2025-07-29 RX ORDER — ACETYLCYSTEINE 200 MG/ML
3 SOLUTION ORAL; RESPIRATORY (INHALATION)
Status: DISCONTINUED | OUTPATIENT
Start: 2025-07-29 | End: 2025-07-31 | Stop reason: HOSPADM

## 2025-07-29 RX ORDER — GUAIFENESIN 600 MG/1
1200 TABLET, EXTENDED RELEASE ORAL EVERY 12 HOURS SCHEDULED
Status: DISCONTINUED | OUTPATIENT
Start: 2025-07-29 | End: 2025-07-31 | Stop reason: HOSPADM

## 2025-07-29 RX ORDER — FUROSEMIDE 10 MG/ML
20 INJECTION INTRAMUSCULAR; INTRAVENOUS ONCE
Status: COMPLETED | OUTPATIENT
Start: 2025-07-29 | End: 2025-07-29

## 2025-07-29 RX ADMIN — PREDNISONE 40 MG: 20 TABLET ORAL at 08:14

## 2025-07-29 RX ADMIN — CITALOPRAM HYDROBROMIDE 20 MG: 20 TABLET ORAL at 08:14

## 2025-07-29 RX ADMIN — BUSPIRONE HYDROCHLORIDE 10 MG: 10 TABLET ORAL at 21:00

## 2025-07-29 RX ADMIN — BUSPIRONE HYDROCHLORIDE 10 MG: 10 TABLET ORAL at 06:14

## 2025-07-29 RX ADMIN — BUSPIRONE HYDROCHLORIDE 10 MG: 10 TABLET ORAL at 13:50

## 2025-07-29 RX ADMIN — IPRATROPIUM BROMIDE AND ALBUTEROL SULFATE 3 ML: 2.5; .5 SOLUTION RESPIRATORY (INHALATION) at 15:28

## 2025-07-29 RX ADMIN — Medication 10 ML: at 08:19

## 2025-07-29 RX ADMIN — CEFTRIAXONE SODIUM 1000 MG: 1 INJECTION, POWDER, FOR SOLUTION INTRAMUSCULAR; INTRAVENOUS at 13:49

## 2025-07-29 RX ADMIN — Medication 10 ML: at 20:58

## 2025-07-29 RX ADMIN — IPRATROPIUM BROMIDE AND ALBUTEROL SULFATE 3 ML: 2.5; .5 SOLUTION RESPIRATORY (INHALATION) at 19:37

## 2025-07-29 RX ADMIN — DULOXETINE 60 MG: 60 CAPSULE, DELAYED RELEASE ORAL at 08:14

## 2025-07-29 RX ADMIN — ENOXAPARIN SODIUM 40 MG: 100 INJECTION SUBCUTANEOUS at 08:14

## 2025-07-29 RX ADMIN — AMLODIPINE BESYLATE 5 MG: 5 TABLET ORAL at 08:14

## 2025-07-29 RX ADMIN — ATENOLOL 25 MG: 25 TABLET ORAL at 08:14

## 2025-07-29 RX ADMIN — GUAIFENESIN 1200 MG: 600 TABLET, EXTENDED RELEASE ORAL at 15:20

## 2025-07-29 RX ADMIN — DOXYCYCLINE 100 MG: 100 CAPSULE ORAL at 08:14

## 2025-07-29 RX ADMIN — DOXYCYCLINE 100 MG: 100 CAPSULE ORAL at 20:57

## 2025-07-29 RX ADMIN — LISINOPRIL 40 MG: 40 TABLET ORAL at 08:14

## 2025-07-29 RX ADMIN — IPRATROPIUM BROMIDE AND ALBUTEROL SULFATE 3 ML: 2.5; .5 SOLUTION RESPIRATORY (INHALATION) at 12:29

## 2025-07-29 RX ADMIN — GUAIFENESIN 1200 MG: 600 TABLET, EXTENDED RELEASE ORAL at 20:57

## 2025-07-29 RX ADMIN — IPRATROPIUM BROMIDE AND ALBUTEROL SULFATE 3 ML: 2.5; .5 SOLUTION RESPIRATORY (INHALATION) at 07:46

## 2025-07-29 RX ADMIN — ACETYLCYSTEINE 3 ML: 200 SOLUTION ORAL; RESPIRATORY (INHALATION) at 15:28

## 2025-07-29 RX ADMIN — QUETIAPINE FUMARATE 25 MG: 25 TABLET ORAL at 20:57

## 2025-07-29 RX ADMIN — FUROSEMIDE 20 MG: 10 INJECTION, SOLUTION INTRAVENOUS at 13:50

## 2025-07-29 RX ADMIN — ACETYLCYSTEINE 3 ML: 200 SOLUTION ORAL; RESPIRATORY (INHALATION) at 19:37

## 2025-07-29 RX ADMIN — FLUTICASONE PROPIONATE 2 SPRAY: 50 SPRAY, METERED NASAL at 08:19

## 2025-07-29 NOTE — PLAN OF CARE
Goal Outcome Evaluation:  Plan of Care Reviewed With: patient        Progress: no change  Outcome Evaluation: 2LNC-4L NC while sleeping. SR on tele. PRN seroquel given to help pt rest. Pt slept between care.

## 2025-07-29 NOTE — THERAPY TREATMENT NOTE
Patient Name: Stefanie Russell  : 1945    MRN: [id number]                              Today's Date: 2025       Admit Date: 2025    Visit Dx:     ICD-10-CM ICD-9-CM   1. Acute respiratory failure with hypoxia  J96.01 518.81   2. COPD with acute exacerbation  J44.1 491.21   3. Moderate mixed hyperlipidemia not requiring statin therapy  E78.2 272.2   4. Primary hypertension  I10 401.9     Patient Active Problem List   Diagnosis    Multiple closed fractures of pelvis with stable disruption of pelvic [place]    History of pelvic fracture    Fall at home, initial encounter    Osteoporosis    Frequent falls    Head trauma    Hypertension    Hyperlipidemia    Sepsis due to group A Streptococcus    Dysphagia    Respiratory distress    Acute streptococcal pharyngitis    Bacteremia due to Streptococcus    Acute exacerbation of chronic obstructive pulmonary disease (COPD)    Acute exacerbation of chronic obstructive airways disease     Past Medical History:   Diagnosis Date    High cholesterol     History of pelvic fracture 2018    Hyperlipidemia     Hypertension     Osteoarthritis     Osteoporosis      Past Surgical History:   Procedure Laterality Date    COLON SURGERY      HYSTERECTOMY      SHOULDER SURGERY      rotator cuff (left)    TONSILLECTOMY        General Information       Row Name 25 1428          Physical Therapy Time and Intention    Document Type therapy note (daily note)  -AS     Mode of Treatment physical therapy  -AS       Row Name 25 1428          General Information    Patient Profile Reviewed yes  -AS     Existing Precautions/Restrictions fall;oxygen therapy device and L/min  3L O2  -AS     Barriers to Rehab none identified  -AS       Row Name 25 1428          Cognition    Orientation Status (Cognition) oriented x 4  -AS       Row Name 25 1428          Safety Issues/Impairments Affecting Functional Mobility    Safety Issues Affecting Function (Mobility) safety  precautions follow-through/compliance;positioning of assistive device  -AS     Impairments Affecting Function (Mobility) endurance/activity tolerance;strength;shortness of breath  -AS     Comment, Safety Issues/Impairments (Mobility) alert and following commands, needs cues for PLB  -AS               User Key  (r) = Recorded By, (t) = Taken By, (c) = Cosigned By      Initials Name Provider Type    AS Wendy Greco PTA Physical Therapist Assistant                   Mobility       Row Name 07/29/25 1428          Bed Mobility    Comment, (Bed Mobility) C pre/post tx  -AS       Row Name 07/29/25 1428          Transfers    Comment, (Transfers) cues for hand placement  -AS       Row Name 07/29/25 1428          Sit-Stand Transfer    Sit-Stand [place] (Transfers) standby assist  -AS     Assistive Device (Sit-Stand Transfers) walker, front-wheeled  -AS       Row Name 07/29/25 1428          Gait/Stairs (Locomotion)    [place] Level (Gait) verbal cues;standby assist  -AS     Assistive Device (Gait) walker, front-wheeled  -AS     Distance in Feet (Gait) 220  -AS     Deviations/Abnormal Patterns (Gait) gait speed decreased  -AS     Bilateral Gait Deviations forward flexed posture  -AS     Comment, (Gait/Stairs) patient ambulated 220' with SBA and rolling walker for support, 1 standing rest break d/t weakness and SOA, SaO2 88%-95% on 3L O2 per nc. Cues to stay close to walker and to avoid forward flexed posture.  -AS               User Key  (r) = Recorded By, (t) = Taken By, (c) = Cosigned By      Initials Name Provider Type    AS Wendy Greco PTA Physical Therapist Assistant                   Obj/Interventions       Row Name 07/29/25 1430          Motor Skills    Therapeutic Exercise knee;ankle  -AS       Row Name 07/29/25 1430          Knee (Therapeutic Exercise)    Knee (Therapeutic Exercise) strengthening exercise  -AS     Knee Strengthening (Therapeutic Exercise) bilateral;marching while seated;LAQ  (long arc quad);sitting;10 repetitions  -AS       [facility] Name 07/29/25 1430          Ankle (Therapeutic Exercise)    Ankle (Therapeutic Exercise) AROM (active range of motion)  -AS     Ankle AROM (Therapeutic Exercise) bilateral;dorsiflexion;plantarflexion;sitting;10 repetitions  -AS       [facility] Name 07/29/25 1430          Balance    Dynamic Standing Balance standby assist  -AS     Position/Device Used, Standing Balance supported;walker, front-wheeled  -AS     Comment, Balance no unsteadiness or LOB noticed  -AS               User Key  (r) = Recorded By, (t) = Taken By, (c) = Cosigned By      Initials Name Provider Type    AS Wendy Greco, GAYLE Physical Therapist Assistant                   Goals/Plan    No documentation.                  Clinical Impression       [facility] Name 07/29/25 1431          Pain    Pretreatment Pain Rating 0/10 - no pain  -AS     Posttreatment Pain Rating 0/10 - no pain  -AS       Row Name 07/29/25 1431          Plan of Care Review    Plan of Care Reviewed With patient  -AS     Progress improving  -AS     Outcome Evaluation patient ambulated 220' with SBA and rolling walker for support, 1 standing rest break d/t weakness and SOA, cues for PLB, SaO2 88%-95% on 3L O2 per nc. Cues to stay close to walker and to avoid forward flexed posture. Completed B LE exercises with cues for technique. Recommend home with assist at D/C when medically appropriate.  -AS       [facility] Name 07/29/25 1431          Vital Signs    Pre SpO2 (%) 95  -AS     O2 Delivery Pre Treatment supplemental O2  -AS     Intra SpO2 (%) 89  -AS     O2 Delivery Intra Treatment supplemental O2  -AS     Post SpO2 (%) 95  -AS     O2 Delivery Post Treatment supplemental O2  -AS     Pre Patient Position Sitting  -AS     Intra Patient Position Standing  -AS     Post Patient Position Sitting  -AS       Row Name 07/29/25 1431          Positioning and Restraints    Pre-Treatment Position sitting in chair/recliner  -AS     Post Treatment Position  chair  -AS     In Chair reclined;call light within reach;encouraged to call for assist;exit alarm on;waffle cushion;legs elevated  -AS               User Key  (r) = Recorded By, (t) = Taken By, (c) = Cosigned By      Initials Name Provider Type    AS Wendy Greco PTA Physical Therapist Assistant                   Outcome Measures       Row Name 07/29/25 1433          How much help from another person do you currently need...    Turning from your back to your side while in flat bed without using bedrails? 4  -AS     Moving from lying on back to sitting on the side of a flat bed without bedrails? 4  -AS     Moving to and from a bed to a chair (including a wheelchair)? 3  -AS     Standing up from a chair using your arms (e.g., wheelchair, bedside chair)? 4  -AS     Climbing 3-5 steps with a railing? 3  -AS     To walk in hospital room? 4  -AS     AM-PAC 6 Clicks Score (PT) 22  -AS     Highest Level of Mobility Goal Walk 25 Feet or More-7  -AS       Row Name 07/29/25 1433          Functional Assessment    Outcome Measure Options AM-PAC 6 Clicks Basic Mobility (PT)  -AS               User Key  (r) = Recorded By, (t) = Taken By, (c) = Cosigned By      Initials Name Provider Type    AS Wendy Greco PTA Physical Therapist Assistant                                 Physical Therapy Education       Title: PT OT SLP Therapies (In Progress)       Topic: Physical Therapy (In Progress)       Point: Mobility training (In Progress)       Learning Progress Summary            Patient Acceptance, E, NR by AS at 7/29/2025 1433    Acceptance, E, VU by ML at 7/28/2025 0957                      Point: Home exercise program (In Progress)       Learning Progress Summary            Patient Acceptance, E, NR by AS at 7/29/2025 1433                      Point: Body mechanics (In Progress)       Learning Progress Summary            Patient Acceptance, E, NR by AS at 7/29/2025 1433                      Point: Precautions (In  Progress)       Learning Progress Summary            Patient Acceptance, E, NR by AS at 7/29/2025 1433    Acceptance, E, VU by ML at 7/28/2025 0957                                      User Key       Initials Effective Dates Name Provider Type Discipline    AS 07/18/25 -  Wendy Greco PTA Physical Therapist Assistant PT     04/22/21 -  Precious Cruz Physical Therapist PT                  PT Recommendation and Plan     Progress: improving  Outcome Evaluation: patient ambulated 220' with SBA and rolling walker for support, 1 standing rest break d/t weakness and SOA, cues for PLB, SaO2 88%-95% on 3L O2 per nc. Cues to stay close to walker and to avoid forward flexed posture. Completed B LE exercises with cues for technique. Recommend home with assist at D/C when medically appropriate.     Time Calculation:         PT Charges       Row Name 07/29/25 1433             Time Calculation    Start Time 1400  -AS      PT Received On 07/29/25  -AS      PT Goal Re-Cert Due Date 08/07/25  -AS         Timed Charges    12194 - PT Therapeutic Exercise Minutes 8  -AS      32620 - Gait Training Minutes  16  -AS         Total Minutes    Timed Charges Total Minutes 24  -AS       Total Minutes 24  -AS                User Key  (r) = Recorded By, (t) = Taken By, (c) = Cosigned By      Initials Name Provider Type    AS Wendy Greco PTA Physical Therapist Assistant                  Therapy Charges for Today       Code Description Service Date Service Provider Modifiers Qty    97825952943 HC PT THER PROC EA 15 MIN 7/29/2025 Wendy Greco PTA GP 1    26425153901 HC GAIT TRAINING EA 15 MIN 7/29/2025 Wendy Greco PTA GP 1            PT G-Codes  Outcome Measure Options: AM-PAC 6 Clicks Basic Mobility (PT)  AM-PAC 6 Clicks Score (PT): 22       Wendy Greco PTA  7/29/2025

## 2025-07-29 NOTE — PLAN OF CARE
Goal Outcome Evaluation:  Plan of Care Reviewed With: patient        Progress: improving  Outcome Evaluation: patient ambulated 220' with SBA and rolling walker for support, 1 standing rest break d/t weakness and SOA, cues for PLB, SaO2 88%-95% on 3L O2 per nc. Cues to stay close to walker and to avoid forward flexed posture. Completed B LE exercises with cues for technique. Recommend home with assist at D/C when medically appropriate.

## 2025-07-29 NOTE — PROGRESS NOTES
[facility] Medicine Services  PROGRESS NOTE    Patient Name: Stefanie Russell  : 1945  MRN: [id number]    Date of Admission: 2025  Primary Care Physician: Eric Brunson DO    Subjective   Subjective     CC:  F/u respiratory failure    HPI:  Patient sitting up in bedside chair. States she is feeling better, has been up walking around her room.      Objective   Objective     Vital Signs:   Temp:  [97.4 °F (36.3 °C)-98.5 °F (36.9 °C)] 97.6 °F (36.4 °C)  Heart Rate:  [74-95] 78  Resp:  [16-18] 16  BP: (111-148)/(58-85) 130/64  Flow (L/min) (Oxygen Therapy):  [2-4] 3     Physical Exam:  Constitutional: No acute distress, awake, alert  HENT: NCAT, mucous membranes moist  Respiratory: wheezing has improved, clear bilaterally with blunted in bilateral bases, on 3L  Cardiovascular: RRR, no murmurs, rubs, or gallops  Gastrointestinal: soft, nontender, nondistended  Musculoskeletal: No bilateral ankle edema  Psychiatric: Appropriate affect, cooperative  Neurologic: Oriented x 3, speech clear, no focal deficits  Skin: No rashes      Results Reviewed:  LAB RESULTS:      Lab 2545 25   WBC 15.03*  --  11.06*   HEMOGLOBIN 14.3  --  15.9   HEMATOCRIT 42.8  --  49.2*   PLATELETS 251  --  301   NEUTROS ABS 12.62*  --  6.18   IMMATURE GRANS (ABS) 0.10*  --  0.05   LYMPHS ABS 0.97  --  3.02   MONOS ABS 1.30*  --  1.30*   EOS ABS 0.01  --  0.39   MCV 91.8  --  92.8   PROCALCITONIN  --  0.06  --    HSTROP T  --  11 14*         Lab 254 25  0545 25   SODIUM 137  --  140   POTASSIUM 4.3  --  3.9   CHLORIDE 102  --  106   CO2 20.1*  --  21.8*   ANION GAP 14.9  --  12.2   BUN 26.8*  --  23.0   CREATININE 1.09*  --  1.31*   EGFR 52.1*  --  41.8*   GLUCOSE 162*  --  116*   CALCIUM 8.3*  --  8.9   MAGNESIUM 2.2  --   --    PHOSPHORUS 3.6  --   --    TSH  --  1.050  --          Lab 25  0433   TOTAL PROTEIN 7.2   ALBUMIN 3.9    GLOBULIN 3.3   ALT (SGPT) 6   AST (SGOT) 17   BILIRUBIN 0.5   ALK PHOS 80         Lab 07/27/25  0545 07/27/25  0433   PROBNP  --  432.0   HSTROP T 11 14*                 Lab 07/27/25  0449   PH, ARTERIAL 7.440   PCO2, ARTERIAL 33.2*   PO2 ART 70.6*   FIO2 21   HCO3 ART 22.5   BASE EXCESS ART -0.8*   CARBOXYHEMOGLOBIN 1.3     Brief Urine Lab Results  (Last result in the past 365 days)        Color   Clarity   Blood   Leuk Est   Nitrite   Protein   CREAT   Urine HCG        07/27/25 1614 Yellow   Clear   Negative   Negative   Negative   Negative                   Microbiology Results Abnormal       None            CT Chest Without Contrast Diagnostic  Result Date: 7/29/2025  CT CHEST WO CONTRAST DIAGNOSTIC Date of Exam: 7/29/2025 8:29 AM EDT Indication: worsening hypoxia, elevated WBC. Comparison: 7/27/2025 Technique: Axial CT images were obtained of the chest without contrast administration.  Reconstructed coronal and sagittal images were also obtained. Automated exposure control and iterative construction methods were used. Findings: There is near complete right and left lower lobar consolidation and atelectasis with volume loss. The upper lobes are relatively free of disease, as is the right middle lobe. The thyroid is normal. No adenopathy is identified. Mild dilation of the ascending aorta measuring 4.2 cm. Moderate coronary artery calcifications. Esophagus is unremarkable. There are small bilateral pleural effusions. No adenopathy is identified. Limited evaluation of the upper abdomen is unremarkable. No aggressive appearing lytic or sclerotic bone lesions are identified. Multiple kyphoplasty changes are identified.     Impression: Impression: 1.Near complete right and left lower lobar consolidation and atelectasis with volume loss. No definite evidence of pneumonia, however cannot exclude superimposed pneumonia. 2.Small bilateral pleural effusions. 3.Mild dilation of the ascending aorta measuring 4.2 cm.  Electronically Signed: Miguel Lewis MD  7/29/2025 9:06 AM EDT  Workstation ID: [id number]      Results for orders placed during the hospital encounter of 07/27/25    Adult Transthoracic Echo Complete W/ Cont if Necessary Per Protocol 07/27/2025  1:42 PM    Interpretation Summary    Left ventricular systolic function is normal. Left ventricular ejection fraction appears to be 66 - 70%.    Left ventricular wall thickness is consistent with hypertrophy. Sigmoid-shaped ventricular septum is present.    Left ventricular diastolic function is consistent with (grade I) impaired relaxation.    Trace aortic regurgitation.    Trace mitral regurgitation.    Left ventricular intracavitary gradient is noted noted any evidence of outflow tract gradient.      Current medications:  Scheduled Meds:amLODIPine, 5 mg, Oral, Q24H  atenolol, 25 mg, Oral, Daily  busPIRone, 10 mg, Oral, Q8H  cefTRIAXone, 1,000 mg, Intravenous, Q24H  citalopram, 20 mg, Oral, Daily  doxycycline, 100 mg, Oral, Q12H  DULoxetine, 60 mg, Oral, Daily  enoxaparin sodium, 40 mg, Subcutaneous, Q24H  fluticasone, 2 spray, Nasal, Daily  ipratropium-albuterol, 3 mL, Nebulization, Q4H - RT  lisinopril, 40 mg, Oral, Q24H  predniSONE, 40 mg, Oral, Daily With Breakfast  sodium chloride, 10 mL, Intravenous, Q12H      Continuous Infusions:   PRN Meds:.  acetaminophen **OR** acetaminophen **OR** acetaminophen    senna-docusate sodium **AND** polyethylene glycol **AND** bisacodyl **AND** bisacodyl    Calcium Replacement - Follow Nurse / BPA Driven Protocol    Magnesium Standard Dose Replacement - Follow Nurse / BPA Driven Protocol    melatonin    ondansetron    Phosphorus Replacement - Follow Nurse / BPA Driven Protocol    Potassium Replacement - Follow Nurse / BPA Driven Protocol    QUEtiapine    sodium chloride    sodium chloride    sodium chloride    Assessment & Plan   Assessment & Plan     Active Hospital Problems    Diagnosis  POA    **Acute exacerbation of chronic  obstructive pulmonary disease (COPD) [J44.1]  Yes    Hypertension [I10]  Yes    Hyperlipidemia [E78.5]  Yes    Dysphagia [R13.10]  Yes    Frequent falls [R29.6]  Not Applicable      Resolved Hospital Problems   No resolved problems to display.        Brief Hospital Course to date:  Stefanie Russell is a 80 y.o. female with PMHx significant for HTN, HLD, COPD, anxiety/depression, GERD, CKDII, osteoporosis who presents with increasing SOA.    Acute Respiratory Failure w/Hypoxia  AECOPD  Bilateral Lower Lobe Atelectasis vs. PNA  - CXR without acute fidings, small effusions, no pneumonia. CT chest completed this morning showing near complete right and left lower lobar consolidation and atelectasis with volume loss, unable to exclude pneumonia  - on no baseline O2, currently on 3L  - schedule duo-nebs,, add incentive spirometry and OPEP  - empiric doxy x 5 days, mucinex. Add CTX given imaging findings and increasing WBC  - short steroid burst, prednisone x 5 days  - Echo showing normal EF 66-70% and in setting of normal BNP doubt significant volume overload contributing, dose diuretics PRN. Will give additional dose of lasix today.    HTN  - continue atenolol, lisinopril    HLD:  - continue statin    CKDII:  - creatinine at baseline    Anxiety/Depression:  - continue home regimen    Expected Discharge Location and Transportation: home  Expected Discharge   Expected discharge date/ time has not been documented.     VTE Prophylaxis:  Pharmacologic & mechanical VTE prophylaxis orders are present.         AM-PAC 6 Clicks Score (PT): 21 (07/28/25 0957)    CODE STATUS:   Code Status and Medical Interventions: CPR (Attempt to Resuscitate); Full Support   Ordered at: 07/27/25 0649     Code Status (Patient has no pulse and is not breathing):    CPR (Attempt to Resuscitate)     Medical Interventions (Patient has pulse or is breathing):    Full Support       Dasha Spangler,   07/29/25

## 2025-07-29 NOTE — PLAN OF CARE
Goal Outcome Evaluation:              Outcome Evaluation: Pt on 3L NC, NSR on tele, VSS. No complaints of pain, pt states being SOA only with activity. Alicja cute changes this shift, will continue with POC.

## 2025-07-30 LAB
ANION GAP SERPL CALCULATED.3IONS-SCNC: 11.5 MMOL/L (ref 5–15)
BASOPHILS # BLD AUTO: 0.02 10*3/MM3 (ref 0–0.2)
BASOPHILS NFR BLD AUTO: 0.2 % (ref 0–1.5)
BUN SERPL-MCNC: 29.3 MG/DL (ref 8–23)
BUN/CREAT SERPL: 31.2 (ref 7–25)
CALCIUM SPEC-SCNC: 8 MG/DL (ref 8.6–10.5)
CHLORIDE SERPL-SCNC: 104 MMOL/L (ref 98–107)
CO2 SERPL-SCNC: 21.5 MMOL/L (ref 22–29)
CREAT SERPL-MCNC: 0.94 MG/DL (ref 0.57–1)
DEPRECATED RDW RBC AUTO: 51.8 FL (ref 37–54)
EGFRCR SERPLBLD CKD-EPI 2021: 62.2 ML/MIN/1.73
EOSINOPHIL # BLD AUTO: 0.08 10*3/MM3 (ref 0–0.4)
EOSINOPHIL NFR BLD AUTO: 0.8 % (ref 0.3–6.2)
ERYTHROCYTE [DISTWIDTH] IN BLOOD BY AUTOMATED COUNT: 14.7 % (ref 12.3–15.4)
GLUCOSE SERPL-MCNC: 88 MG/DL (ref 65–99)
HCT VFR BLD AUTO: 39.7 % (ref 34–46.6)
HGB BLD-MCNC: 12.6 G/DL (ref 12–15.9)
IMM GRANULOCYTES # BLD AUTO: 0.04 10*3/MM3 (ref 0–0.05)
IMM GRANULOCYTES NFR BLD AUTO: 0.4 % (ref 0–0.5)
LYMPHOCYTES # BLD AUTO: 1.71 10*3/MM3 (ref 0.7–3.1)
LYMPHOCYTES NFR BLD AUTO: 17.3 % (ref 19.6–45.3)
MCH RBC QN AUTO: 30.3 PG (ref 26.6–33)
MCHC RBC AUTO-ENTMCNC: 31.7 G/DL (ref 31.5–35.7)
MCV RBC AUTO: 95.4 FL (ref 79–97)
MONOCYTES # BLD AUTO: 1.4 10*3/MM3 (ref 0.1–0.9)
MONOCYTES NFR BLD AUTO: 14.1 % (ref 5–12)
NEUTROPHILS NFR BLD AUTO: 6.66 10*3/MM3 (ref 1.7–7)
NEUTROPHILS NFR BLD AUTO: 67.2 % (ref 42.7–76)
NRBC BLD AUTO-RTO: 0 /100 WBC (ref 0–0.2)
PLATELET # BLD AUTO: 199 10*3/MM3 (ref 140–450)
PMV BLD AUTO: 11.1 FL (ref 6–12)
POTASSIUM SERPL-SCNC: 3.9 MMOL/L (ref 3.5–5.2)
RBC # BLD AUTO: 4.16 10*6/MM3 (ref 3.77–5.28)
SODIUM SERPL-SCNC: 137 MMOL/L (ref 136–145)
WBC NRBC COR # BLD AUTO: 9.91 10*3/MM3 (ref 3.4–10.8)

## 2025-07-30 PROCEDURE — 25010000002 CEFTRIAXONE PER 250 MG: Performed by: INTERNAL MEDICINE

## 2025-07-30 PROCEDURE — 80048 BASIC METABOLIC PNL TOTAL CA: CPT | Performed by: INTERNAL MEDICINE

## 2025-07-30 PROCEDURE — 94761 N-INVAS EAR/PLS OXIMETRY MLT: CPT

## 2025-07-30 PROCEDURE — 94664 DEMO&/EVAL PT USE INHALER: CPT

## 2025-07-30 PROCEDURE — 94799 UNLISTED PULMONARY SVC/PX: CPT

## 2025-07-30 PROCEDURE — 63710000001 PREDNISONE PER 1 MG: Performed by: INTERNAL MEDICINE

## 2025-07-30 PROCEDURE — 25010000002 ENOXAPARIN PER 10 MG: Performed by: INTERNAL MEDICINE

## 2025-07-30 PROCEDURE — 85025 COMPLETE CBC W/AUTO DIFF WBC: CPT | Performed by: INTERNAL MEDICINE

## 2025-07-30 PROCEDURE — 99232 SBSQ HOSP IP/OBS MODERATE 35: CPT | Performed by: INTERNAL MEDICINE

## 2025-07-30 RX ADMIN — QUETIAPINE FUMARATE 25 MG: 25 TABLET ORAL at 20:33

## 2025-07-30 RX ADMIN — BUSPIRONE HYDROCHLORIDE 10 MG: 10 TABLET ORAL at 14:52

## 2025-07-30 RX ADMIN — AMLODIPINE BESYLATE 5 MG: 5 TABLET ORAL at 09:37

## 2025-07-30 RX ADMIN — ATENOLOL 25 MG: 25 TABLET ORAL at 09:37

## 2025-07-30 RX ADMIN — IPRATROPIUM BROMIDE AND ALBUTEROL SULFATE 3 ML: 2.5; .5 SOLUTION RESPIRATORY (INHALATION) at 19:42

## 2025-07-30 RX ADMIN — ACETYLCYSTEINE 3 ML: 200 SOLUTION ORAL; RESPIRATORY (INHALATION) at 07:52

## 2025-07-30 RX ADMIN — LISINOPRIL 40 MG: 40 TABLET ORAL at 09:37

## 2025-07-30 RX ADMIN — Medication 10 ML: at 09:38

## 2025-07-30 RX ADMIN — ENOXAPARIN SODIUM 40 MG: 100 INJECTION SUBCUTANEOUS at 09:37

## 2025-07-30 RX ADMIN — CEFTRIAXONE SODIUM 1000 MG: 1 INJECTION, POWDER, FOR SOLUTION INTRAMUSCULAR; INTRAVENOUS at 14:52

## 2025-07-30 RX ADMIN — ACETYLCYSTEINE 3 ML: 200 SOLUTION ORAL; RESPIRATORY (INHALATION) at 19:42

## 2025-07-30 RX ADMIN — DOXYCYCLINE 100 MG: 100 CAPSULE ORAL at 20:32

## 2025-07-30 RX ADMIN — BUSPIRONE HYDROCHLORIDE 10 MG: 10 TABLET ORAL at 20:32

## 2025-07-30 RX ADMIN — CITALOPRAM HYDROBROMIDE 20 MG: 20 TABLET ORAL at 09:37

## 2025-07-30 RX ADMIN — GUAIFENESIN 1200 MG: 600 TABLET, EXTENDED RELEASE ORAL at 09:37

## 2025-07-30 RX ADMIN — DULOXETINE 60 MG: 60 CAPSULE, DELAYED RELEASE ORAL at 09:37

## 2025-07-30 RX ADMIN — GUAIFENESIN 1200 MG: 600 TABLET, EXTENDED RELEASE ORAL at 20:32

## 2025-07-30 RX ADMIN — IPRATROPIUM BROMIDE AND ALBUTEROL SULFATE 3 ML: 2.5; .5 SOLUTION RESPIRATORY (INHALATION) at 07:52

## 2025-07-30 RX ADMIN — DOXYCYCLINE 100 MG: 100 CAPSULE ORAL at 09:37

## 2025-07-30 RX ADMIN — ACETAMINOPHEN 650 MG: 325 TABLET ORAL at 20:32

## 2025-07-30 RX ADMIN — BUSPIRONE HYDROCHLORIDE 10 MG: 10 TABLET ORAL at 06:21

## 2025-07-30 RX ADMIN — IPRATROPIUM BROMIDE AND ALBUTEROL SULFATE 3 ML: 2.5; .5 SOLUTION RESPIRATORY (INHALATION) at 11:26

## 2025-07-30 RX ADMIN — PREDNISONE 40 MG: 20 TABLET ORAL at 09:37

## 2025-07-30 NOTE — PLAN OF CARE
Goal Outcome Evaluation:              Outcome Evaluation: Pt on RA-2L NC, NSR on tele, VSS. No complaints of pain, no SOA this shift. Will continue with POC.

## 2025-07-30 NOTE — PLAN OF CARE
Goal Outcome Evaluation:            A&Ox4, VSS, Requested to walk hallway overnight prior to bed-two laps around unit completed w/o difficulty, 5LNC w/humidifier, seroquel given per pt request. Uneventful night. Pt has high hopes to be transitioned to home soon.

## 2025-07-30 NOTE — CASE MANAGEMENT/SOCIAL WORK
Continued Stay Note  [facility]     Patient Name: Stefanie Russell  MRN: [id number]  Today's Date: 7/30/2025    Admit Date: 7/27/2025    Plan: Home   Discharge Plan       Row Name 07/30/25 1136       Plan    Plan Home    Patient/Family in Agreement with Plan yes    Plan Comments CM spoke with patient at bedside regarding DC planning. Patient states she feels alot better today. DCP is to return home when she is medically ready. Patient denies any DCP needs at this time. CM following.    Final Discharge Disposition Code 01 - home or self-care                   Discharge Codes    No documentation.                       Gaby Clemens RN     Progress Note - Infectious Disease   Lisa Nails 63 y o  female MRN: [id number]  Unit/Bed#: [facility] 912-01 Encounter: [id number]      Impression/Recommendations:  1  Left facial cellulitis, likely odontogenic in etiology per CT findings     Patient is status post tooth extraction   Operative culture grew mixed oral nirmala, not helpful    Patient is clinically much improved  Continue p o  clindamycin/cefdinir  Treat x10 days postop, through 4/      2  Tooth abscess, status post tooth extraction  OMFS follow-up      3  Chronic parotic duct dysfunction      4  Multiple antibiotic allergies   Patient is tolerating clindamycin well   She is tolerating cefdinir again  Monitor for cross allergic reaction      Discussed with patient in detail regarding the above plan  Discussed with  Dr Joceline Zee from [facility] service earlier  Okay for discharge from ID viewpoint        Antibiotics:  Clindamycin/cefdinir  POD # 3     Subjective:  Patient's left jaw pain and swelling continue to improve  Temperature stays down   No chills  She is tolerating antibiotic well   No nausea, vomiting or diarrhea      Objective:  Vitals:  Temp:  [96 8 °F (36 °C)-97 3 °F (36 3 °C)] 96 8 °F (36 °C)  HR:  [63-72] 63  Resp:  [18-20] 18  BP: (104-120)/(61-81) 120/68  SpO2:  [95 %-98 %] 95 %  Temp (24hrs), Av 1 °F (36 2 °C), Min:96 8 °F (36 °C), Max:97 3 °F (36 3 °C)  Current: Temperature: (!) 96 8 °F (36 °C)    Physical Exam:     General: Awake, alert, cooperative, no distress  Face:  Improving left face induration and warmth  No further erythema  Improving tenderness  No fluctuance  Neck:  Supple  No mass  Mild left-sided cervical lymphadenopathy  Lungs: Expansion symmetric, no rales, no wheezing, respirations unlabored  Heart:  Regular rate and rhythm, S1 and S2 normal, no murmur  Abdomen: Soft, nondistended, non-tender, bowel sounds active all four quadrants, no masses, no organomegaly  Extremities: No edema   No erythema/warmth  No ulcer  Nontender to palpation  Skin:  No rash  Neuro: Moves all extremities  Invasive Devices     Peripheral Intravenous Line  Duration           Peripheral IV 03/24/23 Distal;Dorsal (posterior); Right Forearm 2 days                Labs studies:   I have personally reviewed pertinent labs  Results from last 7 days   Lab Units 03/24/23  0205 03/23/23  0614 03/22/23  1829   POTASSIUM mmol/L 4 0 3 9 3 7   CHLORIDE mmol/L 106 107 108   CO2 mmol/L 31 29 27   BUN mg/dL 9 6 7   CREATININE mg/dL 0 60 0 63 0 71   EGFR ml/min/1 73sq m 100 98 93   CALCIUM mg/dL 9 0 9 0 9 3   AST U/L  --   --  19   ALT U/L  --   --  22   ALK PHOS U/L  --   --  118*     Results from last 7 days   Lab Units 03/24/23  0205 03/23/23  0614 03/22/23  1829   WBC Thousand/uL 13 73* 9 70 12 14*   HEMOGLOBIN g/dL 12 4 12 8 14 3   PLATELETS Thousands/uL 155 147* 175     Results from last 7 days   Lab Units 03/23/23  1523   GRAM STAIN RESULT  No Polys*  3+ Gram negative rods*  2+ Gram positive cocci in pairs and chains*       Imaging Studies:   I have personally reviewed pertinent imaging study reports and images in PACS  EKG, Pathology, and Other Studies:   I have personally reviewed pertinent reports

## 2025-07-30 NOTE — PROGRESS NOTES
[facility] Medicine Services  PROGRESS NOTE    Patient Name: Stefanie Russell  : 1945  MRN: [id number]    Date of Admission: 2025  Primary Care Physician: Eric Brunson DO    Subjective   Subjective     CC:  F/u respiratory failure    HPI:  Patient sitting up in bedside chair, had a really good day yesterday, walked a lot, breathing is significantly better.      Objective   Objective     Vital Signs:   Temp:  [97.6 °F (36.4 °C)-98.7 °F (37.1 °C)] 97.7 °F (36.5 °C)  Heart Rate:  [73-96] 73  Resp:  [16-18] 18  BP: (123-167)/(63-79) 167/79  Flow (L/min) (Oxygen Therapy):  [1-3] 1     Physical Exam:  Constitutional: No acute distress, awake, alert  HENT: NCAT, mucous membranes moist  Respiratory: no wheezing, improved air movement, clear bilaterally,on 1L  Cardiovascular: RRR, no murmurs, rubs, or gallops  Gastrointestinal: soft, nontender, nondistended  Musculoskeletal: No bilateral ankle edema  Psychiatric: Appropriate affect, cooperative  Neurologic: Oriented x 3, speech clear, no focal deficits  Skin: No rashes      Results Reviewed:  LAB RESULTS:      Lab 2545 25   WBC 9.91 15.03*  --  11.06*   HEMOGLOBIN 12.6 14.3  --  15.9   HEMATOCRIT 39.7 42.8  --  49.2*   PLATELETS 199 251  --  301   NEUTROS ABS 6.66 12.62*  --  6.18   IMMATURE GRANS (ABS) 0.04 0.10*  --  0.05   LYMPHS ABS 1.71 0.97  --  3.02   MONOS ABS 1.40* 1.30*  --  1.30*   EOS ABS 0.08 0.01  --  0.39   MCV 95.4 91.8  --  92.8   PROCALCITONIN  --   --  0.06  --    HSTROP T  --   --  11 14*         Lab 25  0545 25  0433   SODIUM 137 137  --  140   POTASSIUM 3.9 4.3  --  3.9   CHLORIDE 104 102  --  106   CO2 21.5* 20.1*  --  21.8*   ANION GAP 11.5 14.9  --  12.2   BUN 29.3* 26.8*  --  23.0   CREATININE 0.94 1.09*  --  1.31*   EGFR 62.2 52.1*  --  41.8*   GLUCOSE 88 162*  --  116*   CALCIUM 8.0* 8.3*  --  8.9   MAGNESIUM   --  2.2  --   --    PHOSPHORUS  --  3.6  --   --    TSH  --   --  1.050  --          Lab 07/27/25  0433   TOTAL PROTEIN 7.2   ALBUMIN 3.9   GLOBULIN 3.3   ALT (SGPT) 6   AST (SGOT) 17   BILIRUBIN 0.5   ALK PHOS 80         Lab 07/27/25  0545 07/27/25  0433   PROBNP  --  432.0   HSTROP T 11 14*                 Lab 07/27/25  0449   PH, ARTERIAL 7.440   PCO2, ARTERIAL 33.2*   PO2 ART 70.6*   FIO2 21   HCO3 ART 22.5   BASE EXCESS ART -0.8*   CARBOXYHEMOGLOBIN 1.3     Brief Urine Lab Results  (Last result in the past 365 days)        Color   Clarity   Blood   Leuk Est   Nitrite   Protein   CREAT   Urine HCG        07/27/25 1614 Yellow   Clear   Negative   Negative   Negative   Negative                   Microbiology Results Abnormal       None            CT Chest Without Contrast Diagnostic  Result Date: 7/29/2025  CT CHEST WO CONTRAST DIAGNOSTIC Date of Exam: 7/29/2025 8:29 AM EDT Indication: worsening hypoxia, elevated WBC. Comparison: 7/27/2025 Technique: Axial CT images were obtained of the chest without contrast administration.  Reconstructed coronal and sagittal images were also obtained. Automated exposure control and iterative construction methods were used. Findings: There is near complete right and left lower lobar consolidation and atelectasis with volume loss. The upper lobes are relatively free of disease, as is the right middle lobe. The thyroid is normal. No adenopathy is identified. Mild dilation of the ascending aorta measuring 4.2 cm. Moderate coronary artery calcifications. Esophagus is unremarkable. There are small bilateral pleural effusions. No adenopathy is identified. Limited evaluation of the upper abdomen is unremarkable. No aggressive appearing lytic or sclerotic bone lesions are identified. Multiple kyphoplasty changes are identified.     Impression: Impression: 1.Near complete right and left lower lobar consolidation and atelectasis with volume loss. No definite evidence of pneumonia, however  cannot exclude superimposed pneumonia. 2.Small bilateral pleural effusions. 3.Mild dilation of the ascending aorta measuring 4.2 cm. Electronically Signed: Miguel Lewis MD  7/29/2025 9:06 AM EDT  Workstation ID: [id number]      Results for orders placed during the hospital encounter of 07/27/25    Adult Transthoracic Echo Complete W/ Cont if Necessary Per Protocol 07/27/2025  1:42 PM    Interpretation Summary    Left ventricular systolic function is normal. Left ventricular ejection fraction appears to be 66 - 70%.    Left ventricular wall thickness is consistent with hypertrophy. Sigmoid-shaped ventricular septum is present.    Left ventricular diastolic function is consistent with (grade I) impaired relaxation.    Trace aortic regurgitation.    Trace mitral regurgitation.    Left ventricular intracavitary gradient is noted noted any evidence of outflow tract gradient.      Current medications:  Scheduled Meds:acetylcysteine, 3 mL, Nebulization, TID - RT  amLODIPine, 5 mg, Oral, Q24H  atenolol, 25 mg, Oral, Daily  busPIRone, 10 mg, Oral, Q8H  cefTRIAXone, 1,000 mg, Intravenous, Q24H  citalopram, 20 mg, Oral, Daily  doxycycline, 100 mg, Oral, Q12H  DULoxetine, 60 mg, Oral, Daily  enoxaparin sodium, 40 mg, Subcutaneous, Q24H  fluticasone, 2 spray, Nasal, Daily  guaiFENesin, 1,200 mg, Oral, Q12H  ipratropium-albuterol, 3 mL, Nebulization, Q4H - RT  lisinopril, 40 mg, Oral, Q24H  predniSONE, 40 mg, Oral, Daily With Breakfast  sodium chloride, 10 mL, Intravenous, Q12H      Continuous Infusions:   PRN Meds:.  acetaminophen **OR** acetaminophen **OR** acetaminophen    senna-docusate sodium **AND** polyethylene glycol **AND** bisacodyl **AND** bisacodyl    Calcium Replacement - Follow Nurse / BPA Driven Protocol    Magnesium Standard Dose Replacement - Follow Nurse / BPA Driven Protocol    melatonin    ondansetron    Phosphorus Replacement - Follow Nurse / BPA Driven Protocol    Potassium Replacement - Follow Nurse / BPA  Driven Protocol    QUEtiapine    sodium chloride    sodium chloride    sodium chloride    Assessment & Plan   Assessment & Plan     Active Hospital Problems    Diagnosis  POA    **Acute exacerbation of chronic obstructive pulmonary disease (COPD) [J44.1]  Yes    Acute exacerbation of chronic obstructive airways disease [J44.1]  Yes    Hypertension [I10]  Yes    Hyperlipidemia [E78.5]  Yes    Dysphagia [R13.10]  Yes    Frequent falls [R29.6]  Not Applicable      Resolved Hospital Problems   No resolved problems to display.        Brief Hospital Course to date:  Stefanie Russell is a 80 y.o. female with PMHx significant for HTN, HLD, COPD, anxiety/depression, GERD, CKDII, osteoporosis who presents with increasing SOA.    Acute Respiratory Failure w/Hypoxia - improving  AECOPD  Bilateral Lower Lobe Atelectasis vs. PNA  - CXR without acute fidings, small effusions, no pneumonia. CT chest showing near complete right and left lower lobar consolidation and atelectasis with volume loss, unable to exclude pneumonia  - on no baseline O2, improved this morning and able to wean to 1L/RA  - schedule duo-nebs, continue incentive spirometry and OPEP. Continue mucomyst  - empiric doxy x 5 days, mucinex. Added CTX 7/29 given imaging findings and increasing WBC  - short steroid burst, prednisone x 5 days  - Echo showing normal EF 66-70% and in setting of normal BNP doubt significant volume overload contributing, dose diuretics PRN    HTN  - continue atenolol, lisinopril    HLD:  - continue statin    CKDII:  - creatinine at baseline    Anxiety/Depression:  - continue home regimen    Expected Discharge Location and Transportation: home  Expected Discharge   Expected discharge date/ time has not been documented.     VTE Prophylaxis:  Pharmacologic & mechanical VTE prophylaxis orders are present.         AM-PAC 6 Clicks Score (PT): 22 (07/29/25 0743)    CODE STATUS:   Code Status and Medical Interventions: CPR (Attempt to Resuscitate);  Full Support   Ordered at: 07/27/25 0649     Code Status (Patient has no pulse and is not breathing):    CPR (Attempt to Resuscitate)     Medical Interventions (Patient has pulse or is breathing):    Full Support       Dasha Spangler,   07/30/25

## 2025-07-31 ENCOUNTER — READMISSION MANAGEMENT (OUTPATIENT)
Dept: CALL CENTER | Facility: HOSPITAL | Age: 80
End: 2025-07-31
Payer: MEDICARE

## 2025-07-31 VITALS
DIASTOLIC BLOOD PRESSURE: 87 MMHG | HEART RATE: 79 BPM | RESPIRATION RATE: 18 BRPM | TEMPERATURE: 97.9 F | OXYGEN SATURATION: 89 % | HEIGHT: 57 IN | BODY MASS INDEX: 34.3 KG/M2 | SYSTOLIC BLOOD PRESSURE: 167 MMHG | WEIGHT: 159 LBS

## 2025-07-31 PROCEDURE — 94664 DEMO&/EVAL PT USE INHALER: CPT

## 2025-07-31 PROCEDURE — 99239 HOSP IP/OBS DSCHRG MGMT >30: CPT | Performed by: INTERNAL MEDICINE

## 2025-07-31 PROCEDURE — 94761 N-INVAS EAR/PLS OXIMETRY MLT: CPT

## 2025-07-31 PROCEDURE — 63710000001 PREDNISONE PER 1 MG: Performed by: INTERNAL MEDICINE

## 2025-07-31 PROCEDURE — 94799 UNLISTED PULMONARY SVC/PX: CPT

## 2025-07-31 PROCEDURE — 25010000002 ENOXAPARIN PER 10 MG: Performed by: INTERNAL MEDICINE

## 2025-07-31 RX ORDER — ALBUTEROL SULFATE 90 UG/1
2 INHALANT RESPIRATORY (INHALATION) EVERY 4 HOURS PRN
Qty: 8.5 G | Refills: 0 | Status: SHIPPED | OUTPATIENT
Start: 2025-07-31

## 2025-07-31 RX ORDER — DOXYCYCLINE 100 MG/1
100 CAPSULE ORAL EVERY 12 HOURS SCHEDULED
Qty: 3 CAPSULE | Refills: 0 | Status: SHIPPED | OUTPATIENT
Start: 2025-07-31 | End: 2025-07-31

## 2025-07-31 RX ORDER — CEFDINIR 300 MG/1
300 CAPSULE ORAL 2 TIMES DAILY
Qty: 4 CAPSULE | Refills: 0 | Status: SHIPPED | OUTPATIENT
Start: 2025-07-31 | End: 2025-08-02

## 2025-07-31 RX ORDER — CEFDINIR 300 MG/1
300 CAPSULE ORAL 2 TIMES DAILY
Qty: 4 CAPSULE | Refills: 0 | Status: SHIPPED | OUTPATIENT
Start: 2025-07-31 | End: 2025-07-31

## 2025-07-31 RX ORDER — ALBUTEROL SULFATE 90 UG/1
2 INHALANT RESPIRATORY (INHALATION) EVERY 4 HOURS PRN
Qty: 8.5 G | Refills: 0 | Status: SHIPPED | OUTPATIENT
Start: 2025-07-31 | End: 2025-07-31

## 2025-07-31 RX ORDER — GUAIFENESIN 600 MG/1
1200 TABLET, EXTENDED RELEASE ORAL 2 TIMES DAILY
Qty: 12 TABLET | Refills: 0 | Status: SHIPPED | OUTPATIENT
Start: 2025-07-31 | End: 2025-08-03

## 2025-07-31 RX ORDER — DOXYCYCLINE 100 MG/1
100 CAPSULE ORAL EVERY 12 HOURS SCHEDULED
Qty: 3 CAPSULE | Refills: 0 | Status: SHIPPED | OUTPATIENT
Start: 2025-07-31 | End: 2025-08-02

## 2025-07-31 RX ORDER — PREDNISONE 20 MG/1
TABLET ORAL
Qty: 5 TABLET | Refills: 0 | Status: SHIPPED | OUTPATIENT
Start: 2025-08-01 | End: 2025-08-06

## 2025-07-31 RX ORDER — PREDNISONE 20 MG/1
TABLET ORAL
Qty: 5 TABLET | Refills: 0 | Status: SHIPPED | OUTPATIENT
Start: 2025-08-01 | End: 2025-07-31

## 2025-07-31 RX ORDER — GUAIFENESIN 600 MG/1
1200 TABLET, EXTENDED RELEASE ORAL 2 TIMES DAILY
Qty: 12 TABLET | Refills: 0 | Status: SHIPPED | OUTPATIENT
Start: 2025-07-31 | End: 2025-07-31

## 2025-07-31 RX ADMIN — DULOXETINE 60 MG: 60 CAPSULE, DELAYED RELEASE ORAL at 09:42

## 2025-07-31 RX ADMIN — GUAIFENESIN 1200 MG: 600 TABLET, EXTENDED RELEASE ORAL at 09:42

## 2025-07-31 RX ADMIN — BUSPIRONE HYDROCHLORIDE 10 MG: 10 TABLET ORAL at 09:42

## 2025-07-31 RX ADMIN — DOXYCYCLINE 100 MG: 100 CAPSULE ORAL at 09:42

## 2025-07-31 RX ADMIN — LISINOPRIL 40 MG: 40 TABLET ORAL at 09:41

## 2025-07-31 RX ADMIN — CITALOPRAM HYDROBROMIDE 20 MG: 20 TABLET ORAL at 09:42

## 2025-07-31 RX ADMIN — AMLODIPINE BESYLATE 5 MG: 5 TABLET ORAL at 09:42

## 2025-07-31 RX ADMIN — ACETYLCYSTEINE 3 ML: 200 SOLUTION ORAL; RESPIRATORY (INHALATION) at 07:02

## 2025-07-31 RX ADMIN — ATENOLOL 25 MG: 25 TABLET ORAL at 09:42

## 2025-07-31 RX ADMIN — Medication 10 ML: at 09:43

## 2025-07-31 RX ADMIN — ENOXAPARIN SODIUM 40 MG: 100 INJECTION SUBCUTANEOUS at 09:42

## 2025-07-31 RX ADMIN — IPRATROPIUM BROMIDE AND ALBUTEROL SULFATE 3 ML: 2.5; .5 SOLUTION RESPIRATORY (INHALATION) at 07:03

## 2025-07-31 RX ADMIN — PREDNISONE 40 MG: 20 TABLET ORAL at 09:41

## 2025-07-31 NOTE — OUTREACH NOTE
Prep Survey      Flowsheet Row Responses   [facility] facility patient discharged from? [facility]   Is LACE score < 7 ? No   Eligibility Readm Mgmt   Discharge diagnosis Acute exacerbation of chronic obstructive pulmonary disease (COPD) (J44.1)   Does the patient have one of the following disease processes/diagnoses(primary or secondary)? COPD   Does the patient have Home health ordered? No   Is there a DME ordered? Yes   What DME was ordered? SALTY [facility]ORVATH   Prep survey completed? Yes            DEVIN CANNON - Registered Nurse

## 2025-07-31 NOTE — CASE MANAGEMENT/SOCIAL WORK
Case Management Discharge Note      Final Note: I spoke with Ms Russell at bedside as she is up for discharge.  Her only need is oxygen prior to discharge, and I have given the referral to [facility].  I have also called down for a portable pulse ox to be delivered to [facility], and have advised her nurse that one will be tubed up to [facility]. There are no other discharge needs at this time.  Ms Russell's daughter is at bedside to transport her home.         Selected Continued Care - Admitted Since 7/27/2025       Destination    No services have been selected for the patient.                Durable Medical Equipment       Service Provider Services Address Phone Fax Patient Preferred    [facility] [address], [place] [id number] [phone] --              Dialysis/Infusion    No services have been selected for the patient.                Home Medical Care    No services have been selected for the patient.                Therapy    No services have been selected for the patient.                Community Resources    No services have been selected for the patient.                Community & DME    No services have been selected for the patient.                         Final Discharge Disposition Code: 01 - home or self-care

## 2025-07-31 NOTE — PAYOR COMM NOTE
"Stefanie Clayton (80 y.o. Female)     Bryanna Rodrigues, RN  Utilization Review  Fxqyd-[phone]  [phone]        Date of Birth   1945    Social Security Number       Address   [address] [place] [address]    Home Phone   [phone]    MRN   [id number]       [facility]    Marital Status                               Admission Date   2025    Admission Type   Emergency    Admitting Provider   Dasha Spangler DO    Attending Provider   Dasha Spangler DO    Department, Room/Bed   [facility] 6B, N640/1       Discharge Date       Discharge Disposition   Home or Self Care    Discharge Destination                                 Attending Provider: Dasha Spangler DO    Allergies: No Known Allergies    Isolation: None   Infection: None   Code Status: CPR    Ht: 144.8 cm (57.01\")   Wt: 72.1 kg (159 lb)    Admission Cmt: None   Principal Problem: Acute exacerbation of chronic obstructive pulmonary disease (COPD) [J44.1]                   Active Insurance as of 2025       Primary Coverage       Payor Plan Insurance Group Employer/Plan Group    [facility]        Payor Plan Address Payor Plan Phone Number Payor Plan Fax Number Effective Dates    [address] [address]   2025 - None Entered    [facility] [address]         Subscriber Name Subscriber Birth Date Member ID       STEFANIE CLAYTON 1945 [id number]                     Emergency Contacts        (Rel.) Home Phone Work Phone Mobile Phone    Inez Alvarez (Daughter) [phone] -- [phone]    Sister Clayton [phone] -- --                 Discharge Summary        Dasha Spangler DO at [facility]              [facility] Medicine Services  DISCHARGE SUMMARY    Patient Name: Stefanie Clayton  : 1945  MRN: [id number]    Date of Admission: 2025  4:13 AM  Date of Discharge:  " 7/31/2025  Primary Care Physician: Eric Brunson DO    Consults       No orders found from 6/28/2025 to 7/28/2025.            Hospital Course     Presenting Problem: AECOPD w/hypoxia    Active Hospital Problems    Diagnosis  POA    **Acute exacerbation of chronic obstructive pulmonary disease (COPD) [J44.1]  Yes    Acute exacerbation of chronic obstructive airways disease [J44.1]  Yes    Hypertension [I10]  Yes    Hyperlipidemia [E78.5]  Yes    Dysphagia [R13.10]  Yes    Frequent falls [R29.6]  Not Applicable      Resolved Hospital Problems   No resolved problems to display.          Hospital Course:  Stefanie Russell is a 80 y.o. female with PMHx significant for HTN, HLD, COPD, anxiety/depression, GERD, CKDII, osteoporosis who presents with increasing SOA.     Acute Respiratory Failure w/Hypoxia - resolved  AECOPD  Bilateral Lower Lobe Atelectasis vs. PNA  - CXR without acute findings, CT chest showing near complete right and left lower lobar consolidation and atelectasis with volume loss, unable to exclude pneumonia  - weaned down to room air - 2L, CM to arrange home O2  - continue incentive spirometry and OPEP at discharge  - empiric doxy x 5 days, mucinex. CTX transition to PO Cefinir given imaging findings concerning for possible pneumonia in setting of worsening leukocytosis (now improved)  - prednisone taper at discharge  - Echo showing normal EF 66-70% and in setting of normal BNP doubt significant volume overload contributing, diuretics x 2 doses given     HTN  - continue atenolol, lisinopril     HLD:  - continue statin     CKDII:  - creatinine at baseline     Anxiety/Depression:  - continue home regimen    Discharge Follow Up Recommendations for outpatient labs/diagnostics:   - f/u with PCP in one week    Day of Discharge     HPI:   Patient sitting up in bedside chair. Feels much improved. Denies shortness of breath, wheezing or chest pain. Says she feels ready for discharge home.    Review of  Systems  Gen- No fevers, chills  CV- No chest pain, palpitations  Resp- No cough, dyspnea  GI- No N/V/D, abd pain      Vital Signs:   Temp:  [97.6 °F (36.4 °C)-98.2 °F (36.8 °C)] 97.9 °F (36.6 °C)  Heart Rate:  [66-79] 79  Resp:  [16-18] 18  BP: (120-167)/(67-87) 167/87  Flow (L/min) (Oxygen Therapy):  [2] 2      Physical Exam:  Constitutional: No acute distress, awake, alert  HENT: NCAT, mucous membranes moist  Respiratory: Clear to auscultation bilaterally, respiratory effort normal on 2L  Cardiovascular: RRR, no murmurs, rubs, or gallops  Gastrointestinal: soft, nontender, nondistended  Musculoskeletal: No bilateral ankle edema  Psychiatric: Appropriate affect, cooperative  Neurologic: Oriented x 3, speech clear, no focal deficits  Skin: No rashes      Pertinent  and/or Most Recent Results     LAB RESULTS:      Lab 07/30/25 0444 07/28/25 0414 07/27/25 0545 07/27/25 0433   WBC 9.91 15.03*  --  11.06*   HEMOGLOBIN 12.6 14.3  --  15.9   HEMATOCRIT 39.7 42.8  --  49.2*   PLATELETS 199 251  --  301   NEUTROS ABS 6.66 12.62*  --  6.18   IMMATURE GRANS (ABS) 0.04 0.10*  --  0.05   LYMPHS ABS 1.71 0.97  --  3.02   MONOS ABS 1.40* 1.30*  --  1.30*   EOS ABS 0.08 0.01  --  0.39   MCV 95.4 91.8  --  92.8   PROCALCITONIN  --   --  0.06  --          Lab 07/30/25 0444 07/28/25 0414 07/27/25 0545 07/27/25 0433   SODIUM 137 137  --  140   POTASSIUM 3.9 4.3  --  3.9   CHLORIDE 104 102  --  106   CO2 21.5* 20.1*  --  21.8*   ANION GAP 11.5 14.9  --  12.2   BUN 29.3* 26.8*  --  23.0   CREATININE 0.94 1.09*  --  1.31*   EGFR 62.2 52.1*  --  41.8*   GLUCOSE 88 162*  --  116*   CALCIUM 8.0* 8.3*  --  8.9   MAGNESIUM  --  2.2  --   --    PHOSPHORUS  --  3.6  --   --    TSH  --   --  1.050  --          Lab 07/27/25  0433   TOTAL PROTEIN 7.2   ALBUMIN 3.9   GLOBULIN 3.3   ALT (SGPT) 6   AST (SGOT) 17   BILIRUBIN 0.5   ALK PHOS 80         Lab 07/27/25  0545 07/27/25  0433   PROBNP  --  432.0   HSTROP T 11 14*                 Lab  07/27/25  0449   PH, ARTERIAL 7.440   PCO2, ARTERIAL 33.2*   PO2 ART 70.6*   FIO2 21   HCO3 ART 22.5   BASE EXCESS ART -0.8*   CARBOXYHEMOGLOBIN 1.3     Brief Urine Lab Results  (Last result in the past 365 days)        Color   Clarity   Blood   Leuk Est   Nitrite   Protein   CREAT   Urine HCG        07/27/25 1614 Yellow   Clear   Negative   Negative   Negative   Negative                 Microbiology Results (last 10 days)       ** No results found for the last 240 hours. **            CT Chest Without Contrast Diagnostic  Result Date: 7/29/2025  CT CHEST WO CONTRAST DIAGNOSTIC Date of Exam: 7/29/2025 8:29 AM EDT Indication: worsening hypoxia, elevated WBC. Comparison: 7/27/2025 Technique: Axial CT images were obtained of the chest without contrast administration.  Reconstructed coronal and sagittal images were also obtained. Automated exposure control and iterative construction methods were used. Findings: There is near complete right and left lower lobar consolidation and atelectasis with volume loss. The upper lobes are relatively free of disease, as is the right middle lobe. The thyroid is normal. No adenopathy is identified. Mild dilation of the ascending aorta measuring 4.2 cm. Moderate coronary artery calcifications. Esophagus is unremarkable. There are small bilateral pleural effusions. No adenopathy is identified. Limited evaluation of the upper abdomen is unremarkable. No aggressive appearing lytic or sclerotic bone lesions are identified. Multiple kyphoplasty changes are identified.     Impression: 1.Near complete right and left lower lobar consolidation and atelectasis with volume loss. No definite evidence of pneumonia, however cannot exclude superimposed pneumonia. 2.Small bilateral pleural effusions. 3.Mild dilation of the ascending aorta measuring 4.2 cm. Electronically Signed: Miguel Lewis MD  7/29/2025 9:06 AM EDT  Workstation ID: [id number]    XR Chest 1 View  Result Date: 7/27/2025  XR CHEST 1 VW  Date of Exam: 7/27/2025 4:47 AM EDT Indication: SOA triage protocol Comparison: 3/25/2022. Findings: There are no airspace consolidations. Small bilateral pleural effusions are present.. No pneumothorax. The pulmonary vasculature appears within normal limits. The cardiac and mediastinal silhouette appear unremarkable. No acute osseous abnormality identified.     Impression: Small bilateral pleural effusions. Electronically Signed: Tracey Reddy MD  7/27/2025 5:50 AM EDT  Workstation ID: [id number]              Results for orders placed during the hospital encounter of 07/27/25    Adult Transthoracic Echo Complete W/ Cont if Necessary Per Protocol 07/27/2025  1:42 PM    Interpretation Summary    Left ventricular systolic function is normal. Left ventricular ejection fraction appears to be 66 - 70%.    Left ventricular wall thickness is consistent with hypertrophy. Sigmoid-shaped ventricular septum is present.    Left ventricular diastolic function is consistent with (grade I) impaired relaxation.    Trace aortic regurgitation.    Trace mitral regurgitation.    Left ventricular intracavitary gradient is noted noted any evidence of outflow tract gradient.      Plan for Follow-up of Pending Labs/Results:     Discharge Details        Discharge Medications        New Medications        Instructions Start Date   albuterol sulfate  (90 Base) MCG/ACT inhaler  Commonly known as: PROVENTIL HFA;VENTOLIN HFA;PROAIR HFA   2 puffs, Inhalation, Every 4 Hours PRN      cefdinir 300 MG capsule  Commonly known as: OMNICEF   300 mg, Oral, 2 Times Daily      doxycycline 100 MG capsule  Commonly known as: MONODOX   100 mg, Oral, Every 12 Hours Scheduled      guaiFENesin 600 MG 12 hr tablet  Commonly known as: Mucinex   1,200 mg, Oral, 2 Times Daily      predniSONE 20 MG tablet  Commonly known as: DELTASONE   Take 2 tablets by mouth Daily With Breakfast for 1 day, THEN 1 tablet Daily With Breakfast for 2 days, THEN 0.5 tablets Daily  With Breakfast for 2 days.   Start Date: August 1, 2025            Continue These Medications        Instructions Start Date   alendronate 70 MG tablet  Commonly known as: FOSAMAX   No dose, route, or frequency recorded.      amLODIPine 5 MG tablet  Commonly known as: NORVASC   5 mg, Oral, Daily      atenolol 25 MG tablet  Commonly known as: TENORMIN   25 mg, Daily      busPIRone 10 MG tablet  Commonly known as: BUSPAR   No dose, route, or frequency recorded.      Calcium 600+D 600-800 MG-UNIT tablet  Generic drug: calcium carb-cholecalciferol   1 tablet, 2 Times Daily With Meals      citalopram 20 MG tablet  Commonly known as: CeleXA   No dose, route, or frequency recorded.      DULoxetine 30 MG capsule  Commonly known as: CYMBALTA   No dose, route, or frequency recorded.      fluticasone 50 MCG/ACT nasal spray  Commonly known as: FLONASE   2 sprays, Nasal, Daily      lisinopril 40 MG tablet  Commonly known as: PRINIVIL,ZESTRIL   40 mg, Oral, Daily      lovastatin 20 MG tablet  Commonly known as: MEVACOR   20 mg, Nightly      QUEtiapine 25 MG tablet  Commonly known as: SEROquel   25 mg, Oral, Nightly PRN      silver sulfadiazine 1 % cream  Commonly known as: SILVADENE, SSD   1 application , Topical, Daily      venlafaxine  MG 24 hr capsule  Commonly known as: EFFEXOR-XR   150 mg, Daily             Stop These Medications      cephalexin 500 MG capsule  Commonly known as: KEFLEX     diclofenac 50 MG EC tablet  Commonly known as: VOLTAREN              No Known Allergies      Discharge Disposition:  Home or Self Care    Diet:  Hospital:  Diet Order   Procedures    Diet: Regular/House; Fluid Consistency: Thin (IDDSI 0)            Activity:  - as tolerated    Restrictions or Other Recommendations:  none       CODE STATUS:    Code Status and Medical Interventions: CPR (Attempt to Resuscitate); Full Support   Ordered at: 07/27/25 0649     Code Status (Patient has no pulse and is not breathing):    CPR (Attempt to  Resuscitate)     Medical Interventions (Patient has pulse or is breathing):    Full Support       No future appointments.              Dasha Spangler DO  07/31/25      Time Spent on Discharge:  I spent  40  minutes on this discharge activity which included: face-to-face encounter with the patient, reviewing the data in the system, coordination of the care with the nursing staff as well as consultants, documentation, and entering orders.            Electronically signed by Dasha Spangler DO at 07/31/25 1126

## 2025-07-31 NOTE — PLAN OF CARE
Problem: Adult Inpatient Plan of Care  Goal: Plan of Care Review  Outcome: Progressing  Goal: Patient-Specific Goal (Individualized)  Outcome: Progressing  Goal: Absence of Hospital-Acquired Illness or Injury  Outcome: Progressing  Intervention: Identify and Manage Fall Risk  Description: Perform standard risk assessment on admission using a validated tool or comprehensive approach appropriate to the patient; reassess fall risk frequently, with change in status or transfer to another level of care.Communicate risk to interprofessional healthcare team; ensure fall risk visible cue.Determine need for increased observation, equipment and environmental modification, as well as use of supportive, nonskid footwear.Adjust safety measures to individual needs and identified risk factors.Reinforce the importance of active participation with fall risk prevention, safety, and physical activity with the patient and family.Perform regular intentional rounding to assess need for position change, pain assessment and personal needs, including assistance with toileting.  Recent Flowsheet Documentation  Taken 7/31/2025 0600 by Burton Guerin RN  Safety Promotion/Fall Prevention: safety round/check completed  Taken 7/31/2025 0400 by Burton Guerin RN  Safety Promotion/Fall Prevention: safety round/check completed  Taken 7/31/2025 0200 by Burton Guerin RN  Safety Promotion/Fall Prevention: safety round/check completed  Taken 7/31/2025 0000 by Burton Guerin RN  Safety Promotion/Fall Prevention: safety round/check completed  Taken 7/30/2025 2200 by Burton Guerin RN  Safety Promotion/Fall Prevention: safety round/check completed  Taken 7/30/2025 2000 by Burton Guerin RN  Safety Promotion/Fall Prevention: safety round/check completed  Intervention: Prevent Skin Injury  Description: Perform a screening for skin injury risk, such as pressure or moisture-associated skin damage on admission and at regular intervals  throughout hospital stay.Keep all areas of skin (especially folds) clean and dry.Maintain adequate skin hydration.Relieve and redistribute pressure and protect bony prominences and skin at risk for injury; implement measures based on patient-specific risk factors.Match turning and repositioning schedule to clinical condition.Encourage weight shift frequently; assist with reposition if unable to complete independently.Float heels off bed; avoid pressure on the Achilles tendon.Keep skin free from extended contact with medical devices.Optimize nutrition and hydration.Encourage functional activity and mobility, as early as tolerated.Use aids (e.g., slide boards, mechanical lift) during transfer.  Recent Flowsheet Documentation  Taken 7/31/2025 0600 by Burton Gueirn RN  Body Position: position changed independently  Skin Protection: incontinence pads utilized  Taken 7/31/2025 0400 by Burton Guerin RN  Body Position: position changed independently  Skin Protection: incontinence pads utilized  Taken 7/31/2025 0200 by Burton Guerin RN  Body Position: position changed independently  Skin Protection: incontinence pads utilized  Taken 7/31/2025 0000 by Burton Guerin RN  Body Position: position changed independently  Skin Protection: incontinence pads utilized  Taken 7/30/2025 2200 by Burton Guerin RN  Body Position: position changed independently  Skin Protection: incontinence pads utilized  Taken 7/30/2025 2000 by Burton Guerin RN  Body Position: position changed independently  Skin Protection: incontinence pads utilized  Goal: Optimal Comfort and Wellbeing  Outcome: Progressing  Goal: Readiness for Transition of Care  Outcome: Progressing     Problem: Fall Injury Risk  Goal: Absence of Fall and Fall-Related Injury  Outcome: Progressing  Intervention: Identify and Manage Contributors  Description: Develop a fall prevention plan, considering patient-centered interventions and family/caregiver  involvement; identify and address patient's facilitators and barriers.Provide reorientation, appropriate sensory stimulation and routines with changes in mental status to decrease risk of fall.Promote use of personal vision and auditory aids.Assess assistance level required for safe and effective self-care; provide support as needed, such as toileting and mobilization. For age 65 and older, implement timed toileting with assistance.Encourage physical activity, such as performance of mobility and self-care at highest level of patient ability, multicomponent exercise program and provision of appropriate assistive devices.If fall occurs, assess the severity of injury; implement fall injury protocol. Determine the cause and revise fall injury prevention plan.Regularly review and advocate for medication adjustment to decrease fall risk; consider administration times, polypharmacy and age.Balance adequate pain management with potential for oversedation.  Recent Flowsheet Documentation  Taken 7/30/2025 2000 by Burton Guerin RN  Medication Review/Management: medications reviewed  Intervention: Promote Injury-Free Environment  Description: Provide a safe, barrier-free environment that encourages independent activity.Keep care area uncluttered and well-lighted.Determine need for increased observation or monitoring.Avoid use of devices that minimize mobility, such as restraints or indwelling urinary catheter.  Recent Flowsheet Documentation  Taken 7/31/2025 0600 by Burton Guerin, RN  Safety Promotion/Fall Prevention: safety round/check completed  Taken 7/31/2025 0400 by Burton Guerin RN  Safety Promotion/Fall Prevention: safety round/check completed  Taken 7/31/2025 0200 by Burton Guerin, RN  Safety Promotion/Fall Prevention: safety round/check completed  Taken 7/31/2025 0000 by Burton Guerin, RN  Safety Promotion/Fall Prevention: safety round/check completed  Taken 7/30/2025 2200 by Burton Guerin,  RN  Safety Promotion/Fall Prevention: safety round/check completed  Taken 7/30/2025 2000 by Burton Guerin, RN  Safety Promotion/Fall Prevention: safety round/check completed     Problem: Skin Injury Risk Increased  Goal: Skin Health and Integrity  Outcome: Progressing  Intervention: Optimize Skin Protection  Description: Perform a full pressure injury risk assessment, as indicated by screening, upon admission to care unit.Reassess skin (full inspection and injury risk, including skin temperature, consistency and color) frequently (e.g., scheduled interval, with change in condition) to provide optimal early detection and prevention.Maintain adequate tissue perfusion (e.g., encourage fluid balance; avoid crossing legs, constrictive clothing or devices) to promote tissue oxygenation.Maintain head of bed at lowest degree of elevation tolerated, considering medical condition and other restrictions. Use positioning supports to prevent sliding and friction. Consider low friction textiles.Avoid positioning onto an area that remains reddened or on bony prominences.Minimize incontinence and moisture (e.g., toileting schedule; moisture-wicking pad, diaper or incontinence collection device; skin moisture barrier).Cleanse skin promptly and gently, when soiled, utilizing a pH-balanced cleanser.Relieve and redistribute pressure (e.g., scheduled position changes, weight shifts, use of support surface, medical device repositioning, protective dressing application, use of positioning device, microclimate control, use of pressure-injury-monitorEncourage increased activity, such as sitting in a chair at the bedside or early mobilization, when able to tolerate. Avoid prolonged sitting.  Recent Flowsheet Documentation  Taken 7/31/2025 0600 by Burton Guerin, RN  Activity Management: activity minimized  Pressure Reduction Techniques: frequent weight shift encouraged  Head of Bed (HOB) Positioning: HOB elevated  Pressure Reduction  Devices: positioning supports utilized  Skin Protection: incontinence pads utilized  Taken 7/31/2025 0400 by Burton Guerin RN  Activity Management: activity minimized  Pressure Reduction Techniques: frequent weight shift encouraged  Head of Bed (HOB) Positioning: HOB elevated  Pressure Reduction Devices: positioning supports utilized  Skin Protection: incontinence pads utilized  Taken 7/31/2025 0200 by Burton Guerin RN  Activity Management: activity minimized  Pressure Reduction Techniques: frequent weight shift encouraged  Head of Bed (HOB) Positioning: HOB elevated  Pressure Reduction Devices: positioning supports utilized  Skin Protection: incontinence pads utilized  Taken 7/31/2025 0000 by Burton Guerin RN  Activity Management: activity minimized  Pressure Reduction Techniques: frequent weight shift encouraged  Head of Bed (HOB) Positioning: HOB elevated  Pressure Reduction Devices: positioning supports utilized  Skin Protection: incontinence pads utilized  Taken 7/30/2025 2200 by Burton Guerin RN  Activity Management: activity minimized  Pressure Reduction Techniques: frequent weight shift encouraged  Head of Bed (HOB) Positioning: HOB elevated  Pressure Reduction Devices: positioning supports utilized  Skin Protection: incontinence pads utilized  Taken 7/30/2025 2000 by Burton Guerin RN  Activity Management:   activity encouraged   back to bed  Pressure Reduction Techniques: frequent weight shift encouraged  Head of Bed (HOB) Positioning: HOB elevated  Pressure Reduction Devices: positioning supports utilized  Skin Protection: incontinence pads utilized     Problem: Comorbidity Management  Goal: Maintenance of COPD Symptom Control  Outcome: Progressing  Intervention: Maintain COPD (Chronic Obstructive Pulmonary Disease) Symptom Control  Description: Evaluate adherence to self-management (COPD action plan), such as medication, symptom control, trigger avoidance, infection prevention and  self-monitoring.Advocate for continuation of home regimen, including oxygen, medication and noninvasive positive pressure use.Anticipate the need for breathing techniques and activity pacing to minimize fatigue and breathlessness.Assess for proper use of inhaled medication and delivery technique; assist or reinstruct if needed.Evaluate effectiveness of coping skills; encourage expression of feelings, expectations and concerns related to disease management and quality of life; reinforce education to enhance management plan and wellbeing.  Recent Flowsheet Documentation  Taken 7/30/2025 2000 by Burton Guerin RN  Medication Review/Management: medications reviewed  Goal: Blood Pressure in Desired Range  Outcome: Progressing  Intervention: Maintain Blood Pressure Management  Description: Evaluate adherence to home antihypertensive regimen (e.g., exercise and activity, diet modification, medication).Provide scheduled antihypertensive medication; consider administration time and effects (e.g., avoid giving diuretic prior to bedtime).Monitor response to antihypertensive medication therapy (e.g., blood pressure, electrolyte levels, medication effects).Minimize risk of orthostatic hypotension; encourage caution with position changes, particularly if elderly.  Recent Flowsheet Documentation  Taken 7/30/2025 2000 by Burton Guerin, RN  Medication Review/Management: medications reviewed   Goal Outcome Evaluation:

## 2025-07-31 NOTE — DISCHARGE SUMMARY
[facility] Medicine Services  DISCHARGE SUMMARY    Patient Name: Stefanie Russell  : 1945  MRN: [id number]    Date of Admission: 2025  4:13 AM  Date of Discharge:  2025  Primary Care Physician: Eric Brunson,     Consults       No orders found from 2025 to 2025.            Hospital Course     Presenting Problem: AECOPD w/hypoxia    Active Hospital Problems    Diagnosis  POA    **Acute exacerbation of chronic obstructive pulmonary disease (COPD) [J44.1]  Yes    Acute exacerbation of chronic obstructive airways disease [J44.1]  Yes    Hypertension [I10]  Yes    Hyperlipidemia [E78.5]  Yes    Dysphagia [R13.10]  Yes    Frequent falls [R29.6]  Not Applicable      Resolved Hospital Problems   No resolved problems to display.          Hospital Course:  Stefanie uRssell is a 80 y.o. female with PMHx significant for HTN, HLD, COPD, anxiety/depression, GERD, CKDII, osteoporosis who presents with increasing SOA.     Acute Respiratory Failure w/Hypoxia - resolved  AECOPD  Bilateral Lower Lobe Atelectasis vs. PNA  - CXR without acute findings, CT chest showing near complete right and left lower lobar consolidation and atelectasis with volume loss, unable to exclude pneumonia  - weaned down to room air - 2L, CM to arrange home O2  - continue incentive spirometry and OPEP at discharge  - empiric doxy x 5 days, mucinex. CTX transition to PO Cefinir given imaging findings concerning for possible pneumonia in setting of worsening leukocytosis (now improved)  - prednisone taper at discharge  - Echo showing normal EF 66-70% and in setting of normal BNP doubt significant volume overload contributing, diuretics x 2 doses given     HTN  - continue atenolol, lisinopril     HLD:  - continue statin     CKDII:  - creatinine at baseline     Anxiety/Depression:  - continue home regimen    Discharge Follow Up Recommendations for outpatient labs/diagnostics:   - f/u with PCP in one  Confused on admission    --Avasys  --Fall precautions  --Neuro checks Q4H x24 hours    9/11/22: Pt AAOx3 at times but with notable cognitive deficits.     week    Day of Discharge     HPI:   Patient sitting up in bedside chair. Feels much improved. Denies shortness of breath, wheezing or chest pain. Says she feels ready for discharge home.    Review of Systems  Gen- No fevers, chills  CV- No chest pain, palpitations  Resp- No cough, dyspnea  GI- No N/V/D, abd pain      Vital Signs:   Temp:  [97.6 °F (36.4 °C)-98.2 °F (36.8 °C)] 97.9 °F (36.6 °C)  Heart Rate:  [66-79] 79  Resp:  [16-18] 18  BP: (120-167)/(67-87) 167/87  Flow (L/min) (Oxygen Therapy):  [2] 2      Physical Exam:  Constitutional: No acute distress, awake, alert  HENT: NCAT, mucous membranes moist  Respiratory: Clear to auscultation bilaterally, respiratory effort normal on 2L  Cardiovascular: RRR, no murmurs, rubs, or gallops  Gastrointestinal: soft, nontender, nondistended  Musculoskeletal: No bilateral ankle edema  Psychiatric: Appropriate affect, cooperative  Neurologic: Oriented x 3, speech clear, no focal deficits  Skin: No rashes      Pertinent  and/or Most Recent Results     LAB RESULTS:      Lab 07/30/25 0444 07/28/25 0414 07/27/25 0545 07/27/25 0433   WBC 9.91 15.03*  --  11.06*   HEMOGLOBIN 12.6 14.3  --  15.9   HEMATOCRIT 39.7 42.8  --  49.2*   PLATELETS 199 251  --  301   NEUTROS ABS 6.66 12.62*  --  6.18   IMMATURE GRANS (ABS) 0.04 0.10*  --  0.05   LYMPHS ABS 1.71 0.97  --  3.02   MONOS ABS 1.40* 1.30*  --  1.30*   EOS ABS 0.08 0.01  --  0.39   MCV 95.4 91.8  --  92.8   PROCALCITONIN  --   --  0.06  --          Lab 07/30/25 0444 07/28/25 0414 07/27/25 0545 07/27/25  0433   SODIUM 137 137  --  140   POTASSIUM 3.9 4.3  --  3.9   CHLORIDE 104 102  --  106   CO2 21.5* 20.1*  --  21.8*   ANION GAP 11.5 14.9  --  12.2   BUN 29.3* 26.8*  --  23.0   CREATININE 0.94 1.09*  --  1.31*   EGFR 62.2 52.1*  --  41.8*   GLUCOSE 88 162*  --  116*   CALCIUM 8.0* 8.3*  --  8.9   MAGNESIUM  --  2.2  --   --    PHOSPHORUS  --  3.6  --   --    TSH  --   --  1.050  --          Lab 07/27/25  1412   TOTAL  PROTEIN 7.2   ALBUMIN 3.9   GLOBULIN 3.3   ALT (SGPT) 6   AST (SGOT) 17   BILIRUBIN 0.5   ALK PHOS 80         Lab 07/27/25  0545 07/27/25  0433   PROBNP  --  432.0   HSTROP T 11 14*                 Lab 07/27/25  0449   PH, ARTERIAL 7.440   PCO2, ARTERIAL 33.2*   PO2 ART 70.6*   FIO2 21   HCO3 ART 22.5   BASE EXCESS ART -0.8*   CARBOXYHEMOGLOBIN 1.3     Brief Urine Lab Results  (Last result in the past 365 days)        Color   Clarity   Blood   Leuk Est   Nitrite   Protein   CREAT   Urine HCG        07/27/25 1614 Yellow   Clear   Negative   Negative   Negative   Negative                 Microbiology Results (last 10 days)       ** No results found for the last 240 hours. **            CT Chest Without Contrast Diagnostic  Result Date: 7/29/2025  CT CHEST WO CONTRAST DIAGNOSTIC Date of Exam: 7/29/2025 8:29 AM EDT Indication: worsening hypoxia, elevated WBC. Comparison: 7/27/2025 Technique: Axial CT images were obtained of the chest without contrast administration.  Reconstructed coronal and sagittal images were also obtained. Automated exposure control and iterative construction methods were used. Findings: There is near complete right and left lower lobar consolidation and atelectasis with volume loss. The upper lobes are relatively free of disease, as is the right middle lobe. The thyroid is normal. No adenopathy is identified. Mild dilation of the ascending aorta measuring 4.2 cm. Moderate coronary artery calcifications. Esophagus is unremarkable. There are small bilateral pleural effusions. No adenopathy is identified. Limited evaluation of the upper abdomen is unremarkable. No aggressive appearing lytic or sclerotic bone lesions are identified. Multiple kyphoplasty changes are identified.     Impression: 1.Near complete right and left lower lobar consolidation and atelectasis with volume loss. No definite evidence of pneumonia, however cannot exclude superimposed pneumonia. 2.Small bilateral pleural effusions.  3.Mild dilation of the ascending aorta measuring 4.2 cm. Electronically Signed: Miguel Lewis MD  7/29/2025 9:06 AM EDT  Workstation ID: [id number]    XR Chest 1 View  Result Date: 7/27/2025  XR CHEST 1 VW Date of Exam: 7/27/2025 4:47 AM EDT Indication: SOA triage protocol Comparison: 3/25/2022. Findings: There are no airspace consolidations. Small bilateral pleural effusions are present.. No pneumothorax. The pulmonary vasculature appears within normal limits. The cardiac and mediastinal silhouette appear unremarkable. No acute osseous abnormality identified.     Impression: Small bilateral pleural effusions. Electronically Signed: Tracey Reddy MD  7/27/2025 5:50 AM EDT  Workstation ID: [id number]              Results for orders placed during the hospital encounter of 07/27/25    Adult Transthoracic Echo Complete W/ Cont if Necessary Per Protocol 07/27/2025  1:42 PM    Interpretation Summary    Left ventricular systolic function is normal. Left ventricular ejection fraction appears to be 66 - 70%.    Left ventricular wall thickness is consistent with hypertrophy. Sigmoid-shaped ventricular septum is present.    Left ventricular diastolic function is consistent with (grade I) impaired relaxation.    Trace aortic regurgitation.    Trace mitral regurgitation.    Left ventricular intracavitary gradient is noted noted any evidence of outflow tract gradient.      Plan for Follow-up of Pending Labs/Results:     Discharge Details        Discharge Medications        New Medications        Instructions Start Date   albuterol sulfate  (90 Base) MCG/ACT inhaler  Commonly known as: PROVENTIL HFA;VENTOLIN HFA;PROAIR HFA   2 puffs, Inhalation, Every 4 Hours PRN      cefdinir 300 MG capsule  Commonly known as: OMNICEF   300 mg, Oral, 2 Times Daily      doxycycline 100 MG capsule  Commonly known as: MONODOX   100 mg, Oral, Every 12 Hours Scheduled      guaiFENesin 600 MG 12 hr tablet  Commonly known as: Mucinex   1,200 mg, Oral,  2 Times Daily      predniSONE 20 MG tablet  Commonly known as: DELTASONE   Take 2 tablets by mouth Daily With Breakfast for 1 day, THEN 1 tablet Daily With Breakfast for 2 days, THEN 0.5 tablets Daily With Breakfast for 2 days.   Start Date: August 1, 2025            Continue These Medications        Instructions Start Date   alendronate 70 MG tablet  Commonly known as: FOSAMAX   No dose, route, or frequency recorded.      amLODIPine 5 MG tablet  Commonly known as: NORVASC   5 mg, Oral, Daily      atenolol 25 MG tablet  Commonly known as: TENORMIN   25 mg, Daily      busPIRone 10 MG tablet  Commonly known as: BUSPAR   No dose, route, or frequency recorded.      Calcium 600+D 600-800 MG-UNIT tablet  Generic drug: calcium carb-cholecalciferol   1 tablet, 2 Times Daily With Meals      citalopram 20 MG tablet  Commonly known as: CeleXA   No dose, route, or frequency recorded.      DULoxetine 30 MG capsule  Commonly known as: CYMBALTA   No dose, route, or frequency recorded.      fluticasone 50 MCG/ACT nasal spray  Commonly known as: FLONASE   2 sprays, Nasal, Daily      lisinopril 40 MG tablet  Commonly known as: PRINIVIL,ZESTRIL   40 mg, Oral, Daily      lovastatin 20 MG tablet  Commonly known as: MEVACOR   20 mg, Nightly      QUEtiapine 25 MG tablet  Commonly known as: SEROquel   25 mg, Oral, Nightly PRN      silver sulfadiazine 1 % cream  Commonly known as: SILVADENE, SSD   1 application , Topical, Daily      venlafaxine  MG 24 hr capsule  Commonly known as: EFFEXOR-XR   150 mg, Daily             Stop These Medications      cephalexin 500 MG capsule  Commonly known as: KEFLEX     diclofenac 50 MG EC tablet  Commonly known as: VOLTAREN              No Known Allergies      Discharge Disposition:  Home or Self Care    Diet:  Hospital:  Diet Order   Procedures    Diet: Regular/House; Fluid Consistency: Thin (IDDSI 0)            Activity:  - as tolerated    Restrictions or Other Recommendations:  none       CODE  STATUS:    Code Status and Medical Interventions: CPR (Attempt to Resuscitate); Full Support   Ordered at: 07/27/25 0649     Code Status (Patient has no pulse and is not breathing):    CPR (Attempt to Resuscitate)     Medical Interventions (Patient has pulse or is breathing):    Full Support       No future appointments.              Dasha Spangler,   07/31/25      Time Spent on Discharge:  I spent  40  minutes on this discharge activity which included: face-to-face encounter with the patient, reviewing the data in the system, coordination of the care with the nursing staff as well as consultants, documentation, and entering orders.

## 2025-08-11 ENCOUNTER — READMISSION MANAGEMENT (OUTPATIENT)
Dept: CALL CENTER | Facility: HOSPITAL | Age: 80
End: 2025-08-11
Payer: MEDICARE